# Patient Record
Sex: FEMALE | Race: OTHER | HISPANIC OR LATINO | URBAN - METROPOLITAN AREA
[De-identification: names, ages, dates, MRNs, and addresses within clinical notes are randomized per-mention and may not be internally consistent; named-entity substitution may affect disease eponyms.]

---

## 2020-01-01 ENCOUNTER — INPATIENT (INPATIENT)
Facility: HOSPITAL | Age: 45
LOS: 23 days | DRG: 207 | End: 2020-05-01
Attending: INTERNAL MEDICINE | Admitting: STUDENT IN AN ORGANIZED HEALTH CARE EDUCATION/TRAINING PROGRAM
Payer: COMMERCIAL

## 2020-01-01 VITALS
TEMPERATURE: 102 F | OXYGEN SATURATION: 84 % | SYSTOLIC BLOOD PRESSURE: 137 MMHG | HEART RATE: 121 BPM | WEIGHT: 187.39 LBS | RESPIRATION RATE: 24 BRPM | HEIGHT: 61.42 IN | DIASTOLIC BLOOD PRESSURE: 83 MMHG

## 2020-01-01 VITALS — HEART RATE: 116 BPM | OXYGEN SATURATION: 80 %

## 2020-01-01 DIAGNOSIS — R09.02 HYPOXEMIA: ICD-10-CM

## 2020-01-01 DIAGNOSIS — J18.9 PNEUMONIA, UNSPECIFIED ORGANISM: ICD-10-CM

## 2020-01-01 DIAGNOSIS — F32.9 MAJOR DEPRESSIVE DISORDER, SINGLE EPISODE, UNSPECIFIED: ICD-10-CM

## 2020-01-01 DIAGNOSIS — J96.01 ACUTE RESPIRATORY FAILURE WITH HYPOXIA: ICD-10-CM

## 2020-01-01 DIAGNOSIS — E03.9 HYPOTHYROIDISM, UNSPECIFIED: ICD-10-CM

## 2020-01-01 DIAGNOSIS — Z29.9 ENCOUNTER FOR PROPHYLACTIC MEASURES, UNSPECIFIED: ICD-10-CM

## 2020-01-01 LAB
-  AMIKACIN: SIGNIFICANT CHANGE UP
-  AMIKACIN: SIGNIFICANT CHANGE UP
-  AMPICILLIN/SULBACTAM: SIGNIFICANT CHANGE UP
-  AMPICILLIN/SULBACTAM: SIGNIFICANT CHANGE UP
-  AMPICILLIN: SIGNIFICANT CHANGE UP
-  AMPICILLIN: SIGNIFICANT CHANGE UP
-  AZTREONAM: SIGNIFICANT CHANGE UP
-  AZTREONAM: SIGNIFICANT CHANGE UP
-  CEFAZOLIN: SIGNIFICANT CHANGE UP
-  CEFAZOLIN: SIGNIFICANT CHANGE UP
-  CEFEPIME: SIGNIFICANT CHANGE UP
-  CEFEPIME: SIGNIFICANT CHANGE UP
-  CEFOXITIN: SIGNIFICANT CHANGE UP
-  CEFOXITIN: SIGNIFICANT CHANGE UP
-  CEFTRIAXONE: SIGNIFICANT CHANGE UP
-  CEFTRIAXONE: SIGNIFICANT CHANGE UP
-  CIPROFLOXACIN: SIGNIFICANT CHANGE UP
-  CIPROFLOXACIN: SIGNIFICANT CHANGE UP
-  ERTAPENEM: SIGNIFICANT CHANGE UP
-  ERTAPENEM: SIGNIFICANT CHANGE UP
-  GENTAMICIN: SIGNIFICANT CHANGE UP
-  GENTAMICIN: SIGNIFICANT CHANGE UP
-  IMIPENEM: SIGNIFICANT CHANGE UP
-  IMIPENEM: SIGNIFICANT CHANGE UP
-  LEVOFLOXACIN: SIGNIFICANT CHANGE UP
-  LEVOFLOXACIN: SIGNIFICANT CHANGE UP
-  MEROPENEM: SIGNIFICANT CHANGE UP
-  MEROPENEM: SIGNIFICANT CHANGE UP
-  PIPERACILLIN/TAZOBACTAM: SIGNIFICANT CHANGE UP
-  PIPERACILLIN/TAZOBACTAM: SIGNIFICANT CHANGE UP
-  TOBRAMYCIN: SIGNIFICANT CHANGE UP
-  TOBRAMYCIN: SIGNIFICANT CHANGE UP
-  TRIMETHOPRIM/SULFAMETHOXAZOLE: SIGNIFICANT CHANGE UP
-  TRIMETHOPRIM/SULFAMETHOXAZOLE: SIGNIFICANT CHANGE UP
ABO RH CONFIRMATION: SIGNIFICANT CHANGE UP
ALBUMIN SERPL ELPH-MCNC: 2.9 G/DL — LOW (ref 3.3–5.2)
ALBUMIN SERPL ELPH-MCNC: 3 G/DL — LOW (ref 3.3–5.2)
ALBUMIN SERPL ELPH-MCNC: 3.1 G/DL — LOW (ref 3.3–5.2)
ALBUMIN SERPL ELPH-MCNC: 3.2 G/DL — LOW (ref 3.3–5.2)
ALBUMIN SERPL ELPH-MCNC: 3.2 G/DL — LOW (ref 3.3–5.2)
ALBUMIN SERPL ELPH-MCNC: 3.5 G/DL — SIGNIFICANT CHANGE UP (ref 3.3–5.2)
ALBUMIN SERPL ELPH-MCNC: 3.5 G/DL — SIGNIFICANT CHANGE UP (ref 3.3–5.2)
ALBUMIN SERPL ELPH-MCNC: 3.6 G/DL — SIGNIFICANT CHANGE UP (ref 3.3–5.2)
ALBUMIN SERPL ELPH-MCNC: 4.1 G/DL — SIGNIFICANT CHANGE UP (ref 3.3–5.2)
ALP SERPL-CCNC: 105 U/L — SIGNIFICANT CHANGE UP (ref 40–120)
ALP SERPL-CCNC: 105 U/L — SIGNIFICANT CHANGE UP (ref 40–120)
ALP SERPL-CCNC: 112 U/L — SIGNIFICANT CHANGE UP (ref 40–120)
ALP SERPL-CCNC: 115 U/L — SIGNIFICANT CHANGE UP (ref 40–120)
ALP SERPL-CCNC: 120 U/L — SIGNIFICANT CHANGE UP (ref 40–120)
ALP SERPL-CCNC: 130 U/L — HIGH (ref 40–120)
ALP SERPL-CCNC: 140 U/L — HIGH (ref 40–120)
ALP SERPL-CCNC: 146 U/L — HIGH (ref 40–120)
ALP SERPL-CCNC: 146 U/L — HIGH (ref 40–120)
ALP SERPL-CCNC: 155 U/L — HIGH (ref 40–120)
ALP SERPL-CCNC: 182 U/L — HIGH (ref 40–120)
ALP SERPL-CCNC: 64 U/L — SIGNIFICANT CHANGE UP (ref 40–120)
ALP SERPL-CCNC: 94 U/L — SIGNIFICANT CHANGE UP (ref 40–120)
ALP SERPL-CCNC: 94 U/L — SIGNIFICANT CHANGE UP (ref 40–120)
ALP SERPL-CCNC: 98 U/L — SIGNIFICANT CHANGE UP (ref 40–120)
ALP SERPL-CCNC: 99 U/L — SIGNIFICANT CHANGE UP (ref 40–120)
ALP SERPL-CCNC: 99 U/L — SIGNIFICANT CHANGE UP (ref 40–120)
ALT FLD-CCNC: 119 U/L — HIGH
ALT FLD-CCNC: 164 U/L — HIGH
ALT FLD-CCNC: 169 U/L — HIGH
ALT FLD-CCNC: 174 U/L — HIGH
ALT FLD-CCNC: 199 U/L — HIGH
ALT FLD-CCNC: 226 U/L — HIGH
ALT FLD-CCNC: 31 U/L — SIGNIFICANT CHANGE UP
ALT FLD-CCNC: 336 U/L — HIGH
ALT FLD-CCNC: 34 U/L — HIGH
ALT FLD-CCNC: 37 U/L — HIGH
ALT FLD-CCNC: 39 U/L — HIGH
ALT FLD-CCNC: 54 U/L — HIGH
ALT FLD-CCNC: 62 U/L — HIGH
ALT FLD-CCNC: 63 U/L — HIGH
ALT FLD-CCNC: 73 U/L — HIGH
ALT FLD-CCNC: 77 U/L — HIGH
ALT FLD-CCNC: 85 U/L — HIGH
AMYLASE P1 CFR SERPL: 58 U/L — SIGNIFICANT CHANGE UP (ref 36–128)
ANION GAP SERPL CALC-SCNC: 10 MMOL/L — SIGNIFICANT CHANGE UP (ref 5–17)
ANION GAP SERPL CALC-SCNC: 10 MMOL/L — SIGNIFICANT CHANGE UP (ref 5–17)
ANION GAP SERPL CALC-SCNC: 11 MMOL/L — SIGNIFICANT CHANGE UP (ref 5–17)
ANION GAP SERPL CALC-SCNC: 12 MMOL/L — SIGNIFICANT CHANGE UP (ref 5–17)
ANION GAP SERPL CALC-SCNC: 13 MMOL/L — SIGNIFICANT CHANGE UP (ref 5–17)
ANION GAP SERPL CALC-SCNC: 14 MMOL/L — SIGNIFICANT CHANGE UP (ref 5–17)
ANION GAP SERPL CALC-SCNC: 15 MMOL/L — SIGNIFICANT CHANGE UP (ref 5–17)
ANION GAP SERPL CALC-SCNC: 15 MMOL/L — SIGNIFICANT CHANGE UP (ref 5–17)
ANION GAP SERPL CALC-SCNC: 16 MMOL/L — SIGNIFICANT CHANGE UP (ref 5–17)
ANION GAP SERPL CALC-SCNC: 16 MMOL/L — SIGNIFICANT CHANGE UP (ref 5–17)
ANION GAP SERPL CALC-SCNC: 17 MMOL/L — SIGNIFICANT CHANGE UP (ref 5–17)
ANION GAP SERPL CALC-SCNC: 7 MMOL/L — SIGNIFICANT CHANGE UP (ref 5–17)
ANION GAP SERPL CALC-SCNC: 7 MMOL/L — SIGNIFICANT CHANGE UP (ref 5–17)
ANION GAP SERPL CALC-SCNC: 9 MMOL/L — SIGNIFICANT CHANGE UP (ref 5–17)
ANISOCYTOSIS BLD QL: SLIGHT — SIGNIFICANT CHANGE UP
ANISOCYTOSIS BLD QL: SLIGHT — SIGNIFICANT CHANGE UP
APPEARANCE UR: CLEAR — SIGNIFICANT CHANGE UP
APTT BLD: 116.4 SEC — HIGH (ref 27.5–36.3)
APTT BLD: 137.3 SEC — CRITICAL HIGH (ref 27.5–36.3)
APTT BLD: 42.9 SEC — HIGH (ref 27.5–36.3)
APTT BLD: 62.4 SEC — HIGH (ref 27.5–36.3)
APTT BLD: 64.2 SEC — HIGH (ref 27.5–36.3)
APTT BLD: 74.8 SEC — HIGH (ref 27.5–36.3)
APTT BLD: 78.7 SEC — HIGH (ref 27.5–36.3)
APTT BLD: 88.9 SEC — HIGH (ref 27.5–36.3)
AST SERPL-CCNC: 118 U/L — HIGH
AST SERPL-CCNC: 158 U/L — HIGH
AST SERPL-CCNC: 216 U/L — HIGH
AST SERPL-CCNC: 32 U/L — HIGH
AST SERPL-CCNC: 34 U/L — HIGH
AST SERPL-CCNC: 34 U/L — HIGH
AST SERPL-CCNC: 36 U/L — HIGH
AST SERPL-CCNC: 42 U/L — HIGH
AST SERPL-CCNC: 44 U/L — HIGH
AST SERPL-CCNC: 53 U/L — HIGH
AST SERPL-CCNC: 61 U/L — HIGH
AST SERPL-CCNC: 66 U/L — HIGH
AST SERPL-CCNC: 70 U/L — HIGH
AST SERPL-CCNC: 71 U/L — HIGH
AST SERPL-CCNC: 83 U/L — HIGH
AST SERPL-CCNC: 89 U/L — HIGH
AST SERPL-CCNC: 96 U/L — HIGH
BACTERIA # UR AUTO: ABNORMAL
BASE EXCESS BLDA CALC-SCNC: 10.4 MMOL/L — HIGH (ref -2–2)
BASE EXCESS BLDA CALC-SCNC: 11.3 MMOL/L — HIGH (ref -2–2)
BASE EXCESS BLDA CALC-SCNC: 11.7 MMOL/L — HIGH (ref -2–2)
BASE EXCESS BLDA CALC-SCNC: 14 MMOL/L — HIGH (ref -2–2)
BASE EXCESS BLDA CALC-SCNC: 2 MMOL/L — SIGNIFICANT CHANGE UP (ref -3–3)
BASE EXCESS BLDA CALC-SCNC: 4.4 MMOL/L — HIGH (ref -2–2)
BASE EXCESS BLDA CALC-SCNC: 6.6 MMOL/L — HIGH (ref -2–2)
BASE EXCESS BLDA CALC-SCNC: 7.5 MMOL/L — HIGH (ref -2–2)
BASE EXCESS BLDA CALC-SCNC: 9.1 MMOL/L — HIGH (ref -2–2)
BASO STIPL BLD QL SMEAR: PRESENT — SIGNIFICANT CHANGE UP
BASOPHILS # BLD AUTO: 0 K/UL — SIGNIFICANT CHANGE UP (ref 0–0.2)
BASOPHILS # BLD AUTO: 0 K/UL — SIGNIFICANT CHANGE UP (ref 0–0.2)
BASOPHILS # BLD AUTO: 0.01 K/UL — SIGNIFICANT CHANGE UP (ref 0–0.2)
BASOPHILS # BLD AUTO: 0.02 K/UL — SIGNIFICANT CHANGE UP (ref 0–0.2)
BASOPHILS # BLD AUTO: 0.05 K/UL — SIGNIFICANT CHANGE UP (ref 0–0.2)
BASOPHILS # BLD AUTO: 0.05 K/UL — SIGNIFICANT CHANGE UP (ref 0–0.2)
BASOPHILS # BLD AUTO: 0.06 K/UL — SIGNIFICANT CHANGE UP (ref 0–0.2)
BASOPHILS # BLD AUTO: 0.07 K/UL — SIGNIFICANT CHANGE UP (ref 0–0.2)
BASOPHILS # BLD AUTO: 0.08 K/UL — SIGNIFICANT CHANGE UP (ref 0–0.2)
BASOPHILS # BLD AUTO: 0.09 K/UL — SIGNIFICANT CHANGE UP (ref 0–0.2)
BASOPHILS # BLD AUTO: 0.09 K/UL — SIGNIFICANT CHANGE UP (ref 0–0.2)
BASOPHILS # BLD AUTO: 0.13 K/UL — SIGNIFICANT CHANGE UP (ref 0–0.2)
BASOPHILS # BLD AUTO: 0.14 K/UL — SIGNIFICANT CHANGE UP (ref 0–0.2)
BASOPHILS NFR BLD AUTO: 0 % — SIGNIFICANT CHANGE UP (ref 0–2)
BASOPHILS NFR BLD AUTO: 0 % — SIGNIFICANT CHANGE UP (ref 0–2)
BASOPHILS NFR BLD AUTO: 0.2 % — SIGNIFICANT CHANGE UP (ref 0–2)
BASOPHILS NFR BLD AUTO: 0.3 % — SIGNIFICANT CHANGE UP (ref 0–2)
BASOPHILS NFR BLD AUTO: 0.4 % — SIGNIFICANT CHANGE UP (ref 0–2)
BASOPHILS NFR BLD AUTO: 0.6 % — SIGNIFICANT CHANGE UP (ref 0–2)
BASOPHILS NFR BLD AUTO: 0.6 % — SIGNIFICANT CHANGE UP (ref 0–2)
BASOPHILS NFR BLD AUTO: 0.7 % — SIGNIFICANT CHANGE UP (ref 0–2)
BILIRUB SERPL-MCNC: 0.3 MG/DL — LOW (ref 0.4–2)
BILIRUB SERPL-MCNC: 0.4 MG/DL — SIGNIFICANT CHANGE UP (ref 0.4–2)
BILIRUB SERPL-MCNC: 0.5 MG/DL — SIGNIFICANT CHANGE UP (ref 0.4–2)
BILIRUB SERPL-MCNC: 0.6 MG/DL — SIGNIFICANT CHANGE UP (ref 0.4–2)
BILIRUB SERPL-MCNC: 0.7 MG/DL — SIGNIFICANT CHANGE UP (ref 0.4–2)
BILIRUB SERPL-MCNC: 1 MG/DL — SIGNIFICANT CHANGE UP (ref 0.4–2)
BILIRUB SERPL-MCNC: 1 MG/DL — SIGNIFICANT CHANGE UP (ref 0.4–2)
BILIRUB SERPL-MCNC: 1.9 MG/DL — SIGNIFICANT CHANGE UP (ref 0.4–2)
BILIRUB SERPL-MCNC: 2.8 MG/DL — HIGH (ref 0.4–2)
BILIRUB UR-MCNC: ABNORMAL
BLD GP AB SCN SERPL QL: SIGNIFICANT CHANGE UP
BLOOD GAS COMMENTS ARTERIAL: SIGNIFICANT CHANGE UP
BUN SERPL-MCNC: 13 MG/DL — SIGNIFICANT CHANGE UP (ref 8–20)
BUN SERPL-MCNC: 15 MG/DL — SIGNIFICANT CHANGE UP (ref 8–20)
BUN SERPL-MCNC: 19 MG/DL — SIGNIFICANT CHANGE UP (ref 8–20)
BUN SERPL-MCNC: 20 MG/DL — SIGNIFICANT CHANGE UP (ref 8–20)
BUN SERPL-MCNC: 22 MG/DL — HIGH (ref 8–20)
BUN SERPL-MCNC: 22 MG/DL — HIGH (ref 8–20)
BUN SERPL-MCNC: 23 MG/DL — HIGH (ref 8–20)
BUN SERPL-MCNC: 24 MG/DL — HIGH (ref 8–20)
BUN SERPL-MCNC: 24 MG/DL — HIGH (ref 8–20)
BUN SERPL-MCNC: 25 MG/DL — HIGH (ref 8–20)
BUN SERPL-MCNC: 26 MG/DL — HIGH (ref 8–20)
BUN SERPL-MCNC: 27 MG/DL — HIGH (ref 8–20)
BUN SERPL-MCNC: 27 MG/DL — HIGH (ref 8–20)
BUN SERPL-MCNC: 28 MG/DL — HIGH (ref 8–20)
BUN SERPL-MCNC: 6 MG/DL — LOW (ref 8–20)
BUN SERPL-MCNC: 8 MG/DL — SIGNIFICANT CHANGE UP (ref 8–20)
CALCIUM SERPL-MCNC: 8 MG/DL — LOW (ref 8.6–10.2)
CALCIUM SERPL-MCNC: 8 MG/DL — LOW (ref 8.6–10.2)
CALCIUM SERPL-MCNC: 8.1 MG/DL — LOW (ref 8.6–10.2)
CALCIUM SERPL-MCNC: 8.2 MG/DL — LOW (ref 8.6–10.2)
CALCIUM SERPL-MCNC: 8.3 MG/DL — LOW (ref 8.6–10.2)
CALCIUM SERPL-MCNC: 8.4 MG/DL — LOW (ref 8.6–10.2)
CALCIUM SERPL-MCNC: 8.7 MG/DL — SIGNIFICANT CHANGE UP (ref 8.6–10.2)
CALCIUM SERPL-MCNC: 8.8 MG/DL — SIGNIFICANT CHANGE UP (ref 8.6–10.2)
CALCIUM SERPL-MCNC: 9 MG/DL — SIGNIFICANT CHANGE UP (ref 8.6–10.2)
CALCIUM SERPL-MCNC: 9.1 MG/DL — SIGNIFICANT CHANGE UP (ref 8.6–10.2)
CHLORIDE SERPL-SCNC: 100 MMOL/L — SIGNIFICANT CHANGE UP (ref 98–107)
CHLORIDE SERPL-SCNC: 101 MMOL/L — SIGNIFICANT CHANGE UP (ref 98–107)
CHLORIDE SERPL-SCNC: 101 MMOL/L — SIGNIFICANT CHANGE UP (ref 98–107)
CHLORIDE SERPL-SCNC: 103 MMOL/L — SIGNIFICANT CHANGE UP (ref 98–107)
CHLORIDE SERPL-SCNC: 94 MMOL/L — LOW (ref 98–107)
CHLORIDE SERPL-SCNC: 94 MMOL/L — LOW (ref 98–107)
CHLORIDE SERPL-SCNC: 95 MMOL/L — LOW (ref 98–107)
CHLORIDE SERPL-SCNC: 95 MMOL/L — LOW (ref 98–107)
CHLORIDE SERPL-SCNC: 97 MMOL/L — LOW (ref 98–107)
CHLORIDE SERPL-SCNC: 98 MMOL/L — SIGNIFICANT CHANGE UP (ref 98–107)
CHLORIDE SERPL-SCNC: 98 MMOL/L — SIGNIFICANT CHANGE UP (ref 98–107)
CHLORIDE SERPL-SCNC: 99 MMOL/L — SIGNIFICANT CHANGE UP (ref 98–107)
CO2 SERPL-SCNC: 25 MMOL/L — SIGNIFICANT CHANGE UP (ref 22–29)
CO2 SERPL-SCNC: 27 MMOL/L — SIGNIFICANT CHANGE UP (ref 22–29)
CO2 SERPL-SCNC: 27 MMOL/L — SIGNIFICANT CHANGE UP (ref 22–29)
CO2 SERPL-SCNC: 28 MMOL/L — SIGNIFICANT CHANGE UP (ref 22–29)
CO2 SERPL-SCNC: 28 MMOL/L — SIGNIFICANT CHANGE UP (ref 22–29)
CO2 SERPL-SCNC: 29 MMOL/L — SIGNIFICANT CHANGE UP (ref 22–29)
CO2 SERPL-SCNC: 30 MMOL/L — HIGH (ref 22–29)
CO2 SERPL-SCNC: 31 MMOL/L — HIGH (ref 22–29)
CO2 SERPL-SCNC: 32 MMOL/L — HIGH (ref 22–29)
CO2 SERPL-SCNC: 33 MMOL/L — HIGH (ref 22–29)
CO2 SERPL-SCNC: 34 MMOL/L — HIGH (ref 22–29)
CO2 SERPL-SCNC: 34 MMOL/L — HIGH (ref 22–29)
CO2 SERPL-SCNC: 35 MMOL/L — HIGH (ref 22–29)
CO2 SERPL-SCNC: 36 MMOL/L — HIGH (ref 22–29)
CO2 SERPL-SCNC: 37 MMOL/L — HIGH (ref 22–29)
CO2 SERPL-SCNC: 37 MMOL/L — HIGH (ref 22–29)
CO2 SERPL-SCNC: 40 MMOL/L — HIGH (ref 22–29)
COLOR SPEC: YELLOW — SIGNIFICANT CHANGE UP
CREAT SERPL-MCNC: 0.43 MG/DL — LOW (ref 0.5–1.3)
CREAT SERPL-MCNC: 0.46 MG/DL — LOW (ref 0.5–1.3)
CREAT SERPL-MCNC: 0.47 MG/DL — LOW (ref 0.5–1.3)
CREAT SERPL-MCNC: 0.47 MG/DL — LOW (ref 0.5–1.3)
CREAT SERPL-MCNC: 0.5 MG/DL — SIGNIFICANT CHANGE UP (ref 0.5–1.3)
CREAT SERPL-MCNC: 0.5 MG/DL — SIGNIFICANT CHANGE UP (ref 0.5–1.3)
CREAT SERPL-MCNC: 0.52 MG/DL — SIGNIFICANT CHANGE UP (ref 0.5–1.3)
CREAT SERPL-MCNC: 0.53 MG/DL — SIGNIFICANT CHANGE UP (ref 0.5–1.3)
CREAT SERPL-MCNC: 0.54 MG/DL — SIGNIFICANT CHANGE UP (ref 0.5–1.3)
CREAT SERPL-MCNC: 0.54 MG/DL — SIGNIFICANT CHANGE UP (ref 0.5–1.3)
CREAT SERPL-MCNC: 0.61 MG/DL — SIGNIFICANT CHANGE UP (ref 0.5–1.3)
CREAT SERPL-MCNC: 0.65 MG/DL — SIGNIFICANT CHANGE UP (ref 0.5–1.3)
CREAT SERPL-MCNC: 0.66 MG/DL — SIGNIFICANT CHANGE UP (ref 0.5–1.3)
CREAT SERPL-MCNC: 0.74 MG/DL — SIGNIFICANT CHANGE UP (ref 0.5–1.3)
CREAT SERPL-MCNC: 0.74 MG/DL — SIGNIFICANT CHANGE UP (ref 0.5–1.3)
CREAT SERPL-MCNC: 0.78 MG/DL — SIGNIFICANT CHANGE UP (ref 0.5–1.3)
CREAT SERPL-MCNC: 0.8 MG/DL — SIGNIFICANT CHANGE UP (ref 0.5–1.3)
CREAT SERPL-MCNC: 0.81 MG/DL — SIGNIFICANT CHANGE UP (ref 0.5–1.3)
CREAT SERPL-MCNC: 0.85 MG/DL — SIGNIFICANT CHANGE UP (ref 0.5–1.3)
CREAT SERPL-MCNC: 0.85 MG/DL — SIGNIFICANT CHANGE UP (ref 0.5–1.3)
CREAT SERPL-MCNC: 0.88 MG/DL — SIGNIFICANT CHANGE UP (ref 0.5–1.3)
CRP SERPL-MCNC: 1.11 MG/DL — HIGH (ref 0–0.4)
CRP SERPL-MCNC: 1.71 MG/DL — HIGH (ref 0–0.4)
CRP SERPL-MCNC: 11.97 MG/DL — HIGH (ref 0–0.4)
CRP SERPL-MCNC: 2.75 MG/DL — HIGH (ref 0–0.4)
CRP SERPL-MCNC: 3.19 MG/DL — HIGH (ref 0–0.4)
CRP SERPL-MCNC: 4.89 MG/DL — HIGH (ref 0–0.4)
CRP SERPL-MCNC: 7.46 MG/DL — HIGH (ref 0–0.4)
CRP SERPL-MCNC: 8.19 MG/DL — HIGH (ref 0–0.4)
CULTURE RESULTS: NO GROWTH — SIGNIFICANT CHANGE UP
CULTURE RESULTS: SIGNIFICANT CHANGE UP
D DIMER BLD IA.RAPID-MCNC: 1006 NG/ML DDU — HIGH
D DIMER BLD IA.RAPID-MCNC: 1069 NG/ML DDU — HIGH
D DIMER BLD IA.RAPID-MCNC: 2022 NG/ML DDU — HIGH
D DIMER BLD IA.RAPID-MCNC: 378 NG/ML DDU — HIGH
D DIMER BLD IA.RAPID-MCNC: 3880 NG/ML DDU — HIGH
D DIMER BLD IA.RAPID-MCNC: 3934 NG/ML DDU — HIGH
D DIMER BLD IA.RAPID-MCNC: 915 NG/ML DDU — HIGH
DIFF PNL FLD: ABNORMAL
E COLI DNA BLD POS QL NAA+NON-PROBE: SIGNIFICANT CHANGE UP
EOSINOPHIL # BLD AUTO: 0 K/UL — SIGNIFICANT CHANGE UP (ref 0–0.5)
EOSINOPHIL # BLD AUTO: 0.03 K/UL — SIGNIFICANT CHANGE UP (ref 0–0.5)
EOSINOPHIL # BLD AUTO: 0.03 K/UL — SIGNIFICANT CHANGE UP (ref 0–0.5)
EOSINOPHIL # BLD AUTO: 0.12 K/UL — SIGNIFICANT CHANGE UP (ref 0–0.5)
EOSINOPHIL # BLD AUTO: 0.13 K/UL — SIGNIFICANT CHANGE UP (ref 0–0.5)
EOSINOPHIL # BLD AUTO: 0.14 K/UL — SIGNIFICANT CHANGE UP (ref 0–0.5)
EOSINOPHIL # BLD AUTO: 0.22 K/UL — SIGNIFICANT CHANGE UP (ref 0–0.5)
EOSINOPHIL # BLD AUTO: 0.25 K/UL — SIGNIFICANT CHANGE UP (ref 0–0.5)
EOSINOPHIL # BLD AUTO: 0.43 K/UL — SIGNIFICANT CHANGE UP (ref 0–0.5)
EOSINOPHIL # BLD AUTO: 0.48 K/UL — SIGNIFICANT CHANGE UP (ref 0–0.5)
EOSINOPHIL # BLD AUTO: 1.01 K/UL — HIGH (ref 0–0.5)
EOSINOPHIL NFR BLD AUTO: 0 % — SIGNIFICANT CHANGE UP (ref 0–6)
EOSINOPHIL NFR BLD AUTO: 0.1 % — SIGNIFICANT CHANGE UP (ref 0–6)
EOSINOPHIL NFR BLD AUTO: 0.2 % — SIGNIFICANT CHANGE UP (ref 0–6)
EOSINOPHIL NFR BLD AUTO: 0.4 % — SIGNIFICANT CHANGE UP (ref 0–6)
EOSINOPHIL NFR BLD AUTO: 0.6 % — SIGNIFICANT CHANGE UP (ref 0–6)
EOSINOPHIL NFR BLD AUTO: 0.7 % — SIGNIFICANT CHANGE UP (ref 0–6)
EOSINOPHIL NFR BLD AUTO: 1.1 % — SIGNIFICANT CHANGE UP (ref 0–6)
EOSINOPHIL NFR BLD AUTO: 1.3 % — SIGNIFICANT CHANGE UP (ref 0–6)
EOSINOPHIL NFR BLD AUTO: 3 % — SIGNIFICANT CHANGE UP (ref 0–6)
EOSINOPHIL NFR BLD AUTO: 3.6 % — SIGNIFICANT CHANGE UP (ref 0–6)
EOSINOPHIL NFR BLD AUTO: 6.5 % — HIGH (ref 0–6)
EPI CELLS # UR: SIGNIFICANT CHANGE UP
FERRITIN SERPL-MCNC: 283 NG/ML — HIGH (ref 15–150)
FERRITIN SERPL-MCNC: 309 NG/ML — HIGH (ref 15–150)
FERRITIN SERPL-MCNC: 310 NG/ML — HIGH (ref 15–150)
FERRITIN SERPL-MCNC: 318 NG/ML — HIGH (ref 15–150)
FERRITIN SERPL-MCNC: 324 NG/ML — HIGH (ref 15–150)
FERRITIN SERPL-MCNC: 340 NG/ML — HIGH (ref 15–150)
FERRITIN SERPL-MCNC: 400 NG/ML — HIGH (ref 15–150)
GAS PNL BLDA: SIGNIFICANT CHANGE UP
GIANT PLATELETS BLD QL SMEAR: PRESENT — SIGNIFICANT CHANGE UP
GIANT PLATELETS BLD QL SMEAR: PRESENT — SIGNIFICANT CHANGE UP
GLUCOSE BLDC GLUCOMTR-MCNC: 106 MG/DL — HIGH (ref 70–99)
GLUCOSE BLDC GLUCOMTR-MCNC: 109 MG/DL — HIGH (ref 70–99)
GLUCOSE BLDC GLUCOMTR-MCNC: 109 MG/DL — HIGH (ref 70–99)
GLUCOSE BLDC GLUCOMTR-MCNC: 111 MG/DL — HIGH (ref 70–99)
GLUCOSE BLDC GLUCOMTR-MCNC: 112 MG/DL — HIGH (ref 70–99)
GLUCOSE BLDC GLUCOMTR-MCNC: 112 MG/DL — HIGH (ref 70–99)
GLUCOSE BLDC GLUCOMTR-MCNC: 113 MG/DL — HIGH (ref 70–99)
GLUCOSE BLDC GLUCOMTR-MCNC: 113 MG/DL — HIGH (ref 70–99)
GLUCOSE BLDC GLUCOMTR-MCNC: 114 MG/DL — HIGH (ref 70–99)
GLUCOSE BLDC GLUCOMTR-MCNC: 114 MG/DL — HIGH (ref 70–99)
GLUCOSE BLDC GLUCOMTR-MCNC: 116 MG/DL — HIGH (ref 70–99)
GLUCOSE BLDC GLUCOMTR-MCNC: 117 MG/DL — HIGH (ref 70–99)
GLUCOSE BLDC GLUCOMTR-MCNC: 118 MG/DL — HIGH (ref 70–99)
GLUCOSE BLDC GLUCOMTR-MCNC: 119 MG/DL — HIGH (ref 70–99)
GLUCOSE BLDC GLUCOMTR-MCNC: 119 MG/DL — HIGH (ref 70–99)
GLUCOSE BLDC GLUCOMTR-MCNC: 120 MG/DL — HIGH (ref 70–99)
GLUCOSE BLDC GLUCOMTR-MCNC: 120 MG/DL — HIGH (ref 70–99)
GLUCOSE BLDC GLUCOMTR-MCNC: 121 MG/DL — HIGH (ref 70–99)
GLUCOSE BLDC GLUCOMTR-MCNC: 122 MG/DL — HIGH (ref 70–99)
GLUCOSE BLDC GLUCOMTR-MCNC: 123 MG/DL — HIGH (ref 70–99)
GLUCOSE BLDC GLUCOMTR-MCNC: 123 MG/DL — HIGH (ref 70–99)
GLUCOSE BLDC GLUCOMTR-MCNC: 124 MG/DL — HIGH (ref 70–99)
GLUCOSE BLDC GLUCOMTR-MCNC: 125 MG/DL — HIGH (ref 70–99)
GLUCOSE BLDC GLUCOMTR-MCNC: 125 MG/DL — HIGH (ref 70–99)
GLUCOSE BLDC GLUCOMTR-MCNC: 128 MG/DL — HIGH (ref 70–99)
GLUCOSE BLDC GLUCOMTR-MCNC: 129 MG/DL — HIGH (ref 70–99)
GLUCOSE BLDC GLUCOMTR-MCNC: 129 MG/DL — HIGH (ref 70–99)
GLUCOSE BLDC GLUCOMTR-MCNC: 130 MG/DL — HIGH (ref 70–99)
GLUCOSE BLDC GLUCOMTR-MCNC: 130 MG/DL — HIGH (ref 70–99)
GLUCOSE BLDC GLUCOMTR-MCNC: 133 MG/DL — HIGH (ref 70–99)
GLUCOSE BLDC GLUCOMTR-MCNC: 135 MG/DL — HIGH (ref 70–99)
GLUCOSE BLDC GLUCOMTR-MCNC: 135 MG/DL — HIGH (ref 70–99)
GLUCOSE BLDC GLUCOMTR-MCNC: 136 MG/DL — HIGH (ref 70–99)
GLUCOSE BLDC GLUCOMTR-MCNC: 138 MG/DL — HIGH (ref 70–99)
GLUCOSE BLDC GLUCOMTR-MCNC: 139 MG/DL — HIGH (ref 70–99)
GLUCOSE BLDC GLUCOMTR-MCNC: 141 MG/DL — HIGH (ref 70–99)
GLUCOSE BLDC GLUCOMTR-MCNC: 145 MG/DL — HIGH (ref 70–99)
GLUCOSE BLDC GLUCOMTR-MCNC: 145 MG/DL — HIGH (ref 70–99)
GLUCOSE BLDC GLUCOMTR-MCNC: 148 MG/DL — HIGH (ref 70–99)
GLUCOSE BLDC GLUCOMTR-MCNC: 155 MG/DL — HIGH (ref 70–99)
GLUCOSE BLDC GLUCOMTR-MCNC: 156 MG/DL — HIGH (ref 70–99)
GLUCOSE BLDC GLUCOMTR-MCNC: 156 MG/DL — HIGH (ref 70–99)
GLUCOSE BLDC GLUCOMTR-MCNC: 160 MG/DL — HIGH (ref 70–99)
GLUCOSE BLDC GLUCOMTR-MCNC: 172 MG/DL — HIGH (ref 70–99)
GLUCOSE BLDC GLUCOMTR-MCNC: 193 MG/DL — HIGH (ref 70–99)
GLUCOSE BLDC GLUCOMTR-MCNC: 87 MG/DL — SIGNIFICANT CHANGE UP (ref 70–99)
GLUCOSE BLDC GLUCOMTR-MCNC: 88 MG/DL — SIGNIFICANT CHANGE UP (ref 70–99)
GLUCOSE BLDC GLUCOMTR-MCNC: 90 MG/DL — SIGNIFICANT CHANGE UP (ref 70–99)
GLUCOSE BLDC GLUCOMTR-MCNC: 95 MG/DL — SIGNIFICANT CHANGE UP (ref 70–99)
GLUCOSE BLDC GLUCOMTR-MCNC: 98 MG/DL — SIGNIFICANT CHANGE UP (ref 70–99)
GLUCOSE SERPL-MCNC: 101 MG/DL — HIGH (ref 70–99)
GLUCOSE SERPL-MCNC: 106 MG/DL — HIGH (ref 70–99)
GLUCOSE SERPL-MCNC: 108 MG/DL — HIGH (ref 70–99)
GLUCOSE SERPL-MCNC: 112 MG/DL — HIGH (ref 70–99)
GLUCOSE SERPL-MCNC: 117 MG/DL — HIGH (ref 70–99)
GLUCOSE SERPL-MCNC: 119 MG/DL — HIGH (ref 70–99)
GLUCOSE SERPL-MCNC: 119 MG/DL — HIGH (ref 70–99)
GLUCOSE SERPL-MCNC: 125 MG/DL — HIGH (ref 70–99)
GLUCOSE SERPL-MCNC: 127 MG/DL — HIGH (ref 70–99)
GLUCOSE SERPL-MCNC: 131 MG/DL — HIGH (ref 70–99)
GLUCOSE SERPL-MCNC: 138 MG/DL — HIGH (ref 70–99)
GLUCOSE SERPL-MCNC: 141 MG/DL — HIGH (ref 70–99)
GLUCOSE SERPL-MCNC: 154 MG/DL — HIGH (ref 70–99)
GLUCOSE SERPL-MCNC: 80 MG/DL — SIGNIFICANT CHANGE UP (ref 70–99)
GLUCOSE SERPL-MCNC: 81 MG/DL — SIGNIFICANT CHANGE UP (ref 70–99)
GLUCOSE SERPL-MCNC: 84 MG/DL — SIGNIFICANT CHANGE UP (ref 70–99)
GLUCOSE SERPL-MCNC: 95 MG/DL — SIGNIFICANT CHANGE UP (ref 70–99)
GLUCOSE SERPL-MCNC: 97 MG/DL — SIGNIFICANT CHANGE UP (ref 70–99)
GLUCOSE SERPL-MCNC: 98 MG/DL — SIGNIFICANT CHANGE UP (ref 70–99)
GLUCOSE UR QL: NEGATIVE — SIGNIFICANT CHANGE UP
GRAM STN FLD: SIGNIFICANT CHANGE UP
GRAN CASTS # UR COMP ASSIST: ABNORMAL /LPF
HCG SERPL-ACNC: <4 MIU/ML — SIGNIFICANT CHANGE UP
HCO3 BLDA-SCNC: 26 MMOL/L — SIGNIFICANT CHANGE UP (ref 20–26)
HCO3 BLDA-SCNC: 28 MMOL/L — HIGH (ref 20–26)
HCO3 BLDA-SCNC: 28 MMOL/L — SIGNIFICANT CHANGE UP (ref 21–29)
HCO3 BLDA-SCNC: 30 MMOL/L — HIGH (ref 20–26)
HCO3 BLDA-SCNC: 31 MMOL/L — HIGH (ref 21–29)
HCO3 BLDA-SCNC: 33 MMOL/L — HIGH (ref 20–26)
HCO3 BLDA-SCNC: 33 MMOL/L — HIGH (ref 20–26)
HCO3 BLDA-SCNC: 34 MMOL/L — HIGH (ref 21–29)
HCO3 BLDA-SCNC: 35 MMOL/L — HIGH (ref 20–26)
HCO3 BLDA-SCNC: 36 MMOL/L — HIGH (ref 21–29)
HCO3 BLDA-SCNC: 37 MMOL/L — HIGH (ref 21–29)
HCO3 BLDA-SCNC: 38 MMOL/L — HIGH (ref 20–26)
HCT VFR BLD CALC: 26.2 % — LOW (ref 34.5–45)
HCT VFR BLD CALC: 27.8 % — LOW (ref 34.5–45)
HCT VFR BLD CALC: 27.9 % — LOW (ref 34.5–45)
HCT VFR BLD CALC: 28.6 % — LOW (ref 34.5–45)
HCT VFR BLD CALC: 28.7 % — LOW (ref 34.5–45)
HCT VFR BLD CALC: 28.8 % — LOW (ref 34.5–45)
HCT VFR BLD CALC: 29.5 % — LOW (ref 34.5–45)
HCT VFR BLD CALC: 30.4 % — LOW (ref 34.5–45)
HCT VFR BLD CALC: 30.7 % — LOW (ref 34.5–45)
HCT VFR BLD CALC: 31.4 % — LOW (ref 34.5–45)
HCT VFR BLD CALC: 36.7 % — SIGNIFICANT CHANGE UP (ref 34.5–45)
HCT VFR BLD CALC: 36.8 % — SIGNIFICANT CHANGE UP (ref 34.5–45)
HCT VFR BLD CALC: 37.5 % — SIGNIFICANT CHANGE UP (ref 34.5–45)
HCT VFR BLD CALC: 38.5 % — SIGNIFICANT CHANGE UP (ref 34.5–45)
HCT VFR BLD CALC: 39.7 % — SIGNIFICANT CHANGE UP (ref 34.5–45)
HCT VFR BLD CALC: 40.1 % — SIGNIFICANT CHANGE UP (ref 34.5–45)
HCT VFR BLD CALC: 40.3 % — SIGNIFICANT CHANGE UP (ref 34.5–45)
HCT VFR BLD CALC: 41.1 % — SIGNIFICANT CHANGE UP (ref 34.5–45)
HCT VFR BLD CALC: 42.1 % — SIGNIFICANT CHANGE UP (ref 34.5–45)
HCT VFR BLD CALC: 43.9 % — SIGNIFICANT CHANGE UP (ref 34.5–45)
HCT VFR BLD CALC: 44.9 % — SIGNIFICANT CHANGE UP (ref 34.5–45)
HEPARIN-PF4 AB RESULT: 1.1 U/ML — HIGH (ref 0–0.9)
HGB BLD-MCNC: 11.1 G/DL — LOW (ref 11.5–15.5)
HGB BLD-MCNC: 11.9 G/DL — SIGNIFICANT CHANGE UP (ref 11.5–15.5)
HGB BLD-MCNC: 12 G/DL — SIGNIFICANT CHANGE UP (ref 11.5–15.5)
HGB BLD-MCNC: 12.3 G/DL — SIGNIFICANT CHANGE UP (ref 11.5–15.5)
HGB BLD-MCNC: 12.5 G/DL — SIGNIFICANT CHANGE UP (ref 11.5–15.5)
HGB BLD-MCNC: 13.1 G/DL — SIGNIFICANT CHANGE UP (ref 11.5–15.5)
HGB BLD-MCNC: 13.2 G/DL — SIGNIFICANT CHANGE UP (ref 11.5–15.5)
HGB BLD-MCNC: 13.3 G/DL — SIGNIFICANT CHANGE UP (ref 11.5–15.5)
HGB BLD-MCNC: 13.6 G/DL — SIGNIFICANT CHANGE UP (ref 11.5–15.5)
HGB BLD-MCNC: 13.9 G/DL — SIGNIFICANT CHANGE UP (ref 11.5–15.5)
HGB BLD-MCNC: 14.1 G/DL — SIGNIFICANT CHANGE UP (ref 11.5–15.5)
HGB BLD-MCNC: 7.6 G/DL — LOW (ref 11.5–15.5)
HGB BLD-MCNC: 7.9 G/DL — LOW (ref 11.5–15.5)
HGB BLD-MCNC: 8.2 G/DL — LOW (ref 11.5–15.5)
HGB BLD-MCNC: 8.4 G/DL — LOW (ref 11.5–15.5)
HGB BLD-MCNC: 8.6 G/DL — LOW (ref 11.5–15.5)
HGB BLD-MCNC: 8.8 G/DL — LOW (ref 11.5–15.5)
HGB BLD-MCNC: 8.9 G/DL — LOW (ref 11.5–15.5)
HGB BLD-MCNC: 9.2 G/DL — LOW (ref 11.5–15.5)
HGB BLD-MCNC: 9.3 G/DL — LOW (ref 11.5–15.5)
HGB BLD-MCNC: 9.7 G/DL — LOW (ref 11.5–15.5)
HOROWITZ INDEX BLDA+IHG-RTO: 100 — SIGNIFICANT CHANGE UP
HOROWITZ INDEX BLDA+IHG-RTO: SIGNIFICANT CHANGE UP
HYPOCHROMIA BLD QL: SLIGHT — SIGNIFICANT CHANGE UP
HYPOCHROMIA BLD QL: SLIGHT — SIGNIFICANT CHANGE UP
IMM GRANULOCYTES NFR BLD AUTO: 0.7 % — SIGNIFICANT CHANGE UP (ref 0–1.5)
IMM GRANULOCYTES NFR BLD AUTO: 0.9 % — SIGNIFICANT CHANGE UP (ref 0–1.5)
IMM GRANULOCYTES NFR BLD AUTO: 2.3 % — HIGH (ref 0–1.5)
IMM GRANULOCYTES NFR BLD AUTO: 2.4 % — HIGH (ref 0–1.5)
IMM GRANULOCYTES NFR BLD AUTO: 3.1 % — HIGH (ref 0–1.5)
IMM GRANULOCYTES NFR BLD AUTO: 3.3 % — HIGH (ref 0–1.5)
IMM GRANULOCYTES NFR BLD AUTO: 3.5 % — HIGH (ref 0–1.5)
IMM GRANULOCYTES NFR BLD AUTO: 4.5 % — HIGH (ref 0–1.5)
IMM GRANULOCYTES NFR BLD AUTO: 5.1 % — HIGH (ref 0–1.5)
IMM GRANULOCYTES NFR BLD AUTO: 5.9 % — HIGH (ref 0–1.5)
IMM GRANULOCYTES NFR BLD AUTO: 6 % — HIGH (ref 0–1.5)
IMM GRANULOCYTES NFR BLD AUTO: 7.3 % — HIGH (ref 0–1.5)
IMM GRANULOCYTES NFR BLD AUTO: 8.7 % — HIGH (ref 0–1.5)
INR BLD: 1.33 RATIO — HIGH (ref 0.88–1.16)
KETONES UR-MCNC: ABNORMAL
LDH SERPL L TO P-CCNC: 532 U/L — HIGH (ref 98–192)
LDH SERPL L TO P-CCNC: 606 U/L — HIGH (ref 98–192)
LDH SERPL L TO P-CCNC: 625 U/L — HIGH (ref 98–192)
LEUKOCYTE ESTERASE UR-ACNC: ABNORMAL
LYMPHOCYTES # BLD AUTO: 0.38 K/UL — LOW (ref 1–3.3)
LYMPHOCYTES # BLD AUTO: 0.45 K/UL — LOW (ref 1–3.3)
LYMPHOCYTES # BLD AUTO: 0.57 K/UL — LOW (ref 1–3.3)
LYMPHOCYTES # BLD AUTO: 0.71 K/UL — LOW (ref 1–3.3)
LYMPHOCYTES # BLD AUTO: 0.78 K/UL — LOW (ref 1–3.3)
LYMPHOCYTES # BLD AUTO: 0.96 K/UL — LOW (ref 1–3.3)
LYMPHOCYTES # BLD AUTO: 1.04 K/UL — SIGNIFICANT CHANGE UP (ref 1–3.3)
LYMPHOCYTES # BLD AUTO: 1.06 K/UL — SIGNIFICANT CHANGE UP (ref 1–3.3)
LYMPHOCYTES # BLD AUTO: 1.12 K/UL — SIGNIFICANT CHANGE UP (ref 1–3.3)
LYMPHOCYTES # BLD AUTO: 1.4 K/UL — SIGNIFICANT CHANGE UP (ref 1–3.3)
LYMPHOCYTES # BLD AUTO: 1.43 K/UL — SIGNIFICANT CHANGE UP (ref 1–3.3)
LYMPHOCYTES # BLD AUTO: 1.61 K/UL — SIGNIFICANT CHANGE UP (ref 1–3.3)
LYMPHOCYTES # BLD AUTO: 1.78 K/UL — SIGNIFICANT CHANGE UP (ref 1–3.3)
LYMPHOCYTES # BLD AUTO: 1.89 K/UL — SIGNIFICANT CHANGE UP (ref 1–3.3)
LYMPHOCYTES # BLD AUTO: 12.4 % — LOW (ref 13–44)
LYMPHOCYTES # BLD AUTO: 2.23 K/UL — SIGNIFICANT CHANGE UP (ref 1–3.3)
LYMPHOCYTES # BLD AUTO: 2.6 % — LOW (ref 13–44)
LYMPHOCYTES # BLD AUTO: 2.6 % — LOW (ref 13–44)
LYMPHOCYTES # BLD AUTO: 2.9 % — LOW (ref 13–44)
LYMPHOCYTES # BLD AUTO: 4.1 % — LOW (ref 13–44)
LYMPHOCYTES # BLD AUTO: 5.2 % — LOW (ref 13–44)
LYMPHOCYTES # BLD AUTO: 6 % — LOW (ref 13–44)
LYMPHOCYTES # BLD AUTO: 6.5 % — LOW (ref 13–44)
LYMPHOCYTES # BLD AUTO: 6.6 % — LOW (ref 13–44)
LYMPHOCYTES # BLD AUTO: 7.1 % — LOW (ref 13–44)
LYMPHOCYTES # BLD AUTO: 8.1 % — LOW (ref 13–44)
LYMPHOCYTES # BLD AUTO: 9.1 % — LOW (ref 13–44)
LYMPHOCYTES # BLD AUTO: 9.2 % — LOW (ref 13–44)
LYMPHOCYTES # BLD AUTO: 9.8 % — LOW (ref 13–44)
LYMPHOCYTES # BLD AUTO: 9.9 % — LOW (ref 13–44)
MACROCYTES BLD QL: SLIGHT — SIGNIFICANT CHANGE UP
MACROCYTES BLD QL: SLIGHT — SIGNIFICANT CHANGE UP
MAGNESIUM SERPL-MCNC: 2.1 MG/DL — SIGNIFICANT CHANGE UP (ref 1.6–2.6)
MAGNESIUM SERPL-MCNC: 2.1 MG/DL — SIGNIFICANT CHANGE UP (ref 1.6–2.6)
MAGNESIUM SERPL-MCNC: 2.2 MG/DL — SIGNIFICANT CHANGE UP (ref 1.6–2.6)
MAGNESIUM SERPL-MCNC: 2.2 MG/DL — SIGNIFICANT CHANGE UP (ref 1.6–2.6)
MAGNESIUM SERPL-MCNC: 2.3 MG/DL — SIGNIFICANT CHANGE UP (ref 1.6–2.6)
MAGNESIUM SERPL-MCNC: 2.4 MG/DL — SIGNIFICANT CHANGE UP (ref 1.6–2.6)
MAGNESIUM SERPL-MCNC: 2.4 MG/DL — SIGNIFICANT CHANGE UP (ref 1.8–2.6)
MAGNESIUM SERPL-MCNC: 2.4 MG/DL — SIGNIFICANT CHANGE UP (ref 1.8–2.6)
MAGNESIUM SERPL-MCNC: 2.6 MG/DL — SIGNIFICANT CHANGE UP (ref 1.8–2.6)
MAGNESIUM SERPL-MCNC: 2.8 MG/DL — HIGH (ref 1.8–2.6)
MANUAL SMEAR VERIFICATION: SIGNIFICANT CHANGE UP
MANUAL SMEAR VERIFICATION: SIGNIFICANT CHANGE UP
MCHC RBC-ENTMCNC: 27.9 PG — SIGNIFICANT CHANGE UP (ref 27–34)
MCHC RBC-ENTMCNC: 28.2 PG — SIGNIFICANT CHANGE UP (ref 27–34)
MCHC RBC-ENTMCNC: 28.3 PG — SIGNIFICANT CHANGE UP (ref 27–34)
MCHC RBC-ENTMCNC: 28.3 PG — SIGNIFICANT CHANGE UP (ref 27–34)
MCHC RBC-ENTMCNC: 28.4 GM/DL — LOW (ref 32–36)
MCHC RBC-ENTMCNC: 28.4 PG — SIGNIFICANT CHANGE UP (ref 27–34)
MCHC RBC-ENTMCNC: 28.4 PG — SIGNIFICANT CHANGE UP (ref 27–34)
MCHC RBC-ENTMCNC: 28.5 GM/DL — LOW (ref 32–36)
MCHC RBC-ENTMCNC: 28.5 PG — SIGNIFICANT CHANGE UP (ref 27–34)
MCHC RBC-ENTMCNC: 28.7 PG — SIGNIFICANT CHANGE UP (ref 27–34)
MCHC RBC-ENTMCNC: 28.7 PG — SIGNIFICANT CHANGE UP (ref 27–34)
MCHC RBC-ENTMCNC: 28.8 PG — SIGNIFICANT CHANGE UP (ref 27–34)
MCHC RBC-ENTMCNC: 28.8 PG — SIGNIFICANT CHANGE UP (ref 27–34)
MCHC RBC-ENTMCNC: 29 GM/DL — LOW (ref 32–36)
MCHC RBC-ENTMCNC: 29 PG — SIGNIFICANT CHANGE UP (ref 27–34)
MCHC RBC-ENTMCNC: 29.2 PG — SIGNIFICANT CHANGE UP (ref 27–34)
MCHC RBC-ENTMCNC: 29.4 GM/DL — LOW (ref 32–36)
MCHC RBC-ENTMCNC: 30 GM/DL — LOW (ref 32–36)
MCHC RBC-ENTMCNC: 30.1 PG — SIGNIFICANT CHANGE UP (ref 27–34)
MCHC RBC-ENTMCNC: 30.2 GM/DL — LOW (ref 32–36)
MCHC RBC-ENTMCNC: 30.3 GM/DL — LOW (ref 32–36)
MCHC RBC-ENTMCNC: 30.3 GM/DL — LOW (ref 32–36)
MCHC RBC-ENTMCNC: 30.8 GM/DL — LOW (ref 32–36)
MCHC RBC-ENTMCNC: 30.9 GM/DL — LOW (ref 32–36)
MCHC RBC-ENTMCNC: 30.9 GM/DL — LOW (ref 32–36)
MCHC RBC-ENTMCNC: 31 GM/DL — LOW (ref 32–36)
MCHC RBC-ENTMCNC: 31.4 GM/DL — LOW (ref 32–36)
MCHC RBC-ENTMCNC: 31.5 GM/DL — LOW (ref 32–36)
MCHC RBC-ENTMCNC: 31.9 GM/DL — LOW (ref 32–36)
MCHC RBC-ENTMCNC: 32 GM/DL — SIGNIFICANT CHANGE UP (ref 32–36)
MCHC RBC-ENTMCNC: 32.1 GM/DL — SIGNIFICANT CHANGE UP (ref 32–36)
MCHC RBC-ENTMCNC: 32.3 GM/DL — SIGNIFICANT CHANGE UP (ref 32–36)
MCHC RBC-ENTMCNC: 32.7 GM/DL — SIGNIFICANT CHANGE UP (ref 32–36)
MCHC RBC-ENTMCNC: 33 GM/DL — SIGNIFICANT CHANGE UP (ref 32–36)
MCHC RBC-ENTMCNC: 33.1 GM/DL — SIGNIFICANT CHANGE UP (ref 32–36)
MCV RBC AUTO: 100 FL — SIGNIFICANT CHANGE UP (ref 80–100)
MCV RBC AUTO: 102.2 FL — HIGH (ref 80–100)
MCV RBC AUTO: 102.4 FL — HIGH (ref 80–100)
MCV RBC AUTO: 85.5 FL — SIGNIFICANT CHANGE UP (ref 80–100)
MCV RBC AUTO: 85.6 FL — SIGNIFICANT CHANGE UP (ref 80–100)
MCV RBC AUTO: 85.8 FL — SIGNIFICANT CHANGE UP (ref 80–100)
MCV RBC AUTO: 87.6 FL — SIGNIFICANT CHANGE UP (ref 80–100)
MCV RBC AUTO: 88.2 FL — SIGNIFICANT CHANGE UP (ref 80–100)
MCV RBC AUTO: 88.3 FL — SIGNIFICANT CHANGE UP (ref 80–100)
MCV RBC AUTO: 89.4 FL — SIGNIFICANT CHANGE UP (ref 80–100)
MCV RBC AUTO: 89.6 FL — SIGNIFICANT CHANGE UP (ref 80–100)
MCV RBC AUTO: 91.5 FL — SIGNIFICANT CHANGE UP (ref 80–100)
MCV RBC AUTO: 91.7 FL — SIGNIFICANT CHANGE UP (ref 80–100)
MCV RBC AUTO: 91.8 FL — SIGNIFICANT CHANGE UP (ref 80–100)
MCV RBC AUTO: 94.1 FL — SIGNIFICANT CHANGE UP (ref 80–100)
MCV RBC AUTO: 94.6 FL — SIGNIFICANT CHANGE UP (ref 80–100)
MCV RBC AUTO: 95 FL — SIGNIFICANT CHANGE UP (ref 80–100)
MCV RBC AUTO: 95 FL — SIGNIFICANT CHANGE UP (ref 80–100)
MCV RBC AUTO: 96.6 FL — SIGNIFICANT CHANGE UP (ref 80–100)
MCV RBC AUTO: 97.9 FL — SIGNIFICANT CHANGE UP (ref 80–100)
MCV RBC AUTO: 99.3 FL — SIGNIFICANT CHANGE UP (ref 80–100)
METAMYELOCYTES # FLD: 1.7 % — HIGH (ref 0–0)
METHOD TYPE: SIGNIFICANT CHANGE UP
MICROCYTES BLD QL: SLIGHT — SIGNIFICANT CHANGE UP
MONOCYTES # BLD AUTO: 0.14 K/UL — SIGNIFICANT CHANGE UP (ref 0–0.9)
MONOCYTES # BLD AUTO: 0.28 K/UL — SIGNIFICANT CHANGE UP (ref 0–0.9)
MONOCYTES # BLD AUTO: 0.29 K/UL — SIGNIFICANT CHANGE UP (ref 0–0.9)
MONOCYTES # BLD AUTO: 0.46 K/UL — SIGNIFICANT CHANGE UP (ref 0–0.9)
MONOCYTES # BLD AUTO: 0.51 K/UL — SIGNIFICANT CHANGE UP (ref 0–0.9)
MONOCYTES # BLD AUTO: 0.71 K/UL — SIGNIFICANT CHANGE UP (ref 0–0.9)
MONOCYTES # BLD AUTO: 0.74 K/UL — SIGNIFICANT CHANGE UP (ref 0–0.9)
MONOCYTES # BLD AUTO: 0.77 K/UL — SIGNIFICANT CHANGE UP (ref 0–0.9)
MONOCYTES # BLD AUTO: 0.95 K/UL — HIGH (ref 0–0.9)
MONOCYTES # BLD AUTO: 0.98 K/UL — HIGH (ref 0–0.9)
MONOCYTES # BLD AUTO: 1.01 K/UL — HIGH (ref 0–0.9)
MONOCYTES # BLD AUTO: 1.04 K/UL — HIGH (ref 0–0.9)
MONOCYTES # BLD AUTO: 1.05 K/UL — HIGH (ref 0–0.9)
MONOCYTES # BLD AUTO: 1.07 K/UL — HIGH (ref 0–0.9)
MONOCYTES # BLD AUTO: 1.4 K/UL — HIGH (ref 0–0.9)
MONOCYTES NFR BLD AUTO: 1.5 % — LOW (ref 2–14)
MONOCYTES NFR BLD AUTO: 1.7 % — LOW (ref 2–14)
MONOCYTES NFR BLD AUTO: 2.3 % — SIGNIFICANT CHANGE UP (ref 2–14)
MONOCYTES NFR BLD AUTO: 3.5 % — SIGNIFICANT CHANGE UP (ref 2–14)
MONOCYTES NFR BLD AUTO: 3.6 % — SIGNIFICANT CHANGE UP (ref 2–14)
MONOCYTES NFR BLD AUTO: 3.8 % — SIGNIFICANT CHANGE UP (ref 2–14)
MONOCYTES NFR BLD AUTO: 3.9 % — SIGNIFICANT CHANGE UP (ref 2–14)
MONOCYTES NFR BLD AUTO: 4.1 % — SIGNIFICANT CHANGE UP (ref 2–14)
MONOCYTES NFR BLD AUTO: 4.3 % — SIGNIFICANT CHANGE UP (ref 2–14)
MONOCYTES NFR BLD AUTO: 4.6 % — SIGNIFICANT CHANGE UP (ref 2–14)
MONOCYTES NFR BLD AUTO: 4.9 % — SIGNIFICANT CHANGE UP (ref 2–14)
MONOCYTES NFR BLD AUTO: 5 % — SIGNIFICANT CHANGE UP (ref 2–14)
MONOCYTES NFR BLD AUTO: 6.5 % — SIGNIFICANT CHANGE UP (ref 2–14)
MONOCYTES NFR BLD AUTO: 6.9 % — SIGNIFICANT CHANGE UP (ref 2–14)
MONOCYTES NFR BLD AUTO: 7.1 % — SIGNIFICANT CHANGE UP (ref 2–14)
MYELOCYTES NFR BLD: 3.5 % — HIGH (ref 0–0)
NEUTROPHILS # BLD AUTO: 11.22 K/UL — HIGH (ref 1.8–7.4)
NEUTROPHILS # BLD AUTO: 13.29 K/UL — HIGH (ref 1.8–7.4)
NEUTROPHILS # BLD AUTO: 13.53 K/UL — HIGH (ref 1.8–7.4)
NEUTROPHILS # BLD AUTO: 14.79 K/UL — HIGH (ref 1.8–7.4)
NEUTROPHILS # BLD AUTO: 15.58 K/UL — HIGH (ref 1.8–7.4)
NEUTROPHILS # BLD AUTO: 15.77 K/UL — HIGH (ref 1.8–7.4)
NEUTROPHILS # BLD AUTO: 15.85 K/UL — HIGH (ref 1.8–7.4)
NEUTROPHILS # BLD AUTO: 17.12 K/UL — HIGH (ref 1.8–7.4)
NEUTROPHILS # BLD AUTO: 17.25 K/UL — HIGH (ref 1.8–7.4)
NEUTROPHILS # BLD AUTO: 20.63 K/UL — HIGH (ref 1.8–7.4)
NEUTROPHILS # BLD AUTO: 24.08 K/UL — HIGH (ref 1.8–7.4)
NEUTROPHILS # BLD AUTO: 24.19 K/UL — HIGH (ref 1.8–7.4)
NEUTROPHILS # BLD AUTO: 4.67 K/UL — SIGNIFICANT CHANGE UP (ref 1.8–7.4)
NEUTROPHILS # BLD AUTO: 8.68 K/UL — HIGH (ref 1.8–7.4)
NEUTROPHILS # BLD AUTO: 9.28 K/UL — HIGH (ref 1.8–7.4)
NEUTROPHILS NFR BLD AUTO: 56.5 % — SIGNIFICANT CHANGE UP (ref 43–77)
NEUTROPHILS NFR BLD AUTO: 72.3 % — SIGNIFICANT CHANGE UP (ref 43–77)
NEUTROPHILS NFR BLD AUTO: 73.9 % — SIGNIFICANT CHANGE UP (ref 43–77)
NEUTROPHILS NFR BLD AUTO: 75.9 % — SIGNIFICANT CHANGE UP (ref 43–77)
NEUTROPHILS NFR BLD AUTO: 77 % — SIGNIFICANT CHANGE UP (ref 43–77)
NEUTROPHILS NFR BLD AUTO: 78.1 % — HIGH (ref 43–77)
NEUTROPHILS NFR BLD AUTO: 79.9 % — HIGH (ref 43–77)
NEUTROPHILS NFR BLD AUTO: 80.6 % — HIGH (ref 43–77)
NEUTROPHILS NFR BLD AUTO: 81.8 % — HIGH (ref 43–77)
NEUTROPHILS NFR BLD AUTO: 82.3 % — HIGH (ref 43–77)
NEUTROPHILS NFR BLD AUTO: 83 % — HIGH (ref 43–77)
NEUTROPHILS NFR BLD AUTO: 86.3 % — HIGH (ref 43–77)
NEUTROPHILS NFR BLD AUTO: 89.2 % — HIGH (ref 43–77)
NEUTROPHILS NFR BLD AUTO: 90.6 % — HIGH (ref 43–77)
NEUTROPHILS NFR BLD AUTO: 91.4 % — HIGH (ref 43–77)
NEUTS BAND # BLD: 18.3 % — HIGH (ref 0–8)
NEUTS BAND # BLD: 34.8 % — HIGH (ref 0–8)
NITRITE UR-MCNC: POSITIVE
NRBC # BLD: 1 /100 — HIGH (ref 0–0)
ORGANISM # SPEC MICROSCOPIC CNT: SIGNIFICANT CHANGE UP
OVALOCYTES BLD QL SMEAR: SLIGHT — SIGNIFICANT CHANGE UP
PCO2 BLDA: 42 MMHG — SIGNIFICANT CHANGE UP (ref 35–45)
PCO2 BLDA: 51 MMHG — HIGH (ref 35–45)
PCO2 BLDA: 54 MMHG — HIGH (ref 35–45)
PCO2 BLDA: 58 MMHG — HIGH (ref 35–45)
PCO2 BLDA: 63 MMHG — HIGH (ref 35–45)
PCO2 BLDA: 65 MMHG — HIGH (ref 35–45)
PCO2 BLDA: 68 MMHG — HIGH (ref 35–45)
PCO2 BLDA: 73 MMHG — CRITICAL HIGH (ref 35–45)
PCO2 BLDA: 76 MMHG — CRITICAL HIGH (ref 35–45)
PCO2 BLDA: 86 MMHG — CRITICAL HIGH (ref 35–45)
PF4 HEPARIN CMPLX AB SER-ACNC: POSITIVE — SIGNIFICANT CHANGE UP
PH BLDA: 7.29 — LOW (ref 7.35–7.45)
PH BLDA: 7.3 — LOW (ref 7.35–7.45)
PH BLDA: 7.3 — LOW (ref 7.35–7.45)
PH BLDA: 7.31 — LOW (ref 7.35–7.45)
PH BLDA: 7.32 — LOW (ref 7.35–7.45)
PH BLDA: 7.33 — LOW (ref 7.35–7.45)
PH BLDA: 7.34 — LOW (ref 7.35–7.45)
PH BLDA: 7.37 — SIGNIFICANT CHANGE UP (ref 7.35–7.45)
PH BLDA: 7.38 — SIGNIFICANT CHANGE UP (ref 7.35–7.45)
PH BLDA: 7.43 — SIGNIFICANT CHANGE UP (ref 7.35–7.45)
PH BLDA: 7.44 — SIGNIFICANT CHANGE UP (ref 7.35–7.45)
PH BLDA: 7.47 — HIGH (ref 7.35–7.45)
PH UR: 5 — SIGNIFICANT CHANGE UP (ref 5–8)
PHOSPHATE SERPL-MCNC: 1.1 MG/DL — LOW (ref 2.4–4.7)
PHOSPHATE SERPL-MCNC: 1.4 MG/DL — LOW (ref 2.4–4.7)
PHOSPHATE SERPL-MCNC: 2 MG/DL — LOW (ref 2.4–4.7)
PHOSPHATE SERPL-MCNC: 2.2 MG/DL — LOW (ref 2.4–4.7)
PHOSPHATE SERPL-MCNC: 2.3 MG/DL — LOW (ref 2.4–4.7)
PHOSPHATE SERPL-MCNC: 2.6 MG/DL — SIGNIFICANT CHANGE UP (ref 2.4–4.7)
PHOSPHATE SERPL-MCNC: 2.6 MG/DL — SIGNIFICANT CHANGE UP (ref 2.4–4.7)
PHOSPHATE SERPL-MCNC: 2.7 MG/DL — SIGNIFICANT CHANGE UP (ref 2.4–4.7)
PHOSPHATE SERPL-MCNC: 3 MG/DL — SIGNIFICANT CHANGE UP (ref 2.4–4.7)
PHOSPHATE SERPL-MCNC: 3.4 MG/DL — SIGNIFICANT CHANGE UP (ref 2.4–4.7)
PHOSPHATE SERPL-MCNC: 3.5 MG/DL — SIGNIFICANT CHANGE UP (ref 2.4–4.7)
PHOSPHATE SERPL-MCNC: 3.6 MG/DL — SIGNIFICANT CHANGE UP (ref 2.4–4.7)
PHOSPHATE SERPL-MCNC: 3.7 MG/DL — SIGNIFICANT CHANGE UP (ref 2.4–4.7)
PHOSPHATE SERPL-MCNC: 3.9 MG/DL — SIGNIFICANT CHANGE UP (ref 2.4–4.7)
PHOSPHATE SERPL-MCNC: 4.5 MG/DL — SIGNIFICANT CHANGE UP (ref 2.4–4.7)
PHOSPHATE SERPL-MCNC: 5.1 MG/DL — HIGH (ref 2.4–4.7)
PLAT MORPH BLD: NORMAL — SIGNIFICANT CHANGE UP
PLAT MORPH BLD: NORMAL — SIGNIFICANT CHANGE UP
PLATELET # BLD AUTO: 153 K/UL — SIGNIFICANT CHANGE UP (ref 150–400)
PLATELET # BLD AUTO: 170 K/UL — SIGNIFICANT CHANGE UP (ref 150–400)
PLATELET # BLD AUTO: 176 K/UL — SIGNIFICANT CHANGE UP (ref 150–400)
PLATELET # BLD AUTO: 178 K/UL — SIGNIFICANT CHANGE UP (ref 150–400)
PLATELET # BLD AUTO: 187 K/UL — SIGNIFICANT CHANGE UP (ref 150–400)
PLATELET # BLD AUTO: 215 K/UL — SIGNIFICANT CHANGE UP (ref 150–400)
PLATELET # BLD AUTO: 233 K/UL — SIGNIFICANT CHANGE UP (ref 150–400)
PLATELET # BLD AUTO: 239 K/UL — SIGNIFICANT CHANGE UP (ref 150–400)
PLATELET # BLD AUTO: 252 K/UL — SIGNIFICANT CHANGE UP (ref 150–400)
PLATELET # BLD AUTO: 261 K/UL — SIGNIFICANT CHANGE UP (ref 150–400)
PLATELET # BLD AUTO: 262 K/UL — SIGNIFICANT CHANGE UP (ref 150–400)
PLATELET # BLD AUTO: 268 K/UL — SIGNIFICANT CHANGE UP (ref 150–400)
PLATELET # BLD AUTO: 273 K/UL — SIGNIFICANT CHANGE UP (ref 150–400)
PLATELET # BLD AUTO: 276 K/UL — SIGNIFICANT CHANGE UP (ref 150–400)
PLATELET # BLD AUTO: 278 K/UL — SIGNIFICANT CHANGE UP (ref 150–400)
PLATELET # BLD AUTO: 280 K/UL — SIGNIFICANT CHANGE UP (ref 150–400)
PLATELET # BLD AUTO: 290 K/UL — SIGNIFICANT CHANGE UP (ref 150–400)
PLATELET # BLD AUTO: 345 K/UL — SIGNIFICANT CHANGE UP (ref 150–400)
PLATELET # BLD AUTO: 381 K/UL — SIGNIFICANT CHANGE UP (ref 150–400)
PLATELET # BLD AUTO: 421 K/UL — HIGH (ref 150–400)
PLATELET # BLD AUTO: 613 K/UL — HIGH (ref 150–400)
PLATELET COUNT - ESTIMATE: NORMAL — SIGNIFICANT CHANGE UP
PO2 BLDA: 54 MMHG — LOW (ref 83–108)
PO2 BLDA: 60 MMHG — LOW (ref 83–108)
PO2 BLDA: 66 MMHG — LOW (ref 83–108)
PO2 BLDA: 72 MMHG — LOW (ref 83–108)
PO2 BLDA: 75 MMHG — LOW (ref 83–108)
PO2 BLDA: 82 MMHG — LOW (ref 83–108)
PO2 BLDA: 85 MMHG — SIGNIFICANT CHANGE UP (ref 83–108)
PO2 BLDA: 85 MMHG — SIGNIFICANT CHANGE UP (ref 83–108)
PO2 BLDA: 94 MMHG — SIGNIFICANT CHANGE UP (ref 83–108)
POIKILOCYTOSIS BLD QL AUTO: SLIGHT — SIGNIFICANT CHANGE UP
POLYCHROMASIA BLD QL SMEAR: SIGNIFICANT CHANGE UP
POLYCHROMASIA BLD QL SMEAR: SLIGHT — SIGNIFICANT CHANGE UP
POTASSIUM SERPL-MCNC: 3.8 MMOL/L — SIGNIFICANT CHANGE UP (ref 3.5–5.3)
POTASSIUM SERPL-MCNC: 3.9 MMOL/L — SIGNIFICANT CHANGE UP (ref 3.5–5.3)
POTASSIUM SERPL-MCNC: 4 MMOL/L — SIGNIFICANT CHANGE UP (ref 3.5–5.3)
POTASSIUM SERPL-MCNC: 4 MMOL/L — SIGNIFICANT CHANGE UP (ref 3.5–5.3)
POTASSIUM SERPL-MCNC: 4.2 MMOL/L — SIGNIFICANT CHANGE UP (ref 3.5–5.3)
POTASSIUM SERPL-MCNC: 4.3 MMOL/L — SIGNIFICANT CHANGE UP (ref 3.5–5.3)
POTASSIUM SERPL-MCNC: 4.3 MMOL/L — SIGNIFICANT CHANGE UP (ref 3.5–5.3)
POTASSIUM SERPL-MCNC: 4.4 MMOL/L — SIGNIFICANT CHANGE UP (ref 3.5–5.3)
POTASSIUM SERPL-MCNC: 4.4 MMOL/L — SIGNIFICANT CHANGE UP (ref 3.5–5.3)
POTASSIUM SERPL-MCNC: 4.5 MMOL/L — SIGNIFICANT CHANGE UP (ref 3.5–5.3)
POTASSIUM SERPL-MCNC: 4.6 MMOL/L — SIGNIFICANT CHANGE UP (ref 3.5–5.3)
POTASSIUM SERPL-MCNC: 4.6 MMOL/L — SIGNIFICANT CHANGE UP (ref 3.5–5.3)
POTASSIUM SERPL-MCNC: 4.7 MMOL/L — SIGNIFICANT CHANGE UP (ref 3.5–5.3)
POTASSIUM SERPL-MCNC: 4.7 MMOL/L — SIGNIFICANT CHANGE UP (ref 3.5–5.3)
POTASSIUM SERPL-MCNC: 4.9 MMOL/L — SIGNIFICANT CHANGE UP (ref 3.5–5.3)
POTASSIUM SERPL-MCNC: 5 MMOL/L — SIGNIFICANT CHANGE UP (ref 3.5–5.3)
POTASSIUM SERPL-MCNC: 5.2 MMOL/L — SIGNIFICANT CHANGE UP (ref 3.5–5.3)
POTASSIUM SERPL-SCNC: 3.8 MMOL/L — SIGNIFICANT CHANGE UP (ref 3.5–5.3)
POTASSIUM SERPL-SCNC: 3.9 MMOL/L — SIGNIFICANT CHANGE UP (ref 3.5–5.3)
POTASSIUM SERPL-SCNC: 4 MMOL/L — SIGNIFICANT CHANGE UP (ref 3.5–5.3)
POTASSIUM SERPL-SCNC: 4 MMOL/L — SIGNIFICANT CHANGE UP (ref 3.5–5.3)
POTASSIUM SERPL-SCNC: 4.2 MMOL/L — SIGNIFICANT CHANGE UP (ref 3.5–5.3)
POTASSIUM SERPL-SCNC: 4.3 MMOL/L — SIGNIFICANT CHANGE UP (ref 3.5–5.3)
POTASSIUM SERPL-SCNC: 4.3 MMOL/L — SIGNIFICANT CHANGE UP (ref 3.5–5.3)
POTASSIUM SERPL-SCNC: 4.4 MMOL/L — SIGNIFICANT CHANGE UP (ref 3.5–5.3)
POTASSIUM SERPL-SCNC: 4.4 MMOL/L — SIGNIFICANT CHANGE UP (ref 3.5–5.3)
POTASSIUM SERPL-SCNC: 4.5 MMOL/L — SIGNIFICANT CHANGE UP (ref 3.5–5.3)
POTASSIUM SERPL-SCNC: 4.6 MMOL/L — SIGNIFICANT CHANGE UP (ref 3.5–5.3)
POTASSIUM SERPL-SCNC: 4.6 MMOL/L — SIGNIFICANT CHANGE UP (ref 3.5–5.3)
POTASSIUM SERPL-SCNC: 4.7 MMOL/L — SIGNIFICANT CHANGE UP (ref 3.5–5.3)
POTASSIUM SERPL-SCNC: 4.7 MMOL/L — SIGNIFICANT CHANGE UP (ref 3.5–5.3)
POTASSIUM SERPL-SCNC: 4.9 MMOL/L — SIGNIFICANT CHANGE UP (ref 3.5–5.3)
POTASSIUM SERPL-SCNC: 5 MMOL/L — SIGNIFICANT CHANGE UP (ref 3.5–5.3)
POTASSIUM SERPL-SCNC: 5.2 MMOL/L — SIGNIFICANT CHANGE UP (ref 3.5–5.3)
PROCALCITONIN SERPL-MCNC: 0.08 NG/ML — SIGNIFICANT CHANGE UP (ref 0.02–0.1)
PROCALCITONIN SERPL-MCNC: 0.1 NG/ML — SIGNIFICANT CHANGE UP (ref 0.02–0.1)
PROCALCITONIN SERPL-MCNC: 0.11 NG/ML — HIGH (ref 0.02–0.1)
PROCALCITONIN SERPL-MCNC: 0.23 NG/ML — HIGH (ref 0.02–0.1)
PROCALCITONIN SERPL-MCNC: 0.23 NG/ML — HIGH (ref 0.02–0.1)
PROCALCITONIN SERPL-MCNC: 0.26 NG/ML — HIGH (ref 0.02–0.1)
PROCALCITONIN SERPL-MCNC: 0.27 NG/ML — HIGH (ref 0.02–0.1)
PROT SERPL-MCNC: 5.4 G/DL — LOW (ref 6.6–8.7)
PROT SERPL-MCNC: 5.7 G/DL — LOW (ref 6.6–8.7)
PROT SERPL-MCNC: 5.8 G/DL — LOW (ref 6.6–8.7)
PROT SERPL-MCNC: 5.9 G/DL — LOW (ref 6.6–8.7)
PROT SERPL-MCNC: 6 G/DL — LOW (ref 6.6–8.7)
PROT SERPL-MCNC: 6 G/DL — LOW (ref 6.6–8.7)
PROT SERPL-MCNC: 6.1 G/DL — LOW (ref 6.6–8.7)
PROT SERPL-MCNC: 6.1 G/DL — LOW (ref 6.6–8.7)
PROT SERPL-MCNC: 6.2 G/DL — LOW (ref 6.6–8.7)
PROT SERPL-MCNC: 6.3 G/DL — LOW (ref 6.6–8.7)
PROT SERPL-MCNC: 6.5 G/DL — LOW (ref 6.6–8.7)
PROT SERPL-MCNC: 6.6 G/DL — SIGNIFICANT CHANGE UP (ref 6.6–8.7)
PROT SERPL-MCNC: 6.6 G/DL — SIGNIFICANT CHANGE UP (ref 6.6–8.7)
PROT SERPL-MCNC: 7 G/DL — SIGNIFICANT CHANGE UP (ref 6.6–8.7)
PROT SERPL-MCNC: 8.1 G/DL — SIGNIFICANT CHANGE UP (ref 6.6–8.7)
PROT UR-MCNC: 30 MG/DL
PROTHROM AB SERPL-ACNC: 15.2 SEC — HIGH (ref 10–12.9)
RBC # BLD: 2.62 M/UL — LOW (ref 3.8–5.2)
RBC # BLD: 2.72 M/UL — LOW (ref 3.8–5.2)
RBC # BLD: 2.81 M/UL — LOW (ref 3.8–5.2)
RBC # BLD: 2.88 M/UL — LOW (ref 3.8–5.2)
RBC # BLD: 2.92 M/UL — LOW (ref 3.8–5.2)
RBC # BLD: 2.97 M/UL — LOW (ref 3.8–5.2)
RBC # BLD: 3.14 M/UL — LOW (ref 3.8–5.2)
RBC # BLD: 3.2 M/UL — LOW (ref 3.8–5.2)
RBC # BLD: 3.23 M/UL — LOW (ref 3.8–5.2)
RBC # BLD: 3.32 M/UL — LOW (ref 3.8–5.2)
RBC # BLD: 3.9 M/UL — SIGNIFICANT CHANGE UP (ref 3.8–5.2)
RBC # BLD: 4.2 M/UL — SIGNIFICANT CHANGE UP (ref 3.8–5.2)
RBC # BLD: 4.25 M/UL — SIGNIFICANT CHANGE UP (ref 3.8–5.2)
RBC # BLD: 4.36 M/UL — SIGNIFICANT CHANGE UP (ref 3.8–5.2)
RBC # BLD: 4.43 M/UL — SIGNIFICANT CHANGE UP (ref 3.8–5.2)
RBC # BLD: 4.6 M/UL — SIGNIFICANT CHANGE UP (ref 3.8–5.2)
RBC # BLD: 4.69 M/UL — SIGNIFICANT CHANGE UP (ref 3.8–5.2)
RBC # BLD: 4.71 M/UL — SIGNIFICANT CHANGE UP (ref 3.8–5.2)
RBC # BLD: 4.79 M/UL — SIGNIFICANT CHANGE UP (ref 3.8–5.2)
RBC # BLD: 4.89 M/UL — SIGNIFICANT CHANGE UP (ref 3.8–5.2)
RBC # BLD: 4.91 M/UL — SIGNIFICANT CHANGE UP (ref 3.8–5.2)
RBC # FLD: 13.1 % — SIGNIFICANT CHANGE UP (ref 10.3–14.5)
RBC # FLD: 13.2 % — SIGNIFICANT CHANGE UP (ref 10.3–14.5)
RBC # FLD: 13.2 % — SIGNIFICANT CHANGE UP (ref 10.3–14.5)
RBC # FLD: 13.5 % — SIGNIFICANT CHANGE UP (ref 10.3–14.5)
RBC # FLD: 13.6 % — SIGNIFICANT CHANGE UP (ref 10.3–14.5)
RBC # FLD: 13.9 % — SIGNIFICANT CHANGE UP (ref 10.3–14.5)
RBC # FLD: 14 % — SIGNIFICANT CHANGE UP (ref 10.3–14.5)
RBC # FLD: 14.3 % — SIGNIFICANT CHANGE UP (ref 10.3–14.5)
RBC # FLD: 14.5 % — SIGNIFICANT CHANGE UP (ref 10.3–14.5)
RBC # FLD: 14.6 % — HIGH (ref 10.3–14.5)
RBC # FLD: 14.7 % — HIGH (ref 10.3–14.5)
RBC # FLD: 15.3 % — HIGH (ref 10.3–14.5)
RBC # FLD: 15.7 % — HIGH (ref 10.3–14.5)
RBC # FLD: 16.3 % — HIGH (ref 10.3–14.5)
RBC # FLD: 16.5 % — HIGH (ref 10.3–14.5)
RBC # FLD: 17.4 % — HIGH (ref 10.3–14.5)
RBC # FLD: 17.6 % — HIGH (ref 10.3–14.5)
RBC # FLD: 17.9 % — HIGH (ref 10.3–14.5)
RBC # FLD: 18 % — HIGH (ref 10.3–14.5)
RBC BLD AUTO: ABNORMAL
RBC BLD AUTO: ABNORMAL
RBC CASTS # UR COMP ASSIST: ABNORMAL /HPF (ref 0–4)
SAO2 % BLDA: 89 % — LOW (ref 95–99)
SAO2 % BLDA: 91 % — LOW (ref 95–99)
SAO2 % BLDA: 92 % — SIGNIFICANT CHANGE UP (ref 92–96)
SAO2 % BLDA: 93 % — LOW (ref 95–99)
SAO2 % BLDA: 95 % — SIGNIFICANT CHANGE UP (ref 92–96)
SAO2 % BLDA: 95 % — SIGNIFICANT CHANGE UP (ref 95–99)
SAO2 % BLDA: 95 % — SIGNIFICANT CHANGE UP (ref 95–99)
SAO2 % BLDA: 96 % — SIGNIFICANT CHANGE UP (ref 92–96)
SAO2 % BLDA: 96 % — SIGNIFICANT CHANGE UP (ref 92–96)
SAO2 % BLDA: 97 % — SIGNIFICANT CHANGE UP (ref 95–99)
SAO2 % BLDA: 98 % — HIGH (ref 92–96)
SAO2 % BLDA: 98 % — SIGNIFICANT CHANGE UP (ref 95–99)
SARS-COV-2 RNA SPEC QL NAA+PROBE: (no result)
SCHISTOCYTES BLD QL AUTO: SLIGHT — SIGNIFICANT CHANGE UP
SODIUM SERPL-SCNC: 136 MMOL/L — SIGNIFICANT CHANGE UP (ref 135–145)
SODIUM SERPL-SCNC: 137 MMOL/L — SIGNIFICANT CHANGE UP (ref 135–145)
SODIUM SERPL-SCNC: 139 MMOL/L — SIGNIFICANT CHANGE UP (ref 135–145)
SODIUM SERPL-SCNC: 140 MMOL/L — SIGNIFICANT CHANGE UP (ref 135–145)
SODIUM SERPL-SCNC: 141 MMOL/L — SIGNIFICANT CHANGE UP (ref 135–145)
SODIUM SERPL-SCNC: 142 MMOL/L — SIGNIFICANT CHANGE UP (ref 135–145)
SODIUM SERPL-SCNC: 143 MMOL/L — SIGNIFICANT CHANGE UP (ref 135–145)
SODIUM SERPL-SCNC: 145 MMOL/L — SIGNIFICANT CHANGE UP (ref 135–145)
SODIUM SERPL-SCNC: 146 MMOL/L — HIGH (ref 135–145)
SODIUM SERPL-SCNC: 150 MMOL/L — HIGH (ref 135–145)
SP GR SPEC: 1.02 — SIGNIFICANT CHANGE UP (ref 1.01–1.02)
SPECIMEN SOURCE: SIGNIFICANT CHANGE UP
SRA INTERP SER-IMP: SIGNIFICANT CHANGE UP
STOMATOCYTES BLD QL SMEAR: SIGNIFICANT CHANGE UP
STOMATOCYTES BLD QL SMEAR: SLIGHT — SIGNIFICANT CHANGE UP
T3 SERPL-MCNC: <40 NG/DL — LOW (ref 80–200)
T4 AB SER-ACNC: 3.8 UG/DL — LOW (ref 4.5–12)
T4 AB SER-ACNC: 4.2 UG/DL — LOW (ref 4.5–12)
TARGETS BLD QL SMEAR: SLIGHT — SIGNIFICANT CHANGE UP
TRIGL SERPL-MCNC: 430 MG/DL — HIGH (ref 10–200)
TRIGL SERPL-MCNC: 446 MG/DL — HIGH (ref 10–200)
TSH SERPL-MCNC: 11.07 UIU/ML — HIGH (ref 0.27–4.2)
TSH SERPL-MCNC: 15.84 UIU/ML — HIGH (ref 0.27–4.2)
TSH SERPL-MCNC: 33.58 UIU/ML — HIGH (ref 0.27–4.2)
TSH SERPL-MCNC: 6.72 UIU/ML — HIGH (ref 0.27–4.2)
UROBILINOGEN FLD QL: 8
VARIANT LYMPHS # BLD: 0.9 % — SIGNIFICANT CHANGE UP (ref 0–6)
WBC # BLD: 11.88 K/UL — HIGH (ref 3.8–10.5)
WBC # BLD: 13.64 K/UL — HIGH (ref 3.8–10.5)
WBC # BLD: 14.67 K/UL — HIGH (ref 3.8–10.5)
WBC # BLD: 14.96 K/UL — HIGH (ref 3.8–10.5)
WBC # BLD: 15.52 K/UL — HIGH (ref 3.8–10.5)
WBC # BLD: 16.13 K/UL — HIGH (ref 3.8–10.5)
WBC # BLD: 17.27 K/UL — HIGH (ref 3.8–10.5)
WBC # BLD: 19.19 K/UL — HIGH (ref 3.8–10.5)
WBC # BLD: 19.52 K/UL — HIGH (ref 3.8–10.5)
WBC # BLD: 19.66 K/UL — HIGH (ref 3.8–10.5)
WBC # BLD: 19.96 K/UL — HIGH (ref 3.8–10.5)
WBC # BLD: 20.75 K/UL — HIGH (ref 3.8–10.5)
WBC # BLD: 22.58 K/UL — HIGH (ref 3.8–10.5)
WBC # BLD: 24.84 K/UL — HIGH (ref 3.8–10.5)
WBC # BLD: 26 K/UL — HIGH (ref 3.8–10.5)
WBC # BLD: 26.71 K/UL — HIGH (ref 3.8–10.5)
WBC # BLD: 26.72 K/UL — HIGH (ref 3.8–10.5)
WBC # BLD: 27.05 K/UL — HIGH (ref 3.8–10.5)
WBC # BLD: 29.02 K/UL — HIGH (ref 3.8–10.5)
WBC # BLD: 5.71 K/UL — SIGNIFICANT CHANGE UP (ref 3.8–10.5)
WBC # BLD: 9.5 K/UL — SIGNIFICANT CHANGE UP (ref 3.8–10.5)
WBC # FLD AUTO: 11.88 K/UL — HIGH (ref 3.8–10.5)
WBC # FLD AUTO: 13.64 K/UL — HIGH (ref 3.8–10.5)
WBC # FLD AUTO: 14.67 K/UL — HIGH (ref 3.8–10.5)
WBC # FLD AUTO: 14.96 K/UL — HIGH (ref 3.8–10.5)
WBC # FLD AUTO: 15.52 K/UL — HIGH (ref 3.8–10.5)
WBC # FLD AUTO: 16.13 K/UL — HIGH (ref 3.8–10.5)
WBC # FLD AUTO: 17.27 K/UL — HIGH (ref 3.8–10.5)
WBC # FLD AUTO: 19.19 K/UL — HIGH (ref 3.8–10.5)
WBC # FLD AUTO: 19.52 K/UL — HIGH (ref 3.8–10.5)
WBC # FLD AUTO: 19.66 K/UL — HIGH (ref 3.8–10.5)
WBC # FLD AUTO: 19.96 K/UL — HIGH (ref 3.8–10.5)
WBC # FLD AUTO: 20.75 K/UL — HIGH (ref 3.8–10.5)
WBC # FLD AUTO: 22.58 K/UL — HIGH (ref 3.8–10.5)
WBC # FLD AUTO: 24.84 K/UL — HIGH (ref 3.8–10.5)
WBC # FLD AUTO: 26 K/UL — HIGH (ref 3.8–10.5)
WBC # FLD AUTO: 26.71 K/UL — HIGH (ref 3.8–10.5)
WBC # FLD AUTO: 26.72 K/UL — HIGH (ref 3.8–10.5)
WBC # FLD AUTO: 27.05 K/UL — HIGH (ref 3.8–10.5)
WBC # FLD AUTO: 29.02 K/UL — HIGH (ref 3.8–10.5)
WBC # FLD AUTO: 5.71 K/UL — SIGNIFICANT CHANGE UP (ref 3.8–10.5)
WBC # FLD AUTO: 9.5 K/UL — SIGNIFICANT CHANGE UP (ref 3.8–10.5)
WBC UR QL: SIGNIFICANT CHANGE UP

## 2020-01-01 PROCEDURE — 80053 COMPREHEN METABOLIC PANEL: CPT

## 2020-01-01 PROCEDURE — 71045 X-RAY EXAM CHEST 1 VIEW: CPT | Mod: 26

## 2020-01-01 PROCEDURE — 99233 SBSQ HOSP IP/OBS HIGH 50: CPT

## 2020-01-01 PROCEDURE — 86850 RBC ANTIBODY SCREEN: CPT

## 2020-01-01 PROCEDURE — 99223 1ST HOSP IP/OBS HIGH 75: CPT

## 2020-01-01 PROCEDURE — 84702 CHORIONIC GONADOTROPIN TEST: CPT

## 2020-01-01 PROCEDURE — 99291 CRITICAL CARE FIRST HOUR: CPT

## 2020-01-01 PROCEDURE — 83615 LACTATE (LD) (LDH) ENZYME: CPT

## 2020-01-01 PROCEDURE — 94760 N-INVAS EAR/PLS OXIMETRY 1: CPT

## 2020-01-01 PROCEDURE — 85014 HEMATOCRIT: CPT

## 2020-01-01 PROCEDURE — 93010 ELECTROCARDIOGRAM REPORT: CPT

## 2020-01-01 PROCEDURE — 36600 WITHDRAWAL OF ARTERIAL BLOOD: CPT

## 2020-01-01 PROCEDURE — 93970 EXTREMITY STUDY: CPT | Mod: 26

## 2020-01-01 PROCEDURE — 84478 ASSAY OF TRIGLYCERIDES: CPT

## 2020-01-01 PROCEDURE — 99232 SBSQ HOSP IP/OBS MODERATE 35: CPT

## 2020-01-01 PROCEDURE — 94002 VENT MGMT INPAT INIT DAY: CPT

## 2020-01-01 PROCEDURE — 83605 ASSAY OF LACTIC ACID: CPT

## 2020-01-01 PROCEDURE — 86901 BLOOD TYPING SEROLOGIC RH(D): CPT

## 2020-01-01 PROCEDURE — 71275 CT ANGIOGRAPHY CHEST: CPT | Mod: 26

## 2020-01-01 PROCEDURE — 87070 CULTURE OTHR SPECIMN AEROBIC: CPT

## 2020-01-01 PROCEDURE — 87635 SARS-COV-2 COVID-19 AMP PRB: CPT

## 2020-01-01 PROCEDURE — 82435 ASSAY OF BLOOD CHLORIDE: CPT

## 2020-01-01 PROCEDURE — 86022 PLATELET ANTIBODIES: CPT

## 2020-01-01 PROCEDURE — 99285 EMERGENCY DEPT VISIT HI MDM: CPT

## 2020-01-01 PROCEDURE — 83735 ASSAY OF MAGNESIUM: CPT

## 2020-01-01 PROCEDURE — 84480 ASSAY TRIIODOTHYRONINE (T3): CPT

## 2020-01-01 PROCEDURE — 99233 SBSQ HOSP IP/OBS HIGH 50: CPT | Mod: GC

## 2020-01-01 PROCEDURE — 94003 VENT MGMT INPAT SUBQ DAY: CPT

## 2020-01-01 PROCEDURE — 84145 PROCALCITONIN (PCT): CPT

## 2020-01-01 PROCEDURE — 87186 SC STD MICRODIL/AGAR DIL: CPT

## 2020-01-01 PROCEDURE — 85379 FIBRIN DEGRADATION QUANT: CPT

## 2020-01-01 PROCEDURE — 85730 THROMBOPLASTIN TIME PARTIAL: CPT

## 2020-01-01 PROCEDURE — 82947 ASSAY GLUCOSE BLOOD QUANT: CPT

## 2020-01-01 PROCEDURE — 71275 CT ANGIOGRAPHY CHEST: CPT

## 2020-01-01 PROCEDURE — 99497 ADVNCD CARE PLAN 30 MIN: CPT | Mod: 25

## 2020-01-01 PROCEDURE — 84295 ASSAY OF SERUM SODIUM: CPT

## 2020-01-01 PROCEDURE — 86140 C-REACTIVE PROTEIN: CPT

## 2020-01-01 PROCEDURE — 82962 GLUCOSE BLOOD TEST: CPT

## 2020-01-01 PROCEDURE — 99233 SBSQ HOSP IP/OBS HIGH 50: CPT | Mod: 25

## 2020-01-01 PROCEDURE — 82330 ASSAY OF CALCIUM: CPT

## 2020-01-01 PROCEDURE — 76705 ECHO EXAM OF ABDOMEN: CPT | Mod: 26

## 2020-01-01 PROCEDURE — 36415 COLL VENOUS BLD VENIPUNCTURE: CPT

## 2020-01-01 PROCEDURE — 84443 ASSAY THYROID STIM HORMONE: CPT

## 2020-01-01 PROCEDURE — 93970 EXTREMITY STUDY: CPT

## 2020-01-01 PROCEDURE — 84100 ASSAY OF PHOSPHORUS: CPT

## 2020-01-01 PROCEDURE — 84132 ASSAY OF SERUM POTASSIUM: CPT

## 2020-01-01 PROCEDURE — 99254 IP/OBS CNSLTJ NEW/EST MOD 60: CPT

## 2020-01-01 PROCEDURE — 86900 BLOOD TYPING SEROLOGIC ABO: CPT

## 2020-01-01 PROCEDURE — 84436 ASSAY OF TOTAL THYROXINE: CPT

## 2020-01-01 PROCEDURE — 36430 TRANSFUSION BLD/BLD COMPNT: CPT

## 2020-01-01 PROCEDURE — T1013: CPT

## 2020-01-01 PROCEDURE — 87040 BLOOD CULTURE FOR BACTERIA: CPT

## 2020-01-01 PROCEDURE — 94640 AIRWAY INHALATION TREATMENT: CPT

## 2020-01-01 PROCEDURE — 80048 BASIC METABOLIC PNL TOTAL CA: CPT

## 2020-01-01 PROCEDURE — 71045 X-RAY EXAM CHEST 1 VIEW: CPT

## 2020-01-01 PROCEDURE — 71045 X-RAY EXAM CHEST 1 VIEW: CPT | Mod: 26,76

## 2020-01-01 PROCEDURE — 85610 PROTHROMBIN TIME: CPT

## 2020-01-01 PROCEDURE — 85027 COMPLETE CBC AUTOMATED: CPT

## 2020-01-01 PROCEDURE — 81001 URINALYSIS AUTO W/SCOPE: CPT

## 2020-01-01 PROCEDURE — 93308 TTE F-UP OR LMTD: CPT | Mod: 26

## 2020-01-01 PROCEDURE — 87150 DNA/RNA AMPLIFIED PROBE: CPT

## 2020-01-01 PROCEDURE — 93308 TTE F-UP OR LMTD: CPT

## 2020-01-01 PROCEDURE — 82150 ASSAY OF AMYLASE: CPT

## 2020-01-01 PROCEDURE — 99497 ADVNCD CARE PLAN 30 MIN: CPT

## 2020-01-01 PROCEDURE — 76705 ECHO EXAM OF ABDOMEN: CPT

## 2020-01-01 PROCEDURE — 82728 ASSAY OF FERRITIN: CPT

## 2020-01-01 PROCEDURE — 94799 UNLISTED PULMONARY SVC/PX: CPT

## 2020-01-01 PROCEDURE — 87086 URINE CULTURE/COLONY COUNT: CPT

## 2020-01-01 PROCEDURE — 93005 ELECTROCARDIOGRAM TRACING: CPT

## 2020-01-01 PROCEDURE — 82803 BLOOD GASES ANY COMBINATION: CPT

## 2020-01-01 RX ORDER — ROCURONIUM BROMIDE 10 MG/ML
50 VIAL (ML) INTRAVENOUS ONCE
Refills: 0 | Status: COMPLETED | OUTPATIENT
Start: 2020-01-01 | End: 2020-01-01

## 2020-01-01 RX ORDER — DEXTROSE 50 % IN WATER 50 %
25 SYRINGE (ML) INTRAVENOUS ONCE
Refills: 0 | Status: DISCONTINUED | OUTPATIENT
Start: 2020-01-01 | End: 2020-01-01

## 2020-01-01 RX ORDER — INSULIN LISPRO 100/ML
VIAL (ML) SUBCUTANEOUS EVERY 6 HOURS
Refills: 0 | Status: DISCONTINUED | OUTPATIENT
Start: 2020-01-01 | End: 2020-01-01

## 2020-01-01 RX ORDER — ENOXAPARIN SODIUM 100 MG/ML
80 INJECTION SUBCUTANEOUS EVERY 12 HOURS
Refills: 0 | Status: DISCONTINUED | OUTPATIENT
Start: 2020-01-01 | End: 2020-01-01

## 2020-01-01 RX ORDER — ACETAMINOPHEN 500 MG
1000 TABLET ORAL ONCE
Refills: 0 | Status: COMPLETED | OUTPATIENT
Start: 2020-01-01 | End: 2020-01-01

## 2020-01-01 RX ORDER — PIPERACILLIN AND TAZOBACTAM 4; .5 G/20ML; G/20ML
3.38 INJECTION, POWDER, LYOPHILIZED, FOR SOLUTION INTRAVENOUS ONCE
Refills: 0 | Status: COMPLETED | OUTPATIENT
Start: 2020-01-01 | End: 2020-01-01

## 2020-01-01 RX ORDER — LEVOTHYROXINE SODIUM 125 MCG
1 TABLET ORAL
Qty: 0 | Refills: 0 | DISCHARGE

## 2020-01-01 RX ORDER — PROPOFOL 10 MG/ML
20 INJECTION, EMULSION INTRAVENOUS
Qty: 1000 | Refills: 0 | Status: DISCONTINUED | OUTPATIENT
Start: 2020-01-01 | End: 2020-01-01

## 2020-01-01 RX ORDER — ACETAMINOPHEN 500 MG
650 TABLET ORAL ONCE
Refills: 0 | Status: COMPLETED | OUTPATIENT
Start: 2020-01-01 | End: 2020-01-01

## 2020-01-01 RX ORDER — ENOXAPARIN SODIUM 100 MG/ML
80 INJECTION SUBCUTANEOUS
Refills: 0 | Status: DISCONTINUED | OUTPATIENT
Start: 2020-01-01 | End: 2020-01-01

## 2020-01-01 RX ORDER — MIDAZOLAM HYDROCHLORIDE 1 MG/ML
2 INJECTION, SOLUTION INTRAMUSCULAR; INTRAVENOUS ONCE
Refills: 0 | Status: DISCONTINUED | OUTPATIENT
Start: 2020-01-01 | End: 2020-01-01

## 2020-01-01 RX ORDER — MORPHINE SULFATE 50 MG/1
2 CAPSULE, EXTENDED RELEASE ORAL ONCE
Refills: 0 | Status: DISCONTINUED | OUTPATIENT
Start: 2020-01-01 | End: 2020-01-01

## 2020-01-01 RX ORDER — CHLORHEXIDINE GLUCONATE 213 G/1000ML
15 SOLUTION TOPICAL EVERY 12 HOURS
Refills: 0 | Status: DISCONTINUED | OUTPATIENT
Start: 2020-01-01 | End: 2020-01-01

## 2020-01-01 RX ORDER — MIDODRINE HYDROCHLORIDE 2.5 MG/1
15 TABLET ORAL EVERY 8 HOURS
Refills: 0 | Status: DISCONTINUED | OUTPATIENT
Start: 2020-01-01 | End: 2020-01-01

## 2020-01-01 RX ORDER — GLUCAGON INJECTION, SOLUTION 0.5 MG/.1ML
1 INJECTION, SOLUTION SUBCUTANEOUS ONCE
Refills: 0 | Status: DISCONTINUED | OUTPATIENT
Start: 2020-01-01 | End: 2020-01-01

## 2020-01-01 RX ORDER — HEPARIN SODIUM 5000 [USP'U]/ML
3000 INJECTION INTRAVENOUS; SUBCUTANEOUS EVERY 6 HOURS
Refills: 0 | Status: DISCONTINUED | OUTPATIENT
Start: 2020-01-01 | End: 2020-01-01

## 2020-01-01 RX ORDER — FENTANYL CITRATE 50 UG/ML
100 INJECTION INTRAVENOUS ONCE
Refills: 0 | Status: DISCONTINUED | OUTPATIENT
Start: 2020-01-01 | End: 2020-01-01

## 2020-01-01 RX ORDER — FENTANYL CITRATE 50 UG/ML
200 INJECTION INTRAVENOUS ONCE
Refills: 0 | Status: DISCONTINUED | OUTPATIENT
Start: 2020-01-01 | End: 2020-01-01

## 2020-01-01 RX ORDER — CEFTRIAXONE 500 MG/1
INJECTION, POWDER, FOR SOLUTION INTRAMUSCULAR; INTRAVENOUS
Refills: 0 | Status: DISCONTINUED | OUTPATIENT
Start: 2020-01-01 | End: 2020-01-01

## 2020-01-01 RX ORDER — CHLORHEXIDINE GLUCONATE 213 G/1000ML
1 SOLUTION TOPICAL
Refills: 0 | Status: DISCONTINUED | OUTPATIENT
Start: 2020-01-01 | End: 2020-01-01

## 2020-01-01 RX ORDER — DEXTROSE 50 % IN WATER 50 %
15 SYRINGE (ML) INTRAVENOUS ONCE
Refills: 0 | Status: DISCONTINUED | OUTPATIENT
Start: 2020-01-01 | End: 2020-01-01

## 2020-01-01 RX ORDER — ROCURONIUM BROMIDE 10 MG/ML
8 VIAL (ML) INTRAVENOUS
Qty: 500 | Refills: 0 | Status: DISCONTINUED | OUTPATIENT
Start: 2020-01-01 | End: 2020-01-01

## 2020-01-01 RX ORDER — SODIUM CHLORIDE 9 MG/ML
1000 INJECTION, SOLUTION INTRAVENOUS
Refills: 0 | Status: DISCONTINUED | OUTPATIENT
Start: 2020-01-01 | End: 2020-01-01

## 2020-01-01 RX ORDER — ALBUTEROL 90 UG/1
4 AEROSOL, METERED ORAL ONCE
Refills: 0 | Status: COMPLETED | OUTPATIENT
Start: 2020-01-01 | End: 2020-01-01

## 2020-01-01 RX ORDER — ARGATROBAN 50 MG/50ML
2 INJECTION, SOLUTION INTRAVENOUS
Qty: 250 | Refills: 0 | Status: DISCONTINUED | OUTPATIENT
Start: 2020-01-01 | End: 2020-01-01

## 2020-01-01 RX ORDER — ARGATROBAN 50 MG/50ML
17 INJECTION, SOLUTION INTRAVENOUS
Qty: 250 | Refills: 0 | Status: DISCONTINUED | OUTPATIENT
Start: 2020-01-01 | End: 2020-01-01

## 2020-01-01 RX ORDER — MIDODRINE HYDROCHLORIDE 2.5 MG/1
10 TABLET ORAL EVERY 8 HOURS
Refills: 0 | Status: DISCONTINUED | OUTPATIENT
Start: 2020-01-01 | End: 2020-01-01

## 2020-01-01 RX ORDER — MIDAZOLAM HYDROCHLORIDE 1 MG/ML
0.04 INJECTION, SOLUTION INTRAMUSCULAR; INTRAVENOUS
Qty: 100 | Refills: 0 | Status: DISCONTINUED | OUTPATIENT
Start: 2020-01-01 | End: 2020-01-01

## 2020-01-01 RX ORDER — LEVOTHYROXINE SODIUM 125 MCG
100 TABLET ORAL DAILY
Refills: 0 | Status: DISCONTINUED | OUTPATIENT
Start: 2020-01-01 | End: 2020-01-01

## 2020-01-01 RX ORDER — SODIUM CHLORIDE 9 MG/ML
500 INJECTION, SOLUTION INTRAVENOUS ONCE
Refills: 0 | Status: COMPLETED | OUTPATIENT
Start: 2020-01-01 | End: 2020-01-01

## 2020-01-01 RX ORDER — PANTOPRAZOLE SODIUM 20 MG/1
40 TABLET, DELAYED RELEASE ORAL
Refills: 0 | Status: DISCONTINUED | OUTPATIENT
Start: 2020-01-01 | End: 2020-01-01

## 2020-01-01 RX ORDER — FENTANYL CITRATE 50 UG/ML
2 INJECTION INTRAVENOUS
Qty: 2500 | Refills: 0 | Status: DISCONTINUED | OUTPATIENT
Start: 2020-01-01 | End: 2020-01-01

## 2020-01-01 RX ORDER — VASOPRESSIN 20 [USP'U]/ML
0.04 INJECTION INTRAVENOUS
Qty: 50 | Refills: 0 | Status: DISCONTINUED | OUTPATIENT
Start: 2020-01-01 | End: 2020-01-01

## 2020-01-01 RX ORDER — HYDROXYCHLOROQUINE SULFATE 200 MG
200 TABLET ORAL EVERY 12 HOURS
Refills: 0 | Status: COMPLETED | OUTPATIENT
Start: 2020-01-01 | End: 2020-01-01

## 2020-01-01 RX ORDER — HYDROMORPHONE HYDROCHLORIDE 2 MG/ML
1 INJECTION INTRAMUSCULAR; INTRAVENOUS; SUBCUTANEOUS
Qty: 100 | Refills: 0 | Status: DISCONTINUED | OUTPATIENT
Start: 2020-01-01 | End: 2020-01-01

## 2020-01-01 RX ORDER — MIDAZOLAM HYDROCHLORIDE 1 MG/ML
0.02 INJECTION, SOLUTION INTRAMUSCULAR; INTRAVENOUS
Qty: 100 | Refills: 0 | Status: DISCONTINUED | OUTPATIENT
Start: 2020-01-01 | End: 2020-01-01

## 2020-01-01 RX ORDER — POTASSIUM PHOSPHATE, MONOBASIC POTASSIUM PHOSPHATE, DIBASIC 236; 224 MG/ML; MG/ML
15 INJECTION, SOLUTION INTRAVENOUS ONCE
Refills: 0 | Status: COMPLETED | OUTPATIENT
Start: 2020-01-01 | End: 2020-01-01

## 2020-01-01 RX ORDER — ACETAZOLAMIDE 250 MG/1
250 TABLET ORAL EVERY 8 HOURS
Refills: 0 | Status: COMPLETED | OUTPATIENT
Start: 2020-01-01 | End: 2020-01-01

## 2020-01-01 RX ORDER — CHOLECALCIFEROL (VITAMIN D3) 125 MCG
1000 CAPSULE ORAL DAILY
Refills: 0 | Status: DISCONTINUED | OUTPATIENT
Start: 2020-01-01 | End: 2020-01-01

## 2020-01-01 RX ORDER — DEXTROSE 50 % IN WATER 50 %
12.5 SYRINGE (ML) INTRAVENOUS ONCE
Refills: 0 | Status: DISCONTINUED | OUTPATIENT
Start: 2020-01-01 | End: 2020-01-01

## 2020-01-01 RX ORDER — ENOXAPARIN SODIUM 100 MG/ML
40 INJECTION SUBCUTANEOUS
Refills: 0 | Status: DISCONTINUED | OUTPATIENT
Start: 2020-01-01 | End: 2020-01-01

## 2020-01-01 RX ORDER — CEFTRIAXONE 500 MG/1
1000 INJECTION, POWDER, FOR SOLUTION INTRAMUSCULAR; INTRAVENOUS ONCE
Refills: 0 | Status: COMPLETED | OUTPATIENT
Start: 2020-01-01 | End: 2020-01-01

## 2020-01-01 RX ORDER — AZITHROMYCIN 500 MG/1
250 TABLET, FILM COATED ORAL EVERY 24 HOURS
Refills: 0 | Status: DISCONTINUED | OUTPATIENT
Start: 2020-01-01 | End: 2020-01-01

## 2020-01-01 RX ORDER — MEROPENEM 1 G/30ML
1000 INJECTION INTRAVENOUS EVERY 8 HOURS
Refills: 0 | Status: DISCONTINUED | OUTPATIENT
Start: 2020-01-01 | End: 2020-01-01

## 2020-01-01 RX ORDER — NOREPINEPHRINE BITARTRATE/D5W 8 MG/250ML
0.05 PLASTIC BAG, INJECTION (ML) INTRAVENOUS
Qty: 8 | Refills: 0 | Status: DISCONTINUED | OUTPATIENT
Start: 2020-01-01 | End: 2020-01-01

## 2020-01-01 RX ORDER — CEFTRIAXONE 500 MG/1
2000 INJECTION, POWDER, FOR SOLUTION INTRAMUSCULAR; INTRAVENOUS EVERY 24 HOURS
Refills: 0 | Status: DISCONTINUED | OUTPATIENT
Start: 2020-01-01 | End: 2020-01-01

## 2020-01-01 RX ORDER — VECURONIUM BROMIDE 20 MG/1
10 INJECTION, POWDER, FOR SOLUTION INTRAVENOUS ONCE
Refills: 0 | Status: COMPLETED | OUTPATIENT
Start: 2020-01-01 | End: 2020-01-01

## 2020-01-01 RX ORDER — ROCURONIUM BROMIDE 10 MG/ML
30 VIAL (ML) INTRAVENOUS ONCE
Refills: 0 | Status: COMPLETED | OUTPATIENT
Start: 2020-01-01 | End: 2020-01-01

## 2020-01-01 RX ORDER — DEXAMETHASONE 0.5 MG/5ML
10 ELIXIR ORAL DAILY
Refills: 0 | Status: COMPLETED | OUTPATIENT
Start: 2020-01-01 | End: 2020-01-01

## 2020-01-01 RX ORDER — FUROSEMIDE 40 MG
40 TABLET ORAL ONCE
Refills: 0 | Status: COMPLETED | OUTPATIENT
Start: 2020-01-01 | End: 2020-01-01

## 2020-01-01 RX ORDER — AZITHROMYCIN 500 MG/1
TABLET, FILM COATED ORAL
Refills: 0 | Status: DISCONTINUED | OUTPATIENT
Start: 2020-01-01 | End: 2020-01-01

## 2020-01-01 RX ORDER — LEVOTHYROXINE SODIUM 125 MCG
50 TABLET ORAL ONCE
Refills: 0 | Status: COMPLETED | OUTPATIENT
Start: 2020-01-01 | End: 2020-01-01

## 2020-01-01 RX ORDER — HEPARIN SODIUM 5000 [USP'U]/ML
INJECTION INTRAVENOUS; SUBCUTANEOUS
Qty: 25000 | Refills: 0 | Status: DISCONTINUED | OUTPATIENT
Start: 2020-01-01 | End: 2020-01-01

## 2020-01-01 RX ORDER — TOCILIZUMAB 20 MG/ML
400 INJECTION, SOLUTION, CONCENTRATE INTRAVENOUS ONCE
Refills: 0 | Status: COMPLETED | OUTPATIENT
Start: 2020-01-01 | End: 2020-01-01

## 2020-01-01 RX ORDER — LEVOTHYROXINE SODIUM 125 MCG
100 TABLET ORAL AT BEDTIME
Refills: 0 | Status: DISCONTINUED | OUTPATIENT
Start: 2020-01-01 | End: 2020-01-01

## 2020-01-01 RX ORDER — ASCORBIC ACID 60 MG
1000 TABLET,CHEWABLE ORAL DAILY
Refills: 0 | Status: DISCONTINUED | OUTPATIENT
Start: 2020-01-01 | End: 2020-01-01

## 2020-01-01 RX ORDER — LEVOTHYROXINE SODIUM 125 MCG
50 TABLET ORAL AT BEDTIME
Refills: 0 | Status: DISCONTINUED | OUTPATIENT
Start: 2020-01-01 | End: 2020-01-01

## 2020-01-01 RX ORDER — ACETAMINOPHEN 500 MG
650 TABLET ORAL EVERY 4 HOURS
Refills: 0 | Status: DISCONTINUED | OUTPATIENT
Start: 2020-01-01 | End: 2020-01-01

## 2020-01-01 RX ORDER — HYDROXYCHLOROQUINE SULFATE 200 MG
TABLET ORAL
Refills: 0 | Status: COMPLETED | OUTPATIENT
Start: 2020-01-01 | End: 2020-01-01

## 2020-01-01 RX ORDER — HEPARIN SODIUM 5000 [USP'U]/ML
6500 INJECTION INTRAVENOUS; SUBCUTANEOUS EVERY 6 HOURS
Refills: 0 | Status: DISCONTINUED | OUTPATIENT
Start: 2020-01-01 | End: 2020-01-01

## 2020-01-01 RX ORDER — HYDROXYCHLOROQUINE SULFATE 200 MG
400 TABLET ORAL EVERY 12 HOURS
Refills: 0 | Status: COMPLETED | OUTPATIENT
Start: 2020-01-01 | End: 2020-01-01

## 2020-01-01 RX ORDER — CHLORHEXIDINE GLUCONATE 213 G/1000ML
1 SOLUTION TOPICAL DAILY
Refills: 0 | Status: DISCONTINUED | OUTPATIENT
Start: 2020-01-01 | End: 2020-01-01

## 2020-01-01 RX ORDER — VANCOMYCIN HCL 1 G
1000 VIAL (EA) INTRAVENOUS EVERY 12 HOURS
Refills: 0 | Status: DISCONTINUED | OUTPATIENT
Start: 2020-01-01 | End: 2020-01-01

## 2020-01-01 RX ORDER — PANTOPRAZOLE SODIUM 20 MG/1
40 TABLET, DELAYED RELEASE ORAL DAILY
Refills: 0 | Status: DISCONTINUED | OUTPATIENT
Start: 2020-01-01 | End: 2020-01-01

## 2020-01-01 RX ORDER — ROCURONIUM BROMIDE 10 MG/ML
30 VIAL (ML) INTRAVENOUS ONCE
Refills: 0 | Status: DISCONTINUED | OUTPATIENT
Start: 2020-01-01 | End: 2020-01-01

## 2020-01-01 RX ORDER — FENTANYL CITRATE 50 UG/ML
0.5 INJECTION INTRAVENOUS
Qty: 2500 | Refills: 0 | Status: DISCONTINUED | OUTPATIENT
Start: 2020-01-01 | End: 2020-01-01

## 2020-01-01 RX ORDER — HYDROMORPHONE HYDROCHLORIDE 2 MG/ML
0.5 INJECTION INTRAMUSCULAR; INTRAVENOUS; SUBCUTANEOUS
Qty: 100 | Refills: 0 | Status: DISCONTINUED | OUTPATIENT
Start: 2020-01-01 | End: 2020-01-01

## 2020-01-01 RX ORDER — MORPHINE SULFATE 50 MG/1
1 CAPSULE, EXTENDED RELEASE ORAL ONCE
Refills: 0 | Status: DISCONTINUED | OUTPATIENT
Start: 2020-01-01 | End: 2020-01-01

## 2020-01-01 RX ORDER — AZITHROMYCIN 500 MG/1
500 TABLET, FILM COATED ORAL ONCE
Refills: 0 | Status: COMPLETED | OUTPATIENT
Start: 2020-01-01 | End: 2020-01-01

## 2020-01-01 RX ORDER — DEXMEDETOMIDINE HYDROCHLORIDE IN 0.9% SODIUM CHLORIDE 4 UG/ML
0.7 INJECTION INTRAVENOUS
Qty: 400 | Refills: 0 | Status: DISCONTINUED | OUTPATIENT
Start: 2020-01-01 | End: 2020-01-01

## 2020-01-01 RX ORDER — BACLOFEN 100 %
10 POWDER (GRAM) MISCELLANEOUS EVERY 8 HOURS
Refills: 0 | Status: DISCONTINUED | OUTPATIENT
Start: 2020-01-01 | End: 2020-01-01

## 2020-01-01 RX ORDER — NOREPINEPHRINE BITARTRATE/D5W 8 MG/250ML
0.05 PLASTIC BAG, INJECTION (ML) INTRAVENOUS
Qty: 32 | Refills: 0 | Status: DISCONTINUED | OUTPATIENT
Start: 2020-01-01 | End: 2020-01-01

## 2020-01-01 RX ORDER — PIPERACILLIN AND TAZOBACTAM 4; .5 G/20ML; G/20ML
3.38 INJECTION, POWDER, LYOPHILIZED, FOR SOLUTION INTRAVENOUS EVERY 8 HOURS
Refills: 0 | Status: DISCONTINUED | OUTPATIENT
Start: 2020-01-01 | End: 2020-01-01

## 2020-01-01 RX ORDER — ALBUTEROL 90 UG/1
2 AEROSOL, METERED ORAL EVERY 4 HOURS
Refills: 0 | Status: DISCONTINUED | OUTPATIENT
Start: 2020-01-01 | End: 2020-01-01

## 2020-01-01 RX ORDER — THIAMINE MONONITRATE (VIT B1) 100 MG
200 TABLET ORAL DAILY
Refills: 0 | Status: DISCONTINUED | OUTPATIENT
Start: 2020-01-01 | End: 2020-01-01

## 2020-01-01 RX ADMIN — Medication 100 MILLIGRAM(S): at 05:03

## 2020-01-01 RX ADMIN — Medication 1 DROP(S): at 05:21

## 2020-01-01 RX ADMIN — Medication 1 DROP(S): at 05:40

## 2020-01-01 RX ADMIN — Medication 100 MILLIGRAM(S): at 21:50

## 2020-01-01 RX ADMIN — Medication 1000 MILLIGRAM(S): at 09:27

## 2020-01-01 RX ADMIN — ENOXAPARIN SODIUM 40 MILLIGRAM(S): 100 INJECTION SUBCUTANEOUS at 06:01

## 2020-01-01 RX ADMIN — PANTOPRAZOLE SODIUM 40 MILLIGRAM(S): 20 TABLET, DELAYED RELEASE ORAL at 12:55

## 2020-01-01 RX ADMIN — Medication 100 MILLIGRAM(S): at 11:04

## 2020-01-01 RX ADMIN — PIPERACILLIN AND TAZOBACTAM 25 GRAM(S): 4; .5 INJECTION, POWDER, LYOPHILIZED, FOR SOLUTION INTRAVENOUS at 16:00

## 2020-01-01 RX ADMIN — MIDODRINE HYDROCHLORIDE 15 MILLIGRAM(S): 2.5 TABLET ORAL at 06:12

## 2020-01-01 RX ADMIN — ENOXAPARIN SODIUM 80 MILLIGRAM(S): 100 INJECTION SUBCUTANEOUS at 16:00

## 2020-01-01 RX ADMIN — Medication 1 DROP(S): at 16:37

## 2020-01-01 RX ADMIN — Medication 1000 UNIT(S): at 11:23

## 2020-01-01 RX ADMIN — CHLORHEXIDINE GLUCONATE 15 MILLILITER(S): 213 SOLUTION TOPICAL at 16:37

## 2020-01-01 RX ADMIN — Medication 650 MILLIGRAM(S): at 04:50

## 2020-01-01 RX ADMIN — CHLORHEXIDINE GLUCONATE 1 APPLICATION(S): 213 SOLUTION TOPICAL at 11:39

## 2020-01-01 RX ADMIN — Medication 80 MILLIGRAM(S): at 16:46

## 2020-01-01 RX ADMIN — HEPARIN SODIUM 1000 UNIT(S)/HR: 5000 INJECTION INTRAVENOUS; SUBCUTANEOUS at 19:07

## 2020-01-01 RX ADMIN — Medication 100 MICROGRAM(S): at 04:57

## 2020-01-01 RX ADMIN — ENOXAPARIN SODIUM 80 MILLIGRAM(S): 100 INJECTION SUBCUTANEOUS at 06:15

## 2020-01-01 RX ADMIN — FENTANYL CITRATE 17 MICROGRAM(S)/KG/HR: 50 INJECTION INTRAVENOUS at 23:44

## 2020-01-01 RX ADMIN — PANTOPRAZOLE SODIUM 40 MILLIGRAM(S): 20 TABLET, DELAYED RELEASE ORAL at 04:45

## 2020-01-01 RX ADMIN — MIDODRINE HYDROCHLORIDE 15 MILLIGRAM(S): 2.5 TABLET ORAL at 13:28

## 2020-01-01 RX ADMIN — PANTOPRAZOLE SODIUM 40 MILLIGRAM(S): 20 TABLET, DELAYED RELEASE ORAL at 13:01

## 2020-01-01 RX ADMIN — Medication 10 MILLIGRAM(S): at 13:20

## 2020-01-01 RX ADMIN — Medication 650 MILLIGRAM(S): at 10:31

## 2020-01-01 RX ADMIN — MIDAZOLAM HYDROCHLORIDE 1.7 MG/KG/HR: 1 INJECTION, SOLUTION INTRAMUSCULAR; INTRAVENOUS at 09:06

## 2020-01-01 RX ADMIN — Medication 100 MICROGRAM(S): at 22:43

## 2020-01-01 RX ADMIN — Medication 10 MILLIGRAM(S): at 13:22

## 2020-01-01 RX ADMIN — MEROPENEM 100 MILLIGRAM(S): 1 INJECTION INTRAVENOUS at 20:24

## 2020-01-01 RX ADMIN — MIDODRINE HYDROCHLORIDE 10 MILLIGRAM(S): 2.5 TABLET ORAL at 09:10

## 2020-01-01 RX ADMIN — ENOXAPARIN SODIUM 40 MILLIGRAM(S): 100 INJECTION SUBCUTANEOUS at 17:03

## 2020-01-01 RX ADMIN — ENOXAPARIN SODIUM 80 MILLIGRAM(S): 100 INJECTION SUBCUTANEOUS at 05:21

## 2020-01-01 RX ADMIN — MIDODRINE HYDROCHLORIDE 10 MILLIGRAM(S): 2.5 TABLET ORAL at 16:48

## 2020-01-01 RX ADMIN — Medication 100 MICROGRAM(S): at 23:14

## 2020-01-01 RX ADMIN — SODIUM CHLORIDE 60 MILLILITER(S): 9 INJECTION, SOLUTION INTRAVENOUS at 10:07

## 2020-01-01 RX ADMIN — CHLORHEXIDINE GLUCONATE 15 MILLILITER(S): 213 SOLUTION TOPICAL at 05:25

## 2020-01-01 RX ADMIN — AZITHROMYCIN 255 MILLIGRAM(S): 500 TABLET, FILM COATED ORAL at 05:39

## 2020-01-01 RX ADMIN — Medication 10 MILLIGRAM(S): at 13:02

## 2020-01-01 RX ADMIN — HEPARIN SODIUM 1200 UNIT(S)/HR: 5000 INJECTION INTRAVENOUS; SUBCUTANEOUS at 03:22

## 2020-01-01 RX ADMIN — MIDAZOLAM HYDROCHLORIDE 1.7 MG/KG/HR: 1 INJECTION, SOLUTION INTRAMUSCULAR; INTRAVENOUS at 22:08

## 2020-01-01 RX ADMIN — MIDAZOLAM HYDROCHLORIDE 1.7 MG/KG/HR: 1 INJECTION, SOLUTION INTRAMUSCULAR; INTRAVENOUS at 05:49

## 2020-01-01 RX ADMIN — PROPOFOL 10.2 MICROGRAM(S)/KG/MIN: 10 INJECTION, EMULSION INTRAVENOUS at 06:15

## 2020-01-01 RX ADMIN — CHLORHEXIDINE GLUCONATE 1 APPLICATION(S): 213 SOLUTION TOPICAL at 04:37

## 2020-01-01 RX ADMIN — Medication 100 MILLIGRAM(S): at 00:19

## 2020-01-01 RX ADMIN — CEFTRIAXONE 100 MILLIGRAM(S): 500 INJECTION, POWDER, FOR SOLUTION INTRAMUSCULAR; INTRAVENOUS at 17:58

## 2020-01-01 RX ADMIN — Medication 10 MILLIGRAM(S): at 13:36

## 2020-01-01 RX ADMIN — CHLORHEXIDINE GLUCONATE 1 APPLICATION(S): 213 SOLUTION TOPICAL at 11:25

## 2020-01-01 RX ADMIN — ENOXAPARIN SODIUM 80 MILLIGRAM(S): 100 INJECTION SUBCUTANEOUS at 17:31

## 2020-01-01 RX ADMIN — CHLORHEXIDINE GLUCONATE 15 MILLILITER(S): 213 SOLUTION TOPICAL at 17:36

## 2020-01-01 RX ADMIN — Medication 1 DROP(S): at 18:42

## 2020-01-01 RX ADMIN — Medication 2: at 01:10

## 2020-01-01 RX ADMIN — Medication 1 DROP(S): at 17:37

## 2020-01-01 RX ADMIN — MIDAZOLAM HYDROCHLORIDE 1.7 MG/KG/HR: 1 INJECTION, SOLUTION INTRAMUSCULAR; INTRAVENOUS at 18:57

## 2020-01-01 RX ADMIN — TOCILIZUMAB 120 MILLIGRAM(S): 20 INJECTION, SOLUTION, CONCENTRATE INTRAVENOUS at 16:09

## 2020-01-01 RX ADMIN — Medication 10 MILLIGRAM(S): at 11:33

## 2020-01-01 RX ADMIN — Medication 650 MILLIGRAM(S): at 21:38

## 2020-01-01 RX ADMIN — Medication 50 MILLIGRAM(S): at 10:30

## 2020-01-01 RX ADMIN — Medication 40 MILLIGRAM(S): at 22:48

## 2020-01-01 RX ADMIN — Medication 650 MILLIGRAM(S): at 20:18

## 2020-01-01 RX ADMIN — PANTOPRAZOLE SODIUM 40 MILLIGRAM(S): 20 TABLET, DELAYED RELEASE ORAL at 11:33

## 2020-01-01 RX ADMIN — Medication 50 MICROGRAM(S): at 22:30

## 2020-01-01 RX ADMIN — Medication 650 MILLIGRAM(S): at 15:26

## 2020-01-01 RX ADMIN — Medication 200 MILLIGRAM(S): at 17:31

## 2020-01-01 RX ADMIN — Medication 8.16 MICROGRAM(S)/KG/MIN: at 05:33

## 2020-01-01 RX ADMIN — CHLORHEXIDINE GLUCONATE 15 MILLILITER(S): 213 SOLUTION TOPICAL at 16:00

## 2020-01-01 RX ADMIN — MIDODRINE HYDROCHLORIDE 15 MILLIGRAM(S): 2.5 TABLET ORAL at 13:01

## 2020-01-01 RX ADMIN — MIDODRINE HYDROCHLORIDE 15 MILLIGRAM(S): 2.5 TABLET ORAL at 21:58

## 2020-01-01 RX ADMIN — CHLORHEXIDINE GLUCONATE 15 MILLILITER(S): 213 SOLUTION TOPICAL at 17:20

## 2020-01-01 RX ADMIN — CHLORHEXIDINE GLUCONATE 15 MILLILITER(S): 213 SOLUTION TOPICAL at 15:52

## 2020-01-01 RX ADMIN — Medication 650 MILLIGRAM(S): at 01:08

## 2020-01-01 RX ADMIN — VECURONIUM BROMIDE 10 MILLIGRAM(S): 20 INJECTION, POWDER, FOR SOLUTION INTRAVENOUS at 18:15

## 2020-01-01 RX ADMIN — Medication 80 MILLIGRAM(S): at 21:53

## 2020-01-01 RX ADMIN — MIDODRINE HYDROCHLORIDE 15 MILLIGRAM(S): 2.5 TABLET ORAL at 02:44

## 2020-01-01 RX ADMIN — ARGATROBAN 10.2 MICROGRAM(S)/KG/MIN: 50 INJECTION, SOLUTION INTRAVENOUS at 05:48

## 2020-01-01 RX ADMIN — CHLORHEXIDINE GLUCONATE 1 APPLICATION(S): 213 SOLUTION TOPICAL at 13:22

## 2020-01-01 RX ADMIN — MORPHINE SULFATE 2 MILLIGRAM(S): 50 CAPSULE, EXTENDED RELEASE ORAL at 06:16

## 2020-01-01 RX ADMIN — Medication 650 MILLIGRAM(S): at 05:49

## 2020-01-01 RX ADMIN — ENOXAPARIN SODIUM 40 MILLIGRAM(S): 100 INJECTION SUBCUTANEOUS at 16:46

## 2020-01-01 RX ADMIN — CHLORHEXIDINE GLUCONATE 15 MILLILITER(S): 213 SOLUTION TOPICAL at 18:42

## 2020-01-01 RX ADMIN — Medication 100 MICROGRAM(S): at 21:57

## 2020-01-01 RX ADMIN — Medication 1 DROP(S): at 06:12

## 2020-01-01 RX ADMIN — MEROPENEM 100 MILLIGRAM(S): 1 INJECTION INTRAVENOUS at 13:20

## 2020-01-01 RX ADMIN — Medication 50 MICROGRAM(S): at 22:48

## 2020-01-01 RX ADMIN — PANTOPRAZOLE SODIUM 40 MILLIGRAM(S): 20 TABLET, DELAYED RELEASE ORAL at 10:17

## 2020-01-01 RX ADMIN — ACETAZOLAMIDE 105 MILLIGRAM(S): 250 TABLET ORAL at 16:27

## 2020-01-01 RX ADMIN — FENTANYL CITRATE 200 MICROGRAM(S): 50 INJECTION INTRAVENOUS at 13:04

## 2020-01-01 RX ADMIN — Medication 100 MILLIGRAM(S): at 06:01

## 2020-01-01 RX ADMIN — ENOXAPARIN SODIUM 80 MILLIGRAM(S): 100 INJECTION SUBCUTANEOUS at 06:12

## 2020-01-01 RX ADMIN — CHLORHEXIDINE GLUCONATE 1 APPLICATION(S): 213 SOLUTION TOPICAL at 10:16

## 2020-01-01 RX ADMIN — Medication 2: at 17:37

## 2020-01-01 RX ADMIN — MIDAZOLAM HYDROCHLORIDE 1.7 MG/KG/HR: 1 INJECTION, SOLUTION INTRAMUSCULAR; INTRAVENOUS at 06:05

## 2020-01-01 RX ADMIN — CHLORHEXIDINE GLUCONATE 1 APPLICATION(S): 213 SOLUTION TOPICAL at 13:21

## 2020-01-01 RX ADMIN — CHLORHEXIDINE GLUCONATE 1 APPLICATION(S): 213 SOLUTION TOPICAL at 11:35

## 2020-01-01 RX ADMIN — Medication 100 MILLIGRAM(S): at 04:49

## 2020-01-01 RX ADMIN — Medication 1000 MILLIGRAM(S): at 11:04

## 2020-01-01 RX ADMIN — CHLORHEXIDINE GLUCONATE 15 MILLILITER(S): 213 SOLUTION TOPICAL at 16:39

## 2020-01-01 RX ADMIN — Medication 1 DROP(S): at 15:52

## 2020-01-01 RX ADMIN — Medication 200 MILLIGRAM(S): at 11:04

## 2020-01-01 RX ADMIN — Medication 200 MILLIGRAM(S): at 06:01

## 2020-01-01 RX ADMIN — CHLORHEXIDINE GLUCONATE 15 MILLILITER(S): 213 SOLUTION TOPICAL at 06:50

## 2020-01-01 RX ADMIN — ENOXAPARIN SODIUM 40 MILLIGRAM(S): 100 INJECTION SUBCUTANEOUS at 06:18

## 2020-01-01 RX ADMIN — PANTOPRAZOLE SODIUM 40 MILLIGRAM(S): 20 TABLET, DELAYED RELEASE ORAL at 12:25

## 2020-01-01 RX ADMIN — Medication 10 MILLIGRAM(S): at 12:15

## 2020-01-01 RX ADMIN — HEPARIN SODIUM 1000 UNIT(S)/HR: 5000 INJECTION INTRAVENOUS; SUBCUTANEOUS at 11:55

## 2020-01-01 RX ADMIN — Medication 650 MILLIGRAM(S): at 06:12

## 2020-01-01 RX ADMIN — Medication 100 MICROGRAM(S): at 21:01

## 2020-01-01 RX ADMIN — PANTOPRAZOLE SODIUM 40 MILLIGRAM(S): 20 TABLET, DELAYED RELEASE ORAL at 05:04

## 2020-01-01 RX ADMIN — MEROPENEM 100 MILLIGRAM(S): 1 INJECTION INTRAVENOUS at 05:26

## 2020-01-01 RX ADMIN — FENTANYL CITRATE 17 MICROGRAM(S)/KG/HR: 50 INJECTION INTRAVENOUS at 14:20

## 2020-01-01 RX ADMIN — PROPOFOL 10.2 MICROGRAM(S)/KG/MIN: 10 INJECTION, EMULSION INTRAVENOUS at 14:20

## 2020-01-01 RX ADMIN — MIDODRINE HYDROCHLORIDE 15 MILLIGRAM(S): 2.5 TABLET ORAL at 11:33

## 2020-01-01 RX ADMIN — Medication 200 MILLIGRAM(S): at 11:23

## 2020-01-01 RX ADMIN — Medication 650 MILLIGRAM(S): at 18:38

## 2020-01-01 RX ADMIN — Medication 10 MILLIGRAM(S): at 09:10

## 2020-01-01 RX ADMIN — Medication 1 DROP(S): at 17:36

## 2020-01-01 RX ADMIN — Medication 50 MILLIGRAM(S): at 21:10

## 2020-01-01 RX ADMIN — PANTOPRAZOLE SODIUM 40 MILLIGRAM(S): 20 TABLET, DELAYED RELEASE ORAL at 10:15

## 2020-01-01 RX ADMIN — ENOXAPARIN SODIUM 40 MILLIGRAM(S): 100 INJECTION SUBCUTANEOUS at 04:56

## 2020-01-01 RX ADMIN — Medication 3.98 MICROGRAM(S)/KG/MIN: at 16:35

## 2020-01-01 RX ADMIN — CHLORHEXIDINE GLUCONATE 15 MILLILITER(S): 213 SOLUTION TOPICAL at 05:08

## 2020-01-01 RX ADMIN — MIDODRINE HYDROCHLORIDE 10 MILLIGRAM(S): 2.5 TABLET ORAL at 16:00

## 2020-01-01 RX ADMIN — PROPOFOL 10.2 MICROGRAM(S)/KG/MIN: 10 INJECTION, EMULSION INTRAVENOUS at 04:35

## 2020-01-01 RX ADMIN — PANTOPRAZOLE SODIUM 40 MILLIGRAM(S): 20 TABLET, DELAYED RELEASE ORAL at 12:21

## 2020-01-01 RX ADMIN — MEROPENEM 100 MILLIGRAM(S): 1 INJECTION INTRAVENOUS at 04:42

## 2020-01-01 RX ADMIN — MIDODRINE HYDROCHLORIDE 15 MILLIGRAM(S): 2.5 TABLET ORAL at 05:26

## 2020-01-01 RX ADMIN — ENOXAPARIN SODIUM 40 MILLIGRAM(S): 100 INJECTION SUBCUTANEOUS at 17:31

## 2020-01-01 RX ADMIN — MIDAZOLAM HYDROCHLORIDE 3 MG/KG/HR: 1 INJECTION, SOLUTION INTRAMUSCULAR; INTRAVENOUS at 14:20

## 2020-01-01 RX ADMIN — ENOXAPARIN SODIUM 80 MILLIGRAM(S): 100 INJECTION SUBCUTANEOUS at 05:04

## 2020-01-01 RX ADMIN — Medication 10 MILLIGRAM(S): at 11:03

## 2020-01-01 RX ADMIN — Medication 10 MILLIGRAM(S): at 12:56

## 2020-01-01 RX ADMIN — PANTOPRAZOLE SODIUM 40 MILLIGRAM(S): 20 TABLET, DELAYED RELEASE ORAL at 13:21

## 2020-01-01 RX ADMIN — Medication 50 MICROGRAM(S): at 21:23

## 2020-01-01 RX ADMIN — CHLORHEXIDINE GLUCONATE 15 MILLILITER(S): 213 SOLUTION TOPICAL at 05:40

## 2020-01-01 RX ADMIN — Medication 1 DROP(S): at 05:10

## 2020-01-01 RX ADMIN — CHLORHEXIDINE GLUCONATE 15 MILLILITER(S): 213 SOLUTION TOPICAL at 18:04

## 2020-01-01 RX ADMIN — Medication 80 MILLIGRAM(S): at 13:00

## 2020-01-01 RX ADMIN — AZITHROMYCIN 250 MILLIGRAM(S): 500 TABLET, FILM COATED ORAL at 04:56

## 2020-01-01 RX ADMIN — Medication 1 DROP(S): at 05:56

## 2020-01-01 RX ADMIN — ENOXAPARIN SODIUM 80 MILLIGRAM(S): 100 INJECTION SUBCUTANEOUS at 17:19

## 2020-01-01 RX ADMIN — Medication 200 MILLIGRAM(S): at 17:30

## 2020-01-01 RX ADMIN — Medication 85 MILLIMOLE(S): at 09:40

## 2020-01-01 RX ADMIN — Medication 10 MILLIGRAM(S): at 17:20

## 2020-01-01 RX ADMIN — Medication 8.16 MICROGRAM(S)/KG/MIN: at 19:12

## 2020-01-01 RX ADMIN — MIDODRINE HYDROCHLORIDE 15 MILLIGRAM(S): 2.5 TABLET ORAL at 13:03

## 2020-01-01 RX ADMIN — CHLORHEXIDINE GLUCONATE 15 MILLILITER(S): 213 SOLUTION TOPICAL at 05:30

## 2020-01-01 RX ADMIN — PIPERACILLIN AND TAZOBACTAM 25 GRAM(S): 4; .5 INJECTION, POWDER, LYOPHILIZED, FOR SOLUTION INTRAVENOUS at 13:14

## 2020-01-01 RX ADMIN — CHLORHEXIDINE GLUCONATE 15 MILLILITER(S): 213 SOLUTION TOPICAL at 16:28

## 2020-01-01 RX ADMIN — PIPERACILLIN AND TAZOBACTAM 200 GRAM(S): 4; .5 INJECTION, POWDER, LYOPHILIZED, FOR SOLUTION INTRAVENOUS at 12:20

## 2020-01-01 RX ADMIN — CHLORHEXIDINE GLUCONATE 15 MILLILITER(S): 213 SOLUTION TOPICAL at 16:14

## 2020-01-01 RX ADMIN — Medication 650 MILLIGRAM(S): at 11:58

## 2020-01-01 RX ADMIN — MIDAZOLAM HYDROCHLORIDE 3 MG/KG/HR: 1 INJECTION, SOLUTION INTRAMUSCULAR; INTRAVENOUS at 08:14

## 2020-01-01 RX ADMIN — PANTOPRAZOLE SODIUM 40 MILLIGRAM(S): 20 TABLET, DELAYED RELEASE ORAL at 12:15

## 2020-01-01 RX ADMIN — Medication 200 MILLIGRAM(S): at 17:03

## 2020-01-01 RX ADMIN — MIDODRINE HYDROCHLORIDE 15 MILLIGRAM(S): 2.5 TABLET ORAL at 22:09

## 2020-01-01 RX ADMIN — Medication 1 DROP(S): at 16:54

## 2020-01-01 RX ADMIN — CHLORHEXIDINE GLUCONATE 15 MILLILITER(S): 213 SOLUTION TOPICAL at 04:35

## 2020-01-01 RX ADMIN — MIDODRINE HYDROCHLORIDE 15 MILLIGRAM(S): 2.5 TABLET ORAL at 21:29

## 2020-01-01 RX ADMIN — Medication 400 MILLIGRAM(S): at 03:45

## 2020-01-01 RX ADMIN — CHLORHEXIDINE GLUCONATE 15 MILLILITER(S): 213 SOLUTION TOPICAL at 17:31

## 2020-01-01 RX ADMIN — Medication 7.97 MICROGRAM(S)/KG/MIN: at 18:05

## 2020-01-01 RX ADMIN — Medication 10 MILLIGRAM(S): at 12:20

## 2020-01-01 RX ADMIN — PANTOPRAZOLE SODIUM 40 MILLIGRAM(S): 20 TABLET, DELAYED RELEASE ORAL at 12:07

## 2020-01-01 RX ADMIN — Medication 1 DROP(S): at 06:50

## 2020-01-01 RX ADMIN — Medication 10 MILLIGRAM(S): at 12:39

## 2020-01-01 RX ADMIN — MIDAZOLAM HYDROCHLORIDE 3 MG/KG/HR: 1 INJECTION, SOLUTION INTRAMUSCULAR; INTRAVENOUS at 04:46

## 2020-01-01 RX ADMIN — ARGATROBAN 10.2 MICROGRAM(S)/KG/MIN: 50 INJECTION, SOLUTION INTRAVENOUS at 06:05

## 2020-01-01 RX ADMIN — Medication 50 MICROGRAM(S): at 21:51

## 2020-01-01 RX ADMIN — Medication 650 MILLIGRAM(S): at 22:56

## 2020-01-01 RX ADMIN — Medication 650 MILLIGRAM(S): at 16:54

## 2020-01-01 RX ADMIN — PIPERACILLIN AND TAZOBACTAM 25 GRAM(S): 4; .5 INJECTION, POWDER, LYOPHILIZED, FOR SOLUTION INTRAVENOUS at 21:23

## 2020-01-01 RX ADMIN — MIDAZOLAM HYDROCHLORIDE 3 MG/KG/HR: 1 INJECTION, SOLUTION INTRAMUSCULAR; INTRAVENOUS at 16:20

## 2020-01-01 RX ADMIN — Medication 10 MILLIGRAM(S): at 12:08

## 2020-01-01 RX ADMIN — CHLORHEXIDINE GLUCONATE 15 MILLILITER(S): 213 SOLUTION TOPICAL at 05:56

## 2020-01-01 RX ADMIN — Medication 1 DROP(S): at 05:04

## 2020-01-01 RX ADMIN — ENOXAPARIN SODIUM 40 MILLIGRAM(S): 100 INJECTION SUBCUTANEOUS at 04:45

## 2020-01-01 RX ADMIN — Medication 10 MILLIGRAM(S): at 05:09

## 2020-01-01 RX ADMIN — CHLORHEXIDINE GLUCONATE 1 APPLICATION(S): 213 SOLUTION TOPICAL at 11:47

## 2020-01-01 RX ADMIN — MIDODRINE HYDROCHLORIDE 15 MILLIGRAM(S): 2.5 TABLET ORAL at 05:41

## 2020-01-01 RX ADMIN — Medication 1000 MILLIGRAM(S): at 11:10

## 2020-01-01 RX ADMIN — Medication 100 MICROGRAM(S): at 06:01

## 2020-01-01 RX ADMIN — Medication 80 MILLIGRAM(S): at 04:22

## 2020-01-01 RX ADMIN — PANTOPRAZOLE SODIUM 40 MILLIGRAM(S): 20 TABLET, DELAYED RELEASE ORAL at 04:22

## 2020-01-01 RX ADMIN — Medication 1 DROP(S): at 00:12

## 2020-01-01 RX ADMIN — Medication 1 DROP(S): at 04:27

## 2020-01-01 RX ADMIN — Medication 100 MICROGRAM(S): at 04:45

## 2020-01-01 RX ADMIN — ENOXAPARIN SODIUM 80 MILLIGRAM(S): 100 INJECTION SUBCUTANEOUS at 17:15

## 2020-01-01 RX ADMIN — MIDAZOLAM HYDROCHLORIDE 2 MILLIGRAM(S): 1 INJECTION, SOLUTION INTRAMUSCULAR; INTRAVENOUS at 09:44

## 2020-01-01 RX ADMIN — Medication 1 DROP(S): at 05:55

## 2020-01-01 RX ADMIN — CHLORHEXIDINE GLUCONATE 15 MILLILITER(S): 213 SOLUTION TOPICAL at 16:54

## 2020-01-01 RX ADMIN — Medication 650 MILLIGRAM(S): at 13:32

## 2020-01-01 RX ADMIN — ENOXAPARIN SODIUM 80 MILLIGRAM(S): 100 INJECTION SUBCUTANEOUS at 17:37

## 2020-01-01 RX ADMIN — DEXMEDETOMIDINE HYDROCHLORIDE IN 0.9% SODIUM CHLORIDE 14.9 MICROGRAM(S)/KG/HR: 4 INJECTION INTRAVENOUS at 10:31

## 2020-01-01 RX ADMIN — HEPARIN SODIUM 0 UNIT(S)/HR: 5000 INJECTION INTRAVENOUS; SUBCUTANEOUS at 02:40

## 2020-01-01 RX ADMIN — ENOXAPARIN SODIUM 80 MILLIGRAM(S): 100 INJECTION SUBCUTANEOUS at 04:42

## 2020-01-01 RX ADMIN — Medication 200 MILLIGRAM(S): at 04:45

## 2020-01-01 RX ADMIN — CHLORHEXIDINE GLUCONATE 1 APPLICATION(S): 213 SOLUTION TOPICAL at 12:47

## 2020-01-01 RX ADMIN — Medication 250 MILLIGRAM(S): at 05:26

## 2020-01-01 RX ADMIN — HYDROMORPHONE HYDROCHLORIDE 1 MG/HR: 2 INJECTION INTRAMUSCULAR; INTRAVENOUS; SUBCUTANEOUS at 09:59

## 2020-01-01 RX ADMIN — Medication 100 MICROGRAM(S): at 04:22

## 2020-01-01 RX ADMIN — ENOXAPARIN SODIUM 80 MILLIGRAM(S): 100 INJECTION SUBCUTANEOUS at 18:42

## 2020-01-01 RX ADMIN — PANTOPRAZOLE SODIUM 40 MILLIGRAM(S): 20 TABLET, DELAYED RELEASE ORAL at 13:32

## 2020-01-01 RX ADMIN — Medication 1 DROP(S): at 17:28

## 2020-01-01 RX ADMIN — ENOXAPARIN SODIUM 40 MILLIGRAM(S): 100 INJECTION SUBCUTANEOUS at 16:50

## 2020-01-01 RX ADMIN — Medication 2: at 23:26

## 2020-01-01 RX ADMIN — Medication 100 MILLIGRAM(S): at 04:44

## 2020-01-01 RX ADMIN — CHLORHEXIDINE GLUCONATE 15 MILLILITER(S): 213 SOLUTION TOPICAL at 17:37

## 2020-01-01 RX ADMIN — HYDROMORPHONE HYDROCHLORIDE 0.5 MG/HR: 2 INJECTION INTRAMUSCULAR; INTRAVENOUS; SUBCUTANEOUS at 07:00

## 2020-01-01 RX ADMIN — CHLORHEXIDINE GLUCONATE 15 MILLILITER(S): 213 SOLUTION TOPICAL at 06:12

## 2020-01-01 RX ADMIN — MIDODRINE HYDROCHLORIDE 15 MILLIGRAM(S): 2.5 TABLET ORAL at 05:56

## 2020-01-01 RX ADMIN — ENOXAPARIN SODIUM 80 MILLIGRAM(S): 100 INJECTION SUBCUTANEOUS at 05:56

## 2020-01-01 RX ADMIN — Medication 8.16 MICROGRAM(S)/KG/MIN: at 21:02

## 2020-01-01 RX ADMIN — Medication 1 DROP(S): at 05:25

## 2020-01-01 RX ADMIN — POTASSIUM PHOSPHATE, MONOBASIC POTASSIUM PHOSPHATE, DIBASIC 62.5 MILLIMOLE(S): 236; 224 INJECTION, SOLUTION INTRAVENOUS at 10:07

## 2020-01-01 RX ADMIN — MIDODRINE HYDROCHLORIDE 15 MILLIGRAM(S): 2.5 TABLET ORAL at 13:36

## 2020-01-01 RX ADMIN — Medication 1 DROP(S): at 04:49

## 2020-01-01 RX ADMIN — ENOXAPARIN SODIUM 40 MILLIGRAM(S): 100 INJECTION SUBCUTANEOUS at 18:43

## 2020-01-01 RX ADMIN — Medication 650 MILLIGRAM(S): at 00:21

## 2020-01-01 RX ADMIN — CHLORHEXIDINE GLUCONATE 15 MILLILITER(S): 213 SOLUTION TOPICAL at 05:57

## 2020-01-01 RX ADMIN — PANTOPRAZOLE SODIUM 40 MILLIGRAM(S): 20 TABLET, DELAYED RELEASE ORAL at 11:48

## 2020-01-01 RX ADMIN — MIDODRINE HYDROCHLORIDE 15 MILLIGRAM(S): 2.5 TABLET ORAL at 12:24

## 2020-01-01 RX ADMIN — Medication 50 MICROGRAM(S): at 23:25

## 2020-01-01 RX ADMIN — Medication 80 MILLIGRAM(S): at 20:17

## 2020-01-01 RX ADMIN — Medication 1000 UNIT(S): at 17:31

## 2020-01-01 RX ADMIN — Medication 200 MILLIGRAM(S): at 04:21

## 2020-01-01 RX ADMIN — Medication 650 MILLIGRAM(S): at 06:08

## 2020-01-01 RX ADMIN — Medication 50 MICROGRAM(S): at 21:46

## 2020-01-01 RX ADMIN — ENOXAPARIN SODIUM 80 MILLIGRAM(S): 100 INJECTION SUBCUTANEOUS at 05:26

## 2020-01-01 RX ADMIN — Medication 250 MILLIGRAM(S): at 15:47

## 2020-01-01 RX ADMIN — MIDAZOLAM HYDROCHLORIDE 1.7 MG/KG/HR: 1 INJECTION, SOLUTION INTRAMUSCULAR; INTRAVENOUS at 06:43

## 2020-01-01 RX ADMIN — MIDODRINE HYDROCHLORIDE 10 MILLIGRAM(S): 2.5 TABLET ORAL at 01:38

## 2020-01-01 RX ADMIN — ENOXAPARIN SODIUM 80 MILLIGRAM(S): 100 INJECTION SUBCUTANEOUS at 16:14

## 2020-01-01 RX ADMIN — MIDODRINE HYDROCHLORIDE 15 MILLIGRAM(S): 2.5 TABLET ORAL at 06:23

## 2020-01-01 RX ADMIN — Medication 1000 MILLIGRAM(S): at 17:31

## 2020-01-01 RX ADMIN — Medication 100 MILLIGRAM(S): at 04:21

## 2020-01-01 RX ADMIN — CHLORHEXIDINE GLUCONATE 1 APPLICATION(S): 213 SOLUTION TOPICAL at 11:23

## 2020-01-01 RX ADMIN — Medication 1 DROP(S): at 18:04

## 2020-01-01 RX ADMIN — ENOXAPARIN SODIUM 80 MILLIGRAM(S): 100 INJECTION SUBCUTANEOUS at 16:08

## 2020-01-01 RX ADMIN — MORPHINE SULFATE 1 MILLIGRAM(S): 50 CAPSULE, EXTENDED RELEASE ORAL at 03:31

## 2020-01-01 RX ADMIN — ENOXAPARIN SODIUM 80 MILLIGRAM(S): 100 INJECTION SUBCUTANEOUS at 16:28

## 2020-01-01 RX ADMIN — CHLORHEXIDINE GLUCONATE 1 APPLICATION(S): 213 SOLUTION TOPICAL at 11:07

## 2020-01-01 RX ADMIN — Medication 40 MILLIGRAM(S): at 06:56

## 2020-01-01 RX ADMIN — Medication 100 MICROGRAM(S): at 05:03

## 2020-01-01 RX ADMIN — Medication 30 MILLIGRAM(S): at 17:10

## 2020-01-01 RX ADMIN — Medication 100 MILLIGRAM(S): at 20:04

## 2020-01-01 RX ADMIN — CHLORHEXIDINE GLUCONATE 15 MILLILITER(S): 213 SOLUTION TOPICAL at 16:06

## 2020-01-01 RX ADMIN — Medication 50 MILLIGRAM(S): at 18:40

## 2020-01-01 RX ADMIN — FENTANYL CITRATE 100 MICROGRAM(S): 50 INJECTION INTRAVENOUS at 08:57

## 2020-01-01 RX ADMIN — Medication 10 MILLIGRAM(S): at 11:07

## 2020-01-01 RX ADMIN — Medication 40 MILLIGRAM(S): at 05:54

## 2020-01-01 RX ADMIN — ENOXAPARIN SODIUM 80 MILLIGRAM(S): 100 INJECTION SUBCUTANEOUS at 16:48

## 2020-01-01 RX ADMIN — ENOXAPARIN SODIUM 40 MILLIGRAM(S): 100 INJECTION SUBCUTANEOUS at 05:03

## 2020-01-01 RX ADMIN — ARGATROBAN 10.2 MICROGRAM(S)/KG/MIN: 50 INJECTION, SOLUTION INTRAVENOUS at 06:26

## 2020-01-01 RX ADMIN — Medication 400 MILLIGRAM(S): at 18:31

## 2020-01-01 RX ADMIN — MIDODRINE HYDROCHLORIDE 15 MILLIGRAM(S): 2.5 TABLET ORAL at 21:37

## 2020-01-01 RX ADMIN — MIDODRINE HYDROCHLORIDE 15 MILLIGRAM(S): 2.5 TABLET ORAL at 13:22

## 2020-01-01 RX ADMIN — ENOXAPARIN SODIUM 40 MILLIGRAM(S): 100 INJECTION SUBCUTANEOUS at 04:22

## 2020-01-01 RX ADMIN — Medication 650 MILLIGRAM(S): at 09:42

## 2020-01-01 RX ADMIN — CHLORHEXIDINE GLUCONATE 1 APPLICATION(S): 213 SOLUTION TOPICAL at 10:17

## 2020-01-01 RX ADMIN — Medication 10 MILLIGRAM(S): at 15:30

## 2020-01-01 RX ADMIN — Medication 0: at 13:21

## 2020-01-01 RX ADMIN — CHLORHEXIDINE GLUCONATE 15 MILLILITER(S): 213 SOLUTION TOPICAL at 05:55

## 2020-01-01 RX ADMIN — Medication 10 MILLIGRAM(S): at 09:27

## 2020-01-01 RX ADMIN — AZITHROMYCIN 250 MILLIGRAM(S): 500 TABLET, FILM COATED ORAL at 02:04

## 2020-01-01 RX ADMIN — Medication 100 MILLIGRAM(S): at 17:31

## 2020-01-01 RX ADMIN — Medication 80 MILLIGRAM(S): at 16:50

## 2020-01-01 RX ADMIN — Medication 50 MICROGRAM(S): at 21:28

## 2020-01-01 RX ADMIN — ARGATROBAN 10.2 MICROGRAM(S)/KG/MIN: 50 INJECTION, SOLUTION INTRAVENOUS at 09:55

## 2020-01-01 RX ADMIN — Medication 100 MICROGRAM(S): at 05:39

## 2020-01-01 RX ADMIN — Medication 62.5 MILLIMOLE(S): at 13:03

## 2020-01-01 RX ADMIN — CHLORHEXIDINE GLUCONATE 15 MILLILITER(S): 213 SOLUTION TOPICAL at 05:10

## 2020-01-01 RX ADMIN — MIDODRINE HYDROCHLORIDE 15 MILLIGRAM(S): 2.5 TABLET ORAL at 05:10

## 2020-01-01 RX ADMIN — Medication 200 MILLIGRAM(S): at 16:09

## 2020-01-01 RX ADMIN — Medication 200 MILLIGRAM(S): at 14:31

## 2020-01-01 RX ADMIN — Medication 100 MICROGRAM(S): at 22:08

## 2020-01-01 RX ADMIN — Medication 1000 UNIT(S): at 11:10

## 2020-01-01 RX ADMIN — SODIUM CHLORIDE 100 MILLILITER(S): 9 INJECTION, SOLUTION INTRAVENOUS at 04:12

## 2020-01-01 RX ADMIN — MIDODRINE HYDROCHLORIDE 15 MILLIGRAM(S): 2.5 TABLET ORAL at 04:42

## 2020-01-01 RX ADMIN — Medication 10 MILLIGRAM(S): at 11:57

## 2020-01-01 RX ADMIN — Medication 80 MILLIGRAM(S): at 11:04

## 2020-01-01 RX ADMIN — Medication 650 MILLIGRAM(S): at 20:32

## 2020-01-01 RX ADMIN — MIDAZOLAM HYDROCHLORIDE 1.7 MG/KG/HR: 1 INJECTION, SOLUTION INTRAMUSCULAR; INTRAVENOUS at 17:51

## 2020-01-01 RX ADMIN — Medication 100 MILLIGRAM(S): at 11:23

## 2020-01-01 RX ADMIN — HEPARIN SODIUM 1500 UNIT(S)/HR: 5000 INJECTION INTRAVENOUS; SUBCUTANEOUS at 18:24

## 2020-01-01 RX ADMIN — Medication 1 TABLET(S): at 16:50

## 2020-01-01 RX ADMIN — Medication 2: at 00:17

## 2020-01-01 RX ADMIN — CHLORHEXIDINE GLUCONATE 1 APPLICATION(S): 213 SOLUTION TOPICAL at 11:41

## 2020-01-01 RX ADMIN — Medication 50 MICROGRAM(S): at 21:32

## 2020-01-01 RX ADMIN — MIDAZOLAM HYDROCHLORIDE 1.7 MG/KG/HR: 1 INJECTION, SOLUTION INTRAMUSCULAR; INTRAVENOUS at 17:21

## 2020-01-01 RX ADMIN — MIDAZOLAM HYDROCHLORIDE 1.7 MG/KG/HR: 1 INJECTION, SOLUTION INTRAMUSCULAR; INTRAVENOUS at 20:22

## 2020-01-01 RX ADMIN — Medication 8.16 MICROGRAM(S)/KG/MIN: at 04:15

## 2020-01-01 RX ADMIN — MIDODRINE HYDROCHLORIDE 15 MILLIGRAM(S): 2.5 TABLET ORAL at 12:55

## 2020-01-01 RX ADMIN — Medication 8.16 MICROGRAM(S)/KG/MIN: at 05:00

## 2020-01-01 RX ADMIN — CHLORHEXIDINE GLUCONATE 15 MILLILITER(S): 213 SOLUTION TOPICAL at 06:22

## 2020-01-01 RX ADMIN — Medication 100 MILLIGRAM(S): at 15:30

## 2020-01-01 RX ADMIN — Medication 10 MILLIGRAM(S): at 14:31

## 2020-01-01 RX ADMIN — ENOXAPARIN SODIUM 40 MILLIGRAM(S): 100 INJECTION SUBCUTANEOUS at 17:39

## 2020-01-01 RX ADMIN — MIDAZOLAM HYDROCHLORIDE 1.7 MG/KG/HR: 1 INJECTION, SOLUTION INTRAMUSCULAR; INTRAVENOUS at 00:02

## 2020-01-01 RX ADMIN — MIDODRINE HYDROCHLORIDE 10 MILLIGRAM(S): 2.5 TABLET ORAL at 09:03

## 2020-01-01 RX ADMIN — Medication 650 MILLIGRAM(S): at 15:52

## 2020-01-01 RX ADMIN — Medication 100 MILLIGRAM(S): at 22:00

## 2020-01-01 RX ADMIN — Medication 100 MILLIGRAM(S): at 17:00

## 2020-01-01 RX ADMIN — Medication 200 MILLIGRAM(S): at 17:39

## 2020-01-01 RX ADMIN — Medication 10 MILLIGRAM(S): at 11:23

## 2020-01-01 RX ADMIN — MIDAZOLAM HYDROCHLORIDE 1.7 MG/KG/HR: 1 INJECTION, SOLUTION INTRAMUSCULAR; INTRAVENOUS at 06:23

## 2020-01-01 RX ADMIN — Medication 200 MILLIGRAM(S): at 09:27

## 2020-01-01 RX ADMIN — HYDROMORPHONE HYDROCHLORIDE 0.5 MG/HR: 2 INJECTION INTRAMUSCULAR; INTRAVENOUS; SUBCUTANEOUS at 07:01

## 2020-01-01 RX ADMIN — PIPERACILLIN AND TAZOBACTAM 25 GRAM(S): 4; .5 INJECTION, POWDER, LYOPHILIZED, FOR SOLUTION INTRAVENOUS at 06:51

## 2020-01-01 RX ADMIN — Medication 10 MILLIGRAM(S): at 12:25

## 2020-01-01 RX ADMIN — PANTOPRAZOLE SODIUM 40 MILLIGRAM(S): 20 TABLET, DELAYED RELEASE ORAL at 11:07

## 2020-01-01 RX ADMIN — CHLORHEXIDINE GLUCONATE 15 MILLILITER(S): 213 SOLUTION TOPICAL at 17:19

## 2020-01-01 RX ADMIN — Medication 650 MILLIGRAM(S): at 05:42

## 2020-01-01 RX ADMIN — Medication 80 MILLIGRAM(S): at 05:04

## 2020-01-01 RX ADMIN — Medication 10 MILLIGRAM(S): at 17:31

## 2020-01-01 RX ADMIN — MIDODRINE HYDROCHLORIDE 15 MILLIGRAM(S): 2.5 TABLET ORAL at 05:30

## 2020-01-01 RX ADMIN — MIDAZOLAM HYDROCHLORIDE 1.7 MG/KG/HR: 1 INJECTION, SOLUTION INTRAMUSCULAR; INTRAVENOUS at 15:16

## 2020-01-01 RX ADMIN — MIDAZOLAM HYDROCHLORIDE 1.7 MG/KG/HR: 1 INJECTION, SOLUTION INTRAMUSCULAR; INTRAVENOUS at 04:27

## 2020-01-01 RX ADMIN — MEROPENEM 100 MILLIGRAM(S): 1 INJECTION INTRAVENOUS at 21:58

## 2020-01-01 RX ADMIN — HYDROMORPHONE HYDROCHLORIDE 1 MG/HR: 2 INJECTION INTRAMUSCULAR; INTRAVENOUS; SUBCUTANEOUS at 15:15

## 2020-01-01 RX ADMIN — CHLORHEXIDINE GLUCONATE 15 MILLILITER(S): 213 SOLUTION TOPICAL at 17:29

## 2020-01-01 RX ADMIN — CHLORHEXIDINE GLUCONATE 15 MILLILITER(S): 213 SOLUTION TOPICAL at 05:04

## 2020-01-01 RX ADMIN — Medication 50 MICROGRAM(S): at 21:50

## 2020-01-01 RX ADMIN — Medication 2: at 12:25

## 2020-01-01 RX ADMIN — MIDODRINE HYDROCHLORIDE 15 MILLIGRAM(S): 2.5 TABLET ORAL at 06:07

## 2020-01-01 RX ADMIN — ENOXAPARIN SODIUM 80 MILLIGRAM(S): 100 INJECTION SUBCUTANEOUS at 06:56

## 2020-01-01 RX ADMIN — Medication 1 DROP(S): at 16:27

## 2020-01-01 RX ADMIN — MIDODRINE HYDROCHLORIDE 15 MILLIGRAM(S): 2.5 TABLET ORAL at 23:25

## 2020-01-01 RX ADMIN — Medication 50 MICROGRAM(S): at 23:52

## 2020-01-01 RX ADMIN — PANTOPRAZOLE SODIUM 40 MILLIGRAM(S): 20 TABLET, DELAYED RELEASE ORAL at 13:27

## 2020-01-01 RX ADMIN — Medication 10 MILLIGRAM(S): at 10:15

## 2020-01-01 RX ADMIN — Medication 650 MILLIGRAM(S): at 02:09

## 2020-01-01 RX ADMIN — CHLORHEXIDINE GLUCONATE 1 APPLICATION(S): 213 SOLUTION TOPICAL at 12:22

## 2020-01-01 RX ADMIN — Medication 1000 UNIT(S): at 11:04

## 2020-01-01 RX ADMIN — MIDODRINE HYDROCHLORIDE 15 MILLIGRAM(S): 2.5 TABLET ORAL at 21:46

## 2020-01-01 RX ADMIN — Medication 650 MILLIGRAM(S): at 20:57

## 2020-01-01 RX ADMIN — Medication 62.5 MILLIMOLE(S): at 10:37

## 2020-01-01 RX ADMIN — Medication 1 DROP(S): at 06:22

## 2020-01-01 RX ADMIN — Medication 200 MILLIGRAM(S): at 04:57

## 2020-01-01 RX ADMIN — Medication 10 MILLIGRAM(S): at 04:36

## 2020-01-01 RX ADMIN — CHLORHEXIDINE GLUCONATE 1 APPLICATION(S): 213 SOLUTION TOPICAL at 12:00

## 2020-01-01 RX ADMIN — Medication 400 MILLIGRAM(S): at 09:20

## 2020-01-01 RX ADMIN — Medication 650 MILLIGRAM(S): at 13:58

## 2020-01-01 RX ADMIN — CHLORHEXIDINE GLUCONATE 1 APPLICATION(S): 213 SOLUTION TOPICAL at 12:18

## 2020-01-01 RX ADMIN — Medication 1000 MILLIGRAM(S): at 14:31

## 2020-01-01 RX ADMIN — Medication 8.16 MICROGRAM(S)/KG/MIN: at 15:15

## 2020-01-01 RX ADMIN — MIDODRINE HYDROCHLORIDE 15 MILLIGRAM(S): 2.5 TABLET ORAL at 21:01

## 2020-01-01 RX ADMIN — PANTOPRAZOLE SODIUM 40 MILLIGRAM(S): 20 TABLET, DELAYED RELEASE ORAL at 13:36

## 2020-01-01 RX ADMIN — Medication 50 MICROGRAM(S): at 23:35

## 2020-01-01 RX ADMIN — Medication 0: at 17:19

## 2020-01-01 RX ADMIN — ENOXAPARIN SODIUM 80 MILLIGRAM(S): 100 INJECTION SUBCUTANEOUS at 17:36

## 2020-01-01 RX ADMIN — HYDROMORPHONE HYDROCHLORIDE 1 MG/HR: 2 INJECTION INTRAMUSCULAR; INTRAVENOUS; SUBCUTANEOUS at 06:06

## 2020-01-01 RX ADMIN — ENOXAPARIN SODIUM 80 MILLIGRAM(S): 100 INJECTION SUBCUTANEOUS at 04:36

## 2020-01-01 RX ADMIN — ENOXAPARIN SODIUM 80 MILLIGRAM(S): 100 INJECTION SUBCUTANEOUS at 05:10

## 2020-01-01 RX ADMIN — CHLORHEXIDINE GLUCONATE 15 MILLILITER(S): 213 SOLUTION TOPICAL at 06:15

## 2020-01-01 RX ADMIN — Medication 1000 MILLIGRAM(S): at 11:23

## 2020-01-01 RX ADMIN — Medication 100 MILLIGRAM(S): at 21:20

## 2020-01-01 RX ADMIN — Medication 1 DROP(S): at 16:39

## 2020-01-01 RX ADMIN — CHLORHEXIDINE GLUCONATE 15 MILLILITER(S): 213 SOLUTION TOPICAL at 04:27

## 2020-01-01 RX ADMIN — SODIUM CHLORIDE 500 MILLILITER(S): 9 INJECTION, SOLUTION INTRAVENOUS at 13:32

## 2020-01-01 RX ADMIN — Medication 650 MILLIGRAM(S): at 00:29

## 2020-01-01 RX ADMIN — Medication 80 MILLIGRAM(S): at 04:57

## 2020-01-01 RX ADMIN — MIDAZOLAM HYDROCHLORIDE 1.7 MG/KG/HR: 1 INJECTION, SOLUTION INTRAMUSCULAR; INTRAVENOUS at 06:09

## 2020-01-01 RX ADMIN — MIDODRINE HYDROCHLORIDE 15 MILLIGRAM(S): 2.5 TABLET ORAL at 05:22

## 2020-01-01 RX ADMIN — Medication 200 MILLIGRAM(S): at 11:10

## 2020-01-01 RX ADMIN — MIDAZOLAM HYDROCHLORIDE 3 MG/KG/HR: 1 INJECTION, SOLUTION INTRAMUSCULAR; INTRAVENOUS at 01:42

## 2020-01-01 RX ADMIN — PANTOPRAZOLE SODIUM 40 MILLIGRAM(S): 20 TABLET, DELAYED RELEASE ORAL at 11:57

## 2020-01-01 RX ADMIN — Medication 1 DROP(S): at 17:31

## 2020-01-01 RX ADMIN — CHLORHEXIDINE GLUCONATE 1 APPLICATION(S): 213 SOLUTION TOPICAL at 12:15

## 2020-01-01 RX ADMIN — MIDODRINE HYDROCHLORIDE 15 MILLIGRAM(S): 2.5 TABLET ORAL at 20:25

## 2020-01-01 RX ADMIN — Medication 80 MILLIGRAM(S): at 21:20

## 2020-01-01 RX ADMIN — MEROPENEM 100 MILLIGRAM(S): 1 INJECTION INTRAVENOUS at 13:35

## 2020-01-01 RX ADMIN — ALBUTEROL 4 PUFF(S): 90 AEROSOL, METERED ORAL at 05:55

## 2020-01-01 RX ADMIN — Medication 8.16 MICROGRAM(S)/KG/MIN: at 15:39

## 2020-01-01 RX ADMIN — CEFTRIAXONE 100 MILLIGRAM(S): 500 INJECTION, POWDER, FOR SOLUTION INTRAMUSCULAR; INTRAVENOUS at 03:45

## 2020-01-01 RX ADMIN — Medication 1000 UNIT(S): at 09:27

## 2020-01-01 RX ADMIN — Medication 2: at 10:22

## 2020-01-01 RX ADMIN — PANTOPRAZOLE SODIUM 40 MILLIGRAM(S): 20 TABLET, DELAYED RELEASE ORAL at 09:10

## 2020-01-01 RX ADMIN — MIDAZOLAM HYDROCHLORIDE 1.7 MG/KG/HR: 1 INJECTION, SOLUTION INTRAMUSCULAR; INTRAVENOUS at 02:30

## 2020-01-01 RX ADMIN — HYDROMORPHONE HYDROCHLORIDE 1 MG/HR: 2 INJECTION INTRAMUSCULAR; INTRAVENOUS; SUBCUTANEOUS at 23:25

## 2020-01-01 RX ADMIN — MIDODRINE HYDROCHLORIDE 15 MILLIGRAM(S): 2.5 TABLET ORAL at 21:23

## 2020-01-01 RX ADMIN — Medication 10 MILLIGRAM(S): at 10:17

## 2020-01-01 RX ADMIN — ENOXAPARIN SODIUM 80 MILLIGRAM(S): 100 INJECTION SUBCUTANEOUS at 05:08

## 2020-01-01 RX ADMIN — CHLORHEXIDINE GLUCONATE 15 MILLILITER(S): 213 SOLUTION TOPICAL at 04:49

## 2020-01-01 RX ADMIN — CHLORHEXIDINE GLUCONATE 15 MILLILITER(S): 213 SOLUTION TOPICAL at 05:21

## 2020-01-01 RX ADMIN — MEROPENEM 100 MILLIGRAM(S): 1 INJECTION INTRAVENOUS at 15:47

## 2020-01-01 RX ADMIN — Medication 80 MILLIGRAM(S): at 04:45

## 2020-01-01 RX ADMIN — PROPOFOL 10.2 MICROGRAM(S)/KG/MIN: 10 INJECTION, EMULSION INTRAVENOUS at 01:16

## 2020-01-01 RX ADMIN — CHLORHEXIDINE GLUCONATE 15 MILLILITER(S): 213 SOLUTION TOPICAL at 16:08

## 2020-01-01 RX ADMIN — Medication 100 MILLIGRAM(S): at 04:56

## 2020-01-01 RX ADMIN — Medication 1 DROP(S): at 05:30

## 2020-01-01 RX ADMIN — MIDAZOLAM HYDROCHLORIDE 1.7 MG/KG/HR: 1 INJECTION, SOLUTION INTRAMUSCULAR; INTRAVENOUS at 11:24

## 2020-01-01 RX ADMIN — Medication 1 DROP(S): at 17:19

## 2020-01-01 RX ADMIN — CHLORHEXIDINE GLUCONATE 1 APPLICATION(S): 213 SOLUTION TOPICAL at 07:47

## 2020-01-01 RX ADMIN — Medication 10 MILLIGRAM(S): at 05:57

## 2020-01-01 RX ADMIN — Medication 8.16 MICROGRAM(S)/KG/MIN: at 19:51

## 2020-01-01 RX ADMIN — Medication 10 MILLIGRAM(S): at 14:44

## 2020-01-01 RX ADMIN — Medication 50 MICROGRAM(S): at 22:43

## 2020-04-07 NOTE — ED PROVIDER NOTE - OBJECTIVE STATEMENT
44yoF with h/o hypothyroidism, pw fatigue, nasal congestion, sore throat since Friday, cough /SOB since Sat. Unaware that she had fever; no chest pain, vomiting, or diarrhea. Denies any smoking/ sick contacts.  Lives w/ three friends.

## 2020-04-07 NOTE — ED PROVIDER NOTE - CLINICAL SUMMARY MEDICAL DECISION MAKING FREE TEXT BOX
Pt with presumed COVID; in no resp distress. Saturating 91% on 4L NC; will uptitrate NC / NRB as needed;  will order labs/ CXR/ adm

## 2020-04-07 NOTE — ED PROVIDER NOTE - PROGRESS NOTE DETAILS
Saturation 91% on 4L NC Labs and CXR findings as noted.  Case d/w Hospitalist and will admit to pulse ox bed

## 2020-04-07 NOTE — ED ADULT NURSE NOTE - OBJECTIVE STATEMENT
pt with sob fever not feeling well for a couple days  pt arrived from her country [facility] 3 mths ago.    spoke to pt via [place] inter Evette.  pt denies any friend/family sick with covid.... denies being swabbed.

## 2020-04-08 NOTE — ED ADULT NURSE REASSESSMENT NOTE - NS ED NURSE REASSESS COMMENT FT1
Attempted to call report to 2Gu. Informed that RN cannot take report at this time. Was asked to call back in approx 10 min.

## 2020-04-08 NOTE — H&P ADULT - ASSESSMENT
A 45 y/o Female with PMHx significant for Hypothyroidism, Depression, presents today c/o SOB, nasal congestion, cough with clear sputum production, muscle aches and chills. Admitted for Acute Respiratory Failure with Hypoxia likely secondary to viral PNA, COVID pending   At the ED noted to desat to low 84' on RA, improved to 95% on 5-6L NC, Temp: 102.2; CXR:  Extensive bilateral airspace opacities may represent multifocal pneumonia    > Admit to medicine service  > Bed: Monitor and    > Diet: Regular   > Nursing: vitals per routine  > Oxygen: O2 via NC; keep O2 saturation > 92%    Acute respiratory failure with hypoxia likely to viral pneumonia, pending CoVID-19 result    Admit to medical floor with    CoVID-19 pending   Inflammatory makers elevated (CRP, LDH, Ferritin)   Plaquenil started as per protocol; Please consult ID in AM to continue the med.   EKG, Qtc: 454  Oxygen therapy   Isolation protocol   Tylenol PRN   Prone positioning every hour for 15minutes or more as tolerated   F/u am labs  CXR:  Extensive bilateral airspace opacities may represent multifocal pneumonia  Given concerning for super imposed bacterial PNA, will give Rocephin x1, to be reassess in by primary team    Transaminitis   AST elevated, normal ALT   Likely due to viral illness    Trend LFT     Hypothyroidism   C/w Synthroid     Depression  C/w Paroxetine 12.5mg, home regimen     Supportive   DVT prophylaxis: Lovenox     Code status: Full code

## 2020-04-08 NOTE — CHART NOTE - NSCHARTNOTEFT_GEN_A_CORE
Patient seen and examined by me as daytime provider taking over care from Nocturnist. Agree with assessment + plan with necessary addendums/elaborations below:    clinically stable. acute resp failure w/ hypoxia sec to suspected COVID19 pna, stable - supplemental o2, titrate to RA as tolerated, not o2 dependent @ baseline. supportive care for symptoms. hfa only for sob. vitamin supplementation. prone positioning at least 2hrs per session x2 sessions daily, longer if tolerated. trend inflammatory markers. empirically on hydroxychloroquine - will cont if covid pcr positive AND o2 sat remaining < 94% as QTC < 500 - serial monitoring QTC while on it. ID cs as needed - not currently indicated. low threshold for MICU cs if acute and/or severe resp decline noted or if pt remaining hypoxic on non rebreather to assess for intubation. blood cx x1 pending - in collection.     dvt ppx. lovenox    dispo. pending improvement/resolution of resp failure

## 2020-04-08 NOTE — H&P ADULT - NSHPPHYSICALEXAM_GEN_ALL_CORE
Vital Signs Last 24 Hrs  T(C): 37 (08 Apr 2020 02:44), Max: 39 (07 Apr 2020 20:49)  T(F): 98.6 (08 Apr 2020 02:44), Max: 102.2 (07 Apr 2020 20:49)  HR: 103 (08 Apr 2020 02:44) (103 - 121)  BP: 107/70 (08 Apr 2020 02:44) (107/70 - 137/83)  RR: 21 (08 Apr 2020 02:44) (21 - 24)  SpO2: 92% (08 Apr 2020 02:44) (84% - 95%)    General: Well nourished/Well developed, NAD  Head:  Normocephalic, atraumatic  ENT:  Airway patent, Nasal mucosa clear. Mouth with normal mucosa. Throat has no vesicles, no oropharyngeal exudates and uvula is midline. (ED provider exam)  Neck:  Supple, no sinuses or palpable masses  Lymphatic:  No palpable cervical, supraclavicular, axillary or inguinal adenopathy  Respiratory: Bibasilar crackles   CV: RRR, S1S2, no murmur  GI: Soft, NT, ND no palpable mass  Extremities: No edema, + peripheral pulses, FROM all 4 extremity  Neurology: A&Ox3, no focalization signs

## 2020-04-08 NOTE — H&P ADULT - NSHPLABSRESULTS_GEN_ALL_CORE
13.3   5.71  )-----------( 290      ( 07 Apr 2020 20:52 )             40.3       04-07    136  |  95<L>  |  6.0<L>  ----------------------------<  112<H>  3.8   |  25.0  |  0.80    Ca    8.7      07 Apr 2020 20:52    TPro  8.1  /  Alb  4.1  /  TBili  0.3<L>  /  DBili  x   /  AST  71<H>  /  ALT  31  /  AlkPhos  64  04-07      < from: Xray Chest 1 View AP/PA. (04.07.20 @ 21:05) >       EXAM:  XR CHEST AP OR PA 1V                          PROCEDURE DATE:  04/07/2020      INTERPRETATION:  CLINICAL INDICATION: Shortness of breath cough and fever  Portable frontal view of the chest is submitted.  COMPARISON: None  FINDINGS: Cardiac silhouette is prominent which may be in part due to the portable technique. Extensive bilateral airspace opacities. Costophrenic angles are sharp. The osseous structures are grossly unremarkable.  IMPRESSION:   Extensive bilateral airspace opacities may represent multifocal pneumonia in the appropriate clinical setting.

## 2020-04-08 NOTE — ED ADULT NURSE REASSESSMENT NOTE - NS ED NURSE REASSESS COMMENT FT1
Attempted to call report again to floor, but noticed that at this time, bed board states bed unassigned. Will continue to check back.

## 2020-04-08 NOTE — H&P ADULT - HISTORY OF PRESENT ILLNESS
A 45 y/o Female with PMHx significant for Hypothyroidism, Depression, presents today c/o SOB, nasal congestion, cough with clear sputum production, muscle aches and chills. Pt reports her symptoms started 3 days ago, she tried cough medicine but symptoms failed to improved. Also endorses fatigue, SOB and chest tightness that worsened today.   Patient denies any sick contact or recent travel history.   At the ED noted to desat to low 84' on RA, improved to 95% on 5-6L NC, Temp: 102.2; CXR:  Extensive bilateral airspace opacities may represent multifocal pneumonia

## 2020-04-09 NOTE — PROGRESS NOTE ADULT - SUBJECTIVE AND OBJECTIVE BOX
Patient is a 44y old  Female who presents with a chief complaint of SOB (08 Apr 2020 02:47)      INTERVAL HPI/OVERNIGHT EVENTS: seen and examined. Was laying in prone position secondary to respiratory distress. No new complains. On NR.     MEDICATIONS  (STANDING):  ascorbic acid 1000 milliGRAM(s) Oral daily  azithromycin  IVPB      azithromycin  IVPB 250 milliGRAM(s) IV Intermittent every 24 hours  benzonatate 100 milliGRAM(s) Oral three times a day  cholecalciferol 1000 Unit(s) Oral daily  enoxaparin Injectable 40 milliGRAM(s) SubCutaneous two times a day  hydroxychloroquine   Oral   hydroxychloroquine 200 milliGRAM(s) Oral every 12 hours  levothyroxine 100 MICROGram(s) Oral daily  methylPREDNISolone sodium succinate Injectable 80 milliGRAM(s) IV Push every 8 hours  PARoxetine 10 milliGRAM(s) Oral daily  thiamine 200 milliGRAM(s) Oral daily    MEDICATIONS  (PRN):  acetaminophen   Tablet .. 650 milliGRAM(s) Oral every 4 hours PRN Temp greater or equal to 38.5C (101.3F)  ALBUTerol    90 MICROgram(s) HFA Inhaler 2 Puff(s) Inhalation every 4 hours PRN Shortness of Breath and/or Wheezing  baclofen 10 milliGRAM(s) Oral every 8 hours PRN Muscle Spasm/aches  hydrocodone/homatropine Syrup 5 milliLiter(s) Oral every 6 hours PRN Cough      Allergies    No Known Allergies    Intolerances        REVIEW OF SYSTEMS:  CONSTITUTIONAL: No fever, weight loss, or fatigue  RESPIRATORY: No cough, wheezing, chills or hemoptysis; No shortness of breath  CARDIOVASCULAR: No chest pain, palpitations, dizziness, or leg swelling  GASTROINTESTINAL: No abdominal or epigastric pain. No nausea, vomiting, or hematemesis; No diarrhea or constipation. No melena or hematochezia.  NEUROLOGICAL: No headaches, memory loss, loss of strength, numbness, or tremors  MUSCULOSKELETAL: No joint pain or swelling; No muscle, back, or extremity pain      Vital Signs Last 24 Hrs  T(C): 36.8 (09 Apr 2020 09:00), Max: 37.9 (09 Apr 2020 02:02)  T(F): 98.2 (09 Apr 2020 09:00), Max: 100.3 (09 Apr 2020 02:02)  HR: 90 (09 Apr 2020 09:00) (90 - 105)  BP: 107/74 (09 Apr 2020 09:00) (107/74 - 133/77)  BP(mean): --  RR: 20 (09 Apr 2020 09:00) (20 - 40)  SpO2: 93% (09 Apr 2020 05:37) (87% - 94%)    PHYSICAL EXAM:  GENERAL: in moderate respiratory distress, laying on her abdomen   HEAD:  Atraumatic, Normocephalic  EYES: EOMI, PERRLA, conjunctiva and sclera clear  NECK: Supple, No JVD, Normal thyroid  NERVOUS SYSTEM:  Alert & Oriented X3, No gross focal deficits  CHEST/LUNG: b/l rales and crackles   HEART: Regular rate and rhythm; No murmurs, rubs, or gallops  ABDOMEN: Soft, Nontender, Nondistended; Bowel sounds present  EXTREMITIES:  No clubbing, cyanosis, or edema  SKIN: No rashes or lesions    LABS:                        13.3   5.71  )-----------( 290      ( 07 Apr 2020 20:52 )             40.3     04-07    136  |  95<L>  |  6.0<L>  ----------------------------<  112<H>  3.8   |  25.0  |  0.80    Ca    8.7      07 Apr 2020 20:52    TPro  8.1  /  Alb  4.1  /  TBili  0.3<L>  /  DBili  x   /  AST  71<H>  /  ALT  31  /  AlkPhos  64  04-07        CAPILLARY BLOOD GLUCOSE          RADIOLOGY & ADDITIONAL TESTS:    Imaging Personally Reviewed:  [ ] YES  [ ] NO    Consultant(s) Notes Reviewed:  [ ] YES  [ ] NO    Care Discussed with Consultants/Other Providers [ ] YES  [ ] NO    Plan of Care discussed with Housestaff [ ]YES [ ] NO

## 2020-04-09 NOTE — PROGRESS NOTE ADULT - ASSESSMENT
A 45 y/o Female with PMHx significant for Hypothyroidism, Depression, presents today c/o SOB, nasal congestion, cough with clear sputum production, muscle aches and chills. Admitted for Acute Respiratory Failure with Hypoxia likely secondary to viral PNA

## 2020-04-09 NOTE — PROGRESS NOTE ADULT - PROBLEM SELECTOR PLAN 1
Acute respiratory failure secondary to viral PNA secondary to COVID   afebrile, COVID PCR positive   On NR   Started Solumedrol 80 mg IV q8   c/w Plaquenil, Proventil and Azithromycin   c/w frequent proning     Impending intubation

## 2020-04-09 NOTE — ED ADULT NURSE REASSESSMENT NOTE - NS ED NURSE REASSESS COMMENT FT1
MD Bravo made aware of 87% spo2 on nasal cannula and NRB. MD Bravo at bedside to evaluate patient.  Eveline at bedside to translate. pt denies sob. pt instructed to deep breath slowly, pt able to follow directions and spo2 increased to 97%. MD Bravo aware of decreasing spo2 while patient sleeping, pt in stable condition at this time, will notify MD of any further changes.

## 2020-04-10 NOTE — PROGRESS NOTE ADULT - SUBJECTIVE AND OBJECTIVE BOX
CC: F/u viral pneumonia, +COVID-19  INTERVAL HPI/OVERNIGHT EVENTS: Pt seen and examined at bedside this AM by Hospitalist and PA. Pt on NRB, O2 sat 91% + cough. Denies fever, chills, chest pain, difficulty breathing, abdominal pain, n/v or any other complaints. Ambulating to restroom without difficulties.     REVIEW OF SYSTEMS:  CONSTITUTIONAL: No fever, weight loss, or fatigue  EYES: No eye pain, visual disturbances, or discharge  ENT:  No difficulty hearing, tinnitus, vertigo; No sinus or throat pain  NECK: No pain or stiffness  RESPIRATORY: see HPI  CARDIOVASCULAR: No CP , palpitations, dizziness, or leg swelling  GASTROINTESTINAL: No abdominal or epigastric pain. No nausea, vomiting, or hematemesis; No diarrhea or constipation.  GENITOURINARY: No dysuria, frequency, hematuria, or incontinence  NEUROLOGICAL: No HA, memory loss, loss of strength, numbness, or tremors  SKIN: No itching, burning, rashes, or lesions   MUSCULOSKELETAL: No joint pain or swelling; No muscle, back, or extremity pain    Allergies    No Known Allergies    Intolerances      HEALTH ISSUES - PROBLEM Dx:  Prophylactic measure: Prophylactic measure  Hypothyroidism: Hypothyroidism  Depression: Depression  Multifocal pneumonia: Multifocal pneumonia  Acute respiratory failure with hypoxia: Acute respiratory failure with hypoxia      PAST MEDICAL & SURGICAL HISTORY:  Depression  Hypothyroidism    VITAL SIGNS:  T(C): 36.4 (04-10-20 @ 07:28), Max: 37.3 (04-09-20 @ 16:23)  HR: 91 (04-10-20 @ 07:28) (80 - 91)  BP: 106/58 (04-10-20 @ 07:28) (106/58 - 135/81)  RR: 22 (04-10-20 @ 07:28) (20 - 22)  SpO2: 91% (04-10-20 @ 07:28) (91% - 97%)  Wt(kg): --Vital Signs Last 24 Hrs  T(C): 36.4 (10 Apr 2020 07:28), Max: 37.3 (09 Apr 2020 16:23)  T(F): 97.6 (10 Apr 2020 07:28), Max: 99.2 (09 Apr 2020 16:23)  HR: 91 (10 Apr 2020 07:28) (80 - 91)  BP: 106/58 (10 Apr 2020 07:28) (106/58 - 135/81)  BP(mean): --  RR: 22 (10 Apr 2020 07:28) (20 - 22)  SpO2: 91% (10 Apr 2020 07:28) (91% - 97%)    PHYSICAL EXAM:  GENERAL: Pt lying in bed comfortably in NAD  HEAD:  Atraumatic, Normocephalic  EYES: EOMI, PERRL, conjunctiva and sclera clear  ENT: Moist mucous membranes  NECK: Supple, No JVD  CHEST/LUNG: Clear to auscultation bilaterally; No rales, rhonchi, wheezing, or rubs. Unlabored respirations  HEART: Regular rate and rhythm; No murmurs, rubs, or gallops  ABDOMEN: Bowel sounds present; Soft, Nontender, Nondistended. No guarding or rigidity   EXTREMITIES:  2+ Peripheral Pulses, brisk capillary refill. No clubbing, cyanosis, or edema  NERVOUS SYSTEM:  Alert & Oriented X3, speech clear, FROM x 4 extremities. No deficits   SKIN: No rashes or lesions    LABS:                         13.1   9.50  )-----------( 421      ( 10 Apr 2020 09:44 )             40.1     04-10    139  |  95<L>  |  8.0  ----------------------------<  127<H>  4.5   |  27.0  |  0.74    Ca    9.1      10 Apr 2020 09:44  Mg     2.4     04-10      Procalcitonin, Serum: 0.08 ng/mL (04-07-20 @ 20:52)      Procalcitonin, Serum: 0.08 (04-07)    C-Reactive Protein, Serum: 8.19 (04-07)    Ferritin, Serum: 324 (04-07)        COVID-19 PCR: Positive for 2019-nCoV NOTE: The COVID-19 assay has been cleared by the U.S. Food and Drug  Administration under the Emergency Use Authorization (EUA). [facility] is designated as a high complexity laboratory by the Clinical  Laboratory Improvement Amendments of 1988(CLIA) and is qualified to  perform  this test. ASSAY INFORMATION: Real Time RT-PCR  Performed By:  [facility]  [address] [place] [address]  Louis Khan M.D. (04-07-20 @ 22:37)    CAPILLARY BLOOD GLUCOSE CC: F/u viral pneumonia, +COVID-19  INTERVAL HPI/OVERNIGHT EVENTS: Pt seen and examined at bedside this AM by Hospitalist and PA. Pt on NRB, O2 sat 91% + cough. Denies fever, chills, chest pain, difficulty breathing, abdominal pain, NVD or any other complaints. Ambulating to restroom without difficulties.     REVIEW OF SYSTEMS:  CONSTITUTIONAL: No fever, weight loss, or fatigue  EYES: No eye pain, visual disturbances, or discharge  ENT:  No difficulty hearing, tinnitus, vertigo; No sinus or throat pain  NECK: No pain or stiffness, no evidence of JVD  RESPIRATORY: see HPI  CARDIOVASCULAR: No CP , palpitations, dizziness, or leg swelling  GASTROINTESTINAL: No abdominal or epigastric pain. No NVD or hematemesis; No constipation.  GENITOURINARY: No dysuria, frequency, hematuria, or incontinence  NEUROLOGICAL: No HA, memory loss, loss of strength, numbness, or tremors  SKIN: No itching, burning, rashes, or lesions or disclorations  MUSCULOSKELETAL: No joint pain or swelling; No muscle, back, or extremity pain    Allergies: NKA    HEALTH ISSUES - PROBLEM Dx:  Prophylactic measure: Prophylactic measure  Hypothyroidism: Hypothyroidism  Depression: Depression  Multifocal pneumonia: Multifocal pneumonia  Acute respiratory failure with hypoxia: Acute respiratory failure with hypoxia      PAST MEDICAL & SURGICAL HISTORY:  Depression  Hypothyroidism    VITAL SIGNS:  T(C): 36.4 (04-10-20 @ 07:28), Max: 37.3 (04-09-20 @ 16:23)  HR: 91 (04-10-20 @ 07:28) (80 - 91)  BP: 106/58 (04-10-20 @ 07:28) (106/58 - 135/81)  RR: 22 (04-10-20 @ 07:28) (20 - 22)  SpO2: 91% (04-10-20 @ 07:28) (91% - 97%)  Wt(kg): --Vital Signs Last 24 Hrs  T(C): 36.4 (10 Apr 2020 07:28), Max: 37.3 (09 Apr 2020 16:23)  T(F): 97.6 (10 Apr 2020 07:28), Max: 99.2 (09 Apr 2020 16:23)  HR: 91 (10 Apr 2020 07:28) (80 - 91)  BP: 106/58 (10 Apr 2020 07:28) (106/58 - 135/81)  BP(mean): --  RR: 22 (10 Apr 2020 07:28) (20 - 22)  SpO2: 91% (10 Apr 2020 07:28) (91% - 97%)    PHYSICAL EXAM:  GENERAL: Pt lying prone in bed comfortably in NA, w/NRB at 15L,  91%  HEAD:  Atraumatic, Normocephalic  EYES: EOMI, PERRL, conjunctiva and sclera clear  ENT: Moist mucous membranes  NECK: Supple, No JVD  CHEST/LUNG: CTABL; No rales, rhonchi, wheezing, or rubs. Unlabored respirations  HEART: RRR; No murmurs, rubs, or gallops  ABDOMEN: Bowel sounds present; Soft, Nontender, Nondistended. No guarding or rigidity   EXTREMITIES:  2+ Peripheral Pulses, brisk capillary refill. No clubbing, cyanosis, or edema  NERVOUS SYSTEM:  Alert & Oriented X3, speech clear, FROM x 4 extremities. No deficits   SKIN: No rashes or lesions    LABS:                         13.1   9.50  )-----------( 421      ( 10 Apr 2020 09:44 )             40.1     04-10    139  |  95<L>  |  8.0  ----------------------------<  127<H>  4.5   |  27.0  |  0.74    Ca    9.1      10 Apr 2020 09:44  Mg     2.4     04-10      Procalcitonin, Serum: 0.08 ng/mL (04-07-20 @ 20:52)      Procalcitonin, Serum: 0.08 (04-07)    C-Reactive Protein, Serum: 8.19 (04-07)    Ferritin, Serum: 324 (04-07)        COVID-19 PCR: Positive for 2019-nCoV NOTE: The COVID-19 assay has been cleared by the U.S. Food and Drug  Administration under the Emergency Use Authorization (EUA). [facility] is designated as a high complexity laboratory by the Clinical  Laboratory Improvement Amendments of 1988(CLIA) and is qualified to  perform  this test. ASSAY INFORMATION: Real Time RT-PCR  Performed By:  [facility]  [address]  [place] [id number]  Louis Khan M.D. (04-07-20 @ 22:37)    CAPILLARY BLOOD GLUCOSE CC: F/u viral pneumonia, +COVID-19  INTERVAL HPI/OVERNIGHT EVENTS: Pt seen and examined at bedside this AM by Hospitalist and PA. Pt on NRB, O2 sat 91% + cough. Denies fever, chills, chest pain, difficulty breathing, abdominal pain, NVD or any other complaints. Ambulating to restroom without difficulties.     REVIEW OF SYSTEMS:  CONSTITUTIONAL: No fever, weight loss, or fatigue  EYES: No eye pain, visual disturbances, or discharge  ENT:  No difficulty hearing, tinnitus, vertigo; No sinus or throat pain  NECK: No pain or stiffness, no evidence of JVD  RESPIRATORY: see HPI  CARDIOVASCULAR: No CP , palpitations, dizziness, or leg swelling  GASTROINTESTINAL: No abdominal or epigastric pain. No NVD or hematemesis; No constipation.  GENITOURINARY: No dysuria, frequency, hematuria, or incontinence  NEUROLOGICAL: No HA, memory loss, loss of strength, numbness, or tremors  SKIN: No itching, burning, rashes, or lesions or disclorations  MUSCULOSKELETAL: No joint pain or swelling; No muscle, back, or extremity pain    Allergies: NKA    HEALTH ISSUES - PROBLEM Dx:  Prophylactic measure: Prophylactic measure  Hypothyroidism: Hypothyroidism  Depression: Depression  Multifocal pneumonia: Multifocal pneumonia  Acute respiratory failure with hypoxia: Acute respiratory failure with hypoxia      PAST MEDICAL & SURGICAL HISTORY:  Depression  Hypothyroidism    VITAL SIGNS:  T(C): 36.4 (04-10-20 @ 07:28), Max: 37.3 (04-09-20 @ 16:23)  HR: 91 (04-10-20 @ 07:28) (80 - 91)  BP: 106/58 (04-10-20 @ 07:28) (106/58 - 135/81)  RR: 22 (04-10-20 @ 07:28) (20 - 22)  SpO2: 91% (04-10-20 @ 07:28) (91% - 97%)  Wt(kg): --Vital Signs Last 24 Hrs  T(C): 36.4 (10 Apr 2020 07:28), Max: 37.3 (09 Apr 2020 16:23)  T(F): 97.6 (10 Apr 2020 07:28), Max: 99.2 (09 Apr 2020 16:23)  HR: 91 (10 Apr 2020 07:28) (80 - 91)  BP: 106/58 (10 Apr 2020 07:28) (106/58 - 135/81)  BP(mean): --  RR: 22 (10 Apr 2020 07:28) (20 - 22)  SpO2: 91% (10 Apr 2020 07:28) (91% - 97%)    PHYSICAL EXAM:  GENERAL: Pt lying prone in bed comfortably in NA, w/NRB at 15L,  91%  HEAD:  Atraumatic, Normocephalic  EYES: EOMI, PERRL, conjunctiva and sclera clear  ENT: Moist mucous membranes  NECK: Supple, No JVD  CHEST/LUNG: CTABL; No rales, rhonchi, wheezing, or rubs. Unlabored respirations. Pt on NRB 15LPM, O2 sat 91%  HEART: RRR; No murmurs, rubs, or gallops  ABDOMEN: Bowel sounds present; Soft, NTTP, ND. No guarding or rigidity   EXTREMITIES:  2+ Peripheral Pulses, brisk capillary refill. No clubbing, cyanosis, or edema  NERVOUS SYSTEM:  A&O  X3, speech clear, FROM x 4 extremities. No deficits   SKIN: No rashes or lesions, discolorations or swellings    LABS:                         13.1   9.50  )-----------( 421      ( 10 Apr 2020 09:44 )             40.1     04-10    139  |  95<L>  |  8.0  ----------------------------<  127<H>  4.5   |  27.0  |  0.74    Ca    9.1      10 Apr 2020 09:44  Mg     2.4     04-10      Procalcitonin, Serum: 0.08 ng/mL (04-07-20 @ 20:52)  Procalcitonin, Serum: 0.08 (04-07)    C-Reactive Protein, Serum: 8.19 (04-07)    Ferritin, Serum: 324 (04-07)    COVID-19 PCR: Positive for 2019-nCoV NOTE: The COVID-19 assay has been cleared by the U.S. Food and Drug  Administration under the Emergency Use Authorization (EUA). [facility] is designated as a high complexity laboratory by the Clinical  Laboratory Improvement Amendments of 1988(CLIA) and is qualified to  perform  this test. ASSAY INFORMATION: Real Time RT-PCR  Performed By:  [facility]  [address] [place] [address]  Louis Khan M.D. (04-07-20 @ 22:37)    CAPILLARY BLOOD GLUCOSE CC: F/u viral pneumonia, +COVID-19  INTERVAL HPI/OVERNIGHT EVENTS: Pt seen and examined at bedside this AM by Hospitalist and PA. Pt on NRB, O2 sat 91% + cough. Denies fever, chills, chest pain, difficulty breathing, abdominal pain, NVD or any other complaints. Ambulating to restroom without difficulties.     REVIEW OF SYSTEMS:  CONSTITUTIONAL: No fever, weight loss, or fatigue  EYES: No eye pain, visual disturbances, or discharge  ENT:  No difficulty hearing, tinnitus, vertigo; No sinus or throat pain  NECK: No pain or stiffness, no evidence of JVD  RESPIRATORY: see HPI  CARDIOVASCULAR: No CP , palpitations, dizziness, or leg swelling  GASTROINTESTINAL: No abdominal or epigastric pain. No NVD or hematemesis; No constipation.  GENITOURINARY: No dysuria, frequency, hematuria, or incontinence  NEUROLOGICAL: No HA, memory loss, loss of strength, numbness, or tremors  SKIN: No itching, burning, rashes, or lesions or disclorations  MUSCULOSKELETAL: No joint pain or swelling; No muscle, back, or extremity pain    Allergies: NKA    HEALTH ISSUES - PROBLEM Dx:  Prophylactic measure: Prophylactic measure  Hypothyroidism: Hypothyroidism  Depression: Depression  Multifocal pneumonia: Multifocal pneumonia  Acute respiratory failure with hypoxia: Acute respiratory failure with hypoxia      PAST MEDICAL & SURGICAL HISTORY:  Depression  Hypothyroidism    VITAL SIGNS:  T(C): 36.4 (04-10-20 @ 07:28), Max: 37.3 (04-09-20 @ 16:23)  HR: 91 (04-10-20 @ 07:28) (80 - 91)  BP: 106/58 (04-10-20 @ 07:28) (106/58 - 135/81)  RR: 22 (04-10-20 @ 07:28) (20 - 22)  SpO2: 91% (04-10-20 @ 07:28) (91% - 97%)  Wt(kg): --Vital Signs Last 24 Hrs  T(C): 36.4 (10 Apr 2020 07:28), Max: 37.3 (09 Apr 2020 16:23)  T(F): 97.6 (10 Apr 2020 07:28), Max: 99.2 (09 Apr 2020 16:23)  HR: 91 (10 Apr 2020 07:28) (80 - 91)  BP: 106/58 (10 Apr 2020 07:28) (106/58 - 135/81)  RR: 22 (10 Apr 2020 07:28) (20 - 22)  SpO2: 91% (10 Apr 2020 07:28) (91% - 97%)    PHYSICAL EXAM:  GENERAL: Pt lying prone in bed comfortably in NA, w/NRB at 15L,  91%  HEAD:  Atraumatic, Normocephalic  EYES: EOMI, PERRL, conjunctiva and sclera clear  ENT: Moist mucous membranes  NECK: Supple, No JVD  CHEST/LUNG: CTABL; No rales, rhonchi, wheezing, or rubs. Unlabored respirations. Pt on NRB 15LPM, O2 sat 91%  HEART: RRR; No murmurs, rubs, or gallops  ABDOMEN: Bowel sounds present; Soft, NTTP, ND. No guarding or rigidity   EXTREMITIES:  2+ Peripheral Pulses, brisk capillary refill. No clubbing, cyanosis, or edema  NERVOUS SYSTEM:  A&O  X3, speech clear, FROM x 4 extremities. No deficits   SKIN: No rashes or lesions, discolorations or swellings    LABS:                         13.1   9.50  )-----------( 421      ( 10 Apr 2020 09:44 )             40.1     04-10    139  |  95<L>  |  8.0  ----------------------------<  127<H>  4.5   |  27.0  |  0.74    Ca    9.1      10 Apr 2020 09:44  Mg     2.4     04-10      Procalcitonin, Serum: 0.08 ng/mL (04-07-20 @ 20:52)  Procalcitonin, Serum: 0.08 (04-07)    C-Reactive Protein, Serum: 8.19 (04-07)    Ferritin, Serum: 324 (04-07)    COVID-19 PCR: Positive for 2019-nCoV NOTE: The COVID-19 assay has been cleared by the U.S. Food and Drug  Administration under the Emergency Use Authorization (EUA). [facility] is designated as a high complexity laboratory by the Clinical  Laboratory Improvement Amendments of 1988(CLIA) and is qualified to  perform  this test. ASSAY INFORMATION: Real Time RT-PCR  Performed By:  [facility]  [address] [place] [address]  Louis Khan M.D. (04-07-20 @ 22:37)

## 2020-04-10 NOTE — PROGRESS NOTE ADULT - ASSESSMENT
· Assessment	  A 45 y/o Female with PMHx significant for Hypothyroidism, Depression, c/o SOB, nasal congestion, cough with clear sputum production, muscle aches and chills. Admitted for Acute Respiratory Failure with Hypoxia likely secondary to viral PNA           Problem/Plan - 1:  ·  Problem: Acute respiratory failure with hypoxia.  Plan: Acute respiratory failure secondary to viral PNA secondary to COVID   afebrile, COVID PCR positive   On NRB 15LPM. Wean Pt to VM and maintain O2 sat <92%. Monitor and titrate as needed, Pt to c/w proning  use of Incentive spirometer  Started Solumedrol 80 mg IV q8 until 4/13/2020  c/w Plaquenil, Proventil and Azithromycin        Problem/Plan - 2:  ·  Problem: Multifocal pneumonia.  Plan: as above.   -Robitussin to suppress cough     Problem/Plan - 3:  ·  Problem: Depression.  Plan: c/w Paroxetine.      Problem/Plan - 4:  ·  Problem: Hypothyroidism.  Plan: c/w Levothyroxine.      Problem/Plan - 5:  ·  Problem: Prophylactic measure.  Plan: Enoxaparin for DVT prophylaxis. · Assessment	  A 45 y/o Female with PMHx significant for Hypothyroidism, Depression, c/o SOB, nasal congestion, cough with clear sputum production, muscle aches and chills. Admitted for Acute Respiratory Failure with Hypoxia likely secondary to viral PNA           Problem/Plan - 1:  ·  Problem: Acute respiratory failure with hypoxia.  Plan: Acute respiratory failure secondary to viral PNA secondary to COVID   afebrile, COVID PCR positive   On NRB 15LPM. Wean Pt to VM and maintain O2 sat >92%. Monitor and titrate as needed, Pt to c/w proning  use of Incentive spirometer  Started Solumedrol 80 mg IV q8 until 4/13/2020  c/w Plaquenil, Proventil and Azithromycin        Problem/Plan - 2:  ·  Problem: Multifocal pneumonia.  Plan: as above.   -Robitussin to suppress cough     Problem/Plan - 3:  ·  Problem: Depression.  Plan: c/w Paroxetine.      Problem/Plan - 4:  ·  Problem: Hypothyroidism.  Plan: c/w Levothyroxine.      Problem/Plan - 5:  ·  Problem: Prophylactic measure.  Plan: Enoxaparin for DVT prophylaxis. · Assessment	  A 45 y/o Female with PMHx significant for Hypothyroidism, Depression, c/o SOB, nasal congestion, cough with clear sputum production, muscle aches and chills. Admitted for Acute Respiratory Failure with Hypoxia likely secondary to viral PNA     Problem/Plan - 1:  ·  Problem: Acute respiratory failure with hypoxia.  Plan: Acute respiratory failure secondary to viral PNA secondary to COVID   afebrile, COVID PCR positive   On NRB 15LPM. Wean Pt to VM and maintain O2 sat >92%. Monitor and titrate as needed, Pt to c/w proning  use of Incentive spirometer  Started Solumedrol 80 mg IV q8 until 4/13/2020  c/w Plaquenil, Proventil and Azithromycin      Problem/Plan - 2:  ·  Problem: Multifocal pneumonia.  Plan: as above.   -Robitussin to suppress cough     Problem/Plan - 3:  ·  Problem: Depression.  Plan: c/w Paroxetine.      Problem/Plan - 4:  ·  Problem: Hypothyroidism.  Plan: c/w Levothyroxine.      Problem/Plan - 5:  ·  Problem: Prophylactic measure.  Plan: Enoxaparin for DVT prophylaxis.

## 2020-04-11 NOTE — PROGRESS NOTE ADULT - SUBJECTIVE AND OBJECTIVE BOX
Hypoxemia    HPI:  A 45 y/o Female with PMHx significant for Hypothyroidism, Depression, presents today c/o SOB, nasal congestion, cough with clear sputum production, muscle aches and chills. Pt reports her symptoms started 3 days ago, she tried cough medicine but symptoms failed to improved. Also endorses fatigue, SOB and chest tightness that worsened today.   Patient denies any sick contact or recent travel history.   At the ED noted to desat to low 84' on RA, improved to 95% on 5-6L NC, Temp: 102.2; CXR:  Extensive bilateral airspace opacities may represent multifocal pneumonia (08 Apr 2020 02:47)    Interval History:  Patient was seen and examined at bedside around 9:45 am.   SOB and cough are slightly better today.   Denies chest pain, palpitations, headache, dizziness, visual symptoms, nausea, vomiting, diarrhea or abdominal pain.    ROS:  As per interval history otherwise unremarkable.    PHYSICAL EXAM:  Vital Signs   T(C): 37 (11 Apr 2020 08:01), Max: 37 (11 Apr 2020 08:01)  T(F): 98.6 (11 Apr 2020 08:01), Max: 98.6 (11 Apr 2020 08:01)  HR: 86 (11 Apr 2020 08:01) (86 - 105)  BP: 99/64 (11 Apr 2020 08:01) (99/64 - 126/78)  RR: 24 (11 Apr 2020 08:01) (20 - 24)  SpO2: 92% (11 Apr 2020 08:01) (92% - 92%)  General: Well developed. Well nourished. No acute distress  HEENT: EOMI. Clear conjunctivae. Moist mucus membrane  Neck: Supple.   Chest: Good air entry. No wheezing, rales or rhonchi.   Heart: Normal S1 & S2. RRR.   Abdomen: Soft. Non-tender. Non-distended. + BS  Ext: No pedal edema. No calf tenderness   Neuro: AAO x 3. No focal deficit. No speech disorder  Skin: Warm and Dry  Psychiatry: Normal mood and affect    LABS:                       13.1   9.50  )-----------( 421      ( 10 Apr 2020 09:44 )             40.1     04-10    139  |  95<L>  |  8.0  ----------------------------<  127<H>  4.5   |  27.0  |  0.74    Ca    9.1      10 Apr 2020 09:44  Mg     2.4     04-10    RADIOLOGY & ADDITIONAL STUDIES:  Reviewed     MEDICATIONS  (STANDING):  ascorbic acid 1000 milliGRAM(s) Oral daily  benzonatate 100 milliGRAM(s) Oral three times a day  cholecalciferol 1000 Unit(s) Oral daily  enoxaparin Injectable 40 milliGRAM(s) SubCutaneous two times a day  hydroxychloroquine   Oral   hydroxychloroquine 200 milliGRAM(s) Oral every 12 hours  levothyroxine 100 MICROGram(s) Oral daily  methylPREDNISolone sodium succinate Injectable 80 milliGRAM(s) IV Push every 8 hours  pantoprazole    Tablet 40 milliGRAM(s) Oral before breakfast  PARoxetine 10 milliGRAM(s) Oral daily  thiamine 200 milliGRAM(s) Oral daily    MEDICATIONS  (PRN):  acetaminophen   Tablet .. 650 milliGRAM(s) Oral every 4 hours PRN Temp greater or equal to 38.5C (101.3F)  ALBUTerol    90 MICROgram(s) HFA Inhaler 2 Puff(s) Inhalation every 4 hours PRN Shortness of Breath and/or Wheezing  baclofen 10 milliGRAM(s) Oral every 8 hours PRN Muscle Spasm/aches  guaiFENesin   Syrup  (Sugar-Free) 100 milliGRAM(s) Oral every 6 hours PRN Cough  hydrocodone/homatropine Syrup 5 milliLiter(s) Oral every 6 hours PRN Cough

## 2020-04-11 NOTE — PROGRESS NOTE ADULT - ASSESSMENT
A 43 y/o Female with PMHx significant for Hypothyroidism, Depression, c/o SOB, nasal congestion, cough with clear sputum production, muscle aches and chills. Admitted for Acute Respiratory Failure with Hypoxia likely secondary to viral PNA    1) Acute Respiratory Failure with Hypoxia  - secondary to COVID-19 Pneumonia  - Continue Hydroxychloroquine  - Continue Solumedrol   - Tylenol, Albuterol and Tessalon Perle PRN  - Continue supplemental oxygen via NRB, wean off as tolerated  - Encourage for prone position every hour for 15 minutes  - Incentive spirometry    2) Hypothyroidism  - Continue Synthroid    3) Depression   - Continue Paroxetine    DVT Prophylaxis -- Lovenox 40 SQ    Dispo: Home in 3-4 days

## 2020-04-12 NOTE — PROGRESS NOTE ADULT - ASSESSMENT
A 43 y/o Female with PMHx significant for Hypothyroidism, Depression, c/o SOB, nasal congestion, cough with clear sputum production, muscle aches and chills. Admitted for Acute Respiratory Failure with Hypoxia likely secondary to viral PNA    1) Acute Respiratory Failure with Hypoxia  - secondary to COVID-19 Pneumonia  - Continue Hydroxychloroquine  - Continue Solumedrol   - Tylenol, Albuterol and Tessalon Perle PRN  - Continue supplemental oxygen via NRB, wean off as tolerated (unable to wean down so far)  - Encourage for prone position every hour for 15 minutes  - Incentive spirometry    2) Hypothyroidism  - Continue Synthroid    3) Depression   - Continue Paroxetine    DVT Prophylaxis -- Lovenox 40 SQ    GOC: Full Code.     Dispo: Home in 3-4 days

## 2020-04-12 NOTE — PROGRESS NOTE ADULT - SUBJECTIVE AND OBJECTIVE BOX
Hypoxemia    HPI:  A 43 y/o Female with PMHx significant for Hypothyroidism, Depression, presents today c/o SOB, nasal congestion, cough with clear sputum production, muscle aches and chills. Pt reports her symptoms started 3 days ago, she tried cough medicine but symptoms failed to improved. Also endorses fatigue, SOB and chest tightness that worsened today.   Patient denies any sick contact or recent travel history.   At the ED noted to desat to low 84' on RA, improved to 95% on 5-6L NC, Temp: 102.2; CXR:  Extensive bilateral airspace opacities may represent multifocal pneumonia (08 Apr 2020 02:47)    Interval History:  Patient was seen and examined at bedside. RN helped with interpretation.   Feeling better.    SOB and cough are improving. However, she is still on NRB.    Denies chest pain, palpitations, headache, dizziness, visual symptoms, nausea, vomiting, diarrhea or abdominal pain.    ROS:  As per interval history otherwise unremarkable.    PHYSICAL EXAM:  Vital Signs   T(C): 36.4 (12 Apr 2020 07:49), Max: 36.4 (12 Apr 2020 07:49)  T(F): 97.6 (12 Apr 2020 07:49), Max: 97.6 (12 Apr 2020 07:49)  HR: 94 (12 Apr 2020 07:49) (94 - 95)  BP: 99/70 (12 Apr 2020 07:49) (99/70 - 99/70)  RR: 19 (12 Apr 2020 07:49) (19 - 20)  SpO2: 94% (12 Apr 2020 07:49) (94% - 95%)  General: Well developed. Well nourished. No acute distress  HEENT: EOMI. Clear conjunctivae. Moist mucus membrane  Neck: Supple.   Chest: Good air entry. No wheezing, rales or rhonchi.   Heart: Normal S1 & S2. RRR.   Abdomen: Soft. Non-tender. Non-distended. + BS  Ext: No pedal edema. No calf tenderness   Neuro: AAO x 3. No focal deficit. No speech disorder  Skin: Warm and Dry  Psychiatry: Normal mood and affect    LABS:  Reviewed     RADIOLOGY & ADDITIONAL STUDIES:  Reviewed     MEDICATIONS  (STANDING):  ascorbic acid 1000 milliGRAM(s) Oral daily  benzonatate 100 milliGRAM(s) Oral three times a day  cholecalciferol 1000 Unit(s) Oral daily  enoxaparin Injectable 40 milliGRAM(s) SubCutaneous two times a day  hydroxychloroquine   Oral   hydroxychloroquine 200 milliGRAM(s) Oral every 12 hours  levothyroxine 100 MICROGram(s) Oral daily  methylPREDNISolone sodium succinate Injectable 80 milliGRAM(s) IV Push every 12 hours  pantoprazole    Tablet 40 milliGRAM(s) Oral before breakfast  PARoxetine 10 milliGRAM(s) Oral daily  thiamine 200 milliGRAM(s) Oral daily    MEDICATIONS  (PRN):  acetaminophen   Tablet .. 650 milliGRAM(s) Oral every 4 hours PRN Temp greater or equal to 38.5C (101.3F)  ALBUTerol    90 MICROgram(s) HFA Inhaler 2 Puff(s) Inhalation every 4 hours PRN Shortness of Breath and/or Wheezing  baclofen 10 milliGRAM(s) Oral every 8 hours PRN Muscle Spasm/aches  guaiFENesin   Syrup  (Sugar-Free) 100 milliGRAM(s) Oral every 6 hours PRN Cough  hydrocodone/homatropine Syrup 5 milliLiter(s) Oral every 6 hours PRN Cough

## 2020-04-13 NOTE — PROGRESS NOTE ADULT - ASSESSMENT
A 45 y/o Female with PMHx significant for Hypothyroidism, Depression, c/o SOB, nasal congestion, cough with clear sputum production, muscle aches and chills. Admitted for Acute Respiratory Failure with Hypoxia likely secondary to viral PNA    1) Acute Respiratory Failure with Hypoxia  - secondary to COVID-19 Pneumonia  - Finished Hydroxychloroquine  - Continue Solumedrol   - Tylenol, Albuterol and Tessalon Perle PRN  - Continue supplemental oxygen via NRB, wean off as tolerated (unable to wean down so far)  - Encourage for prone position every hour for 15 minutes  - Incentive spirometry    2) Hypothyroidism  - Continue Synthroid    3) Depression   - Continue Paroxetine    DVT Prophylaxis -- Lovenox 40 mg SQ BID    GOC: Full Code.     Dispo: Home in 3-4 days A 43 y/o Female with PMHx significant for Hypothyroidism, Depression, c/o SOB, nasal congestion, cough with clear sputum production, muscle aches and chills. Admitted for Acute Respiratory Failure with Hypoxia likely secondary to viral PNA    1) Acute Respiratory Failure with Hypoxia  - secondary to COVID-19 Pneumonia  - Finished Hydroxychloroquine  - Continue Solumedrol   - Tylenol, Albuterol and Tessalon Perle PRN  - Continue supplemental oxygen via NRB, wean off as tolerated (unable to wean down so far)  - Encourage for prone position every hour for 15 minutes  - Incentive spirometry    2) Elevated LFTs  - Likely secondary to viral infection    3) Hypothyroidism  - Continue Synthroid    4) Depression   - Continue Paroxetine    DVT Prophylaxis -- Lovenox 40 mg SQ BID    GOC: Full Code.     Dispo: Home in 3-4 days

## 2020-04-13 NOTE — DIETITIAN INITIAL EVALUATION ADULT. - PERTINENT LABORATORY DATA
04-12 Na137 mmol/L Glu 138 mg/dL<H> K+ 4.2 mmol/L Cr  0.81 mg/dL BUN 13.0 mg/dL Phos n/a   Alb 3.6 g/dL PAB n/a

## 2020-04-13 NOTE — DISCHARGE NOTE NURSING/CASE MANAGEMENT/SOCIAL WORK - NSDCCRNAME_GEN_ALL_CORE_FT
Dr Susana Tucker  from  the  [facility]  will  call and do  a phone visit  with you on 4/17 at 2:30 pm

## 2020-04-13 NOTE — PROGRESS NOTE ADULT - SUBJECTIVE AND OBJECTIVE BOX
Hypoxemia    HPI:  A 45 y/o Female with PMHx significant for Hypothyroidism, Depression, presents today c/o SOB, nasal congestion, cough with clear sputum production, muscle aches and chills. Pt reports her symptoms started 3 days ago, she tried cough medicine but symptoms failed to improved. Also endorses fatigue, SOB and chest tightness that worsened today.   Patient denies any sick contact or recent travel history.   At the ED noted to desat to low 84' on RA, improved to 95% on 5-6L NC, Temp: 102.2; CXR:  Extensive bilateral airspace opacities may represent multifocal pneumonia (08 Apr 2020 02:47)    Interval History:  Patient was seen and examined at bedside with .   Unable to wean down from NRB.   SOB and cough are improving.  Denies chest pain, palpitations, headache, dizziness, visual symptoms, nausea, vomiting, diarrhea or abdominal pain.    ROS:  As per interval history otherwise unremarkable.    PHYSICAL EXAM:  Vital Signs   T(C): 36.6 (13 Apr 2020 09:11), Max: 36.6 (13 Apr 2020 09:11)  T(F): 97.8 (13 Apr 2020 09:11), Max: 97.8 (13 Apr 2020 09:11)  HR: 90 (13 Apr 2020 09:11) (90 - 90)  BP: 108/86 (13 Apr 2020 09:11) (108/86 - 108/86)  RR: 18 (13 Apr 2020 09:11) (18 - 18)  SpO2: 90% (13 Apr 2020 09:11) (90% - 90%)  General: Well developed. Well nourished. No acute distress  HEENT: EOMI. Clear conjunctivae. Moist mucus membrane  Neck: Supple.   Chest: Good air entry. No wheezing, rales or rhonchi.   Heart: Normal S1 & S2. RRR.   Abdomen: Soft. Non-tender. Non-distended. + BS  Ext: No pedal edema. No calf tenderness   Neuro: AAO x 3. No focal deficit. No speech disorder  Skin: Warm and Dry  Psychiatry: Normal mood and affect    LABS:                        13.6   26.72 )-----------( 613      ( 12 Apr 2020 15:57 )             41.1     04-12    137  |  94<L>  |  13.0  ----------------------------<  138<H>  4.2   |  28.0  |  0.81    Ca    9.0      12 Apr 2020 15:57  Mg     2.4     04-12    TPro  7.0  /  Alb  3.6  /  TBili  0.6  /  DBili  x   /  AST  66<H>  /  ALT  37<H>  /  AlkPhos  105  04-12    RADIOLOGY & ADDITIONAL STUDIES:  Reviewed     MEDICATIONS  (STANDING):  ascorbic acid 1000 milliGRAM(s) Oral daily  benzonatate 100 milliGRAM(s) Oral three times a day  cholecalciferol 1000 Unit(s) Oral daily  enoxaparin Injectable 40 milliGRAM(s) SubCutaneous two times a day  levothyroxine 100 MICROGram(s) Oral daily  methylPREDNISolone sodium succinate Injectable 80 milliGRAM(s) IV Push every 12 hours  pantoprazole    Tablet 40 milliGRAM(s) Oral before breakfast  PARoxetine 10 milliGRAM(s) Oral daily  thiamine 200 milliGRAM(s) Oral daily    MEDICATIONS  (PRN):  acetaminophen   Tablet .. 650 milliGRAM(s) Oral every 4 hours PRN Temp greater or equal to 38.5C (101.3F)  ALBUTerol    90 MICROgram(s) HFA Inhaler 2 Puff(s) Inhalation every 4 hours PRN Shortness of Breath and/or Wheezing  baclofen 10 milliGRAM(s) Oral every 8 hours PRN Muscle Spasm/aches  guaiFENesin   Syrup  (Sugar-Free) 100 milliGRAM(s) Oral every 6 hours PRN Cough  hydrocodone/homatropine Syrup 5 milliLiter(s) Oral every 6 hours PRN Cough

## 2020-04-13 NOTE — DIETITIAN INITIAL EVALUATION ADULT. - OTHER INFO
45yo female admitted with hypoxemia, muscle aches, chills 2/2 COVID 19, hx hypothyroid, depression. Aware Pt requiring NRB at this time. Pt with good PO intake, completing 100% of meals per documentation.

## 2020-04-13 NOTE — DISCHARGE NOTE NURSING/CASE MANAGEMENT/SOCIAL WORK - PATIENT PORTAL LINK FT
You can access the FollowMyHealth Patient Portal offered by [facility] by registering at the following website: [email or web address]. By joining [facility]’s FollowMyHealth portal, you will also be able to view your health information using other applications (apps) compatible with our system.

## 2020-04-13 NOTE — DIETITIAN INITIAL EVALUATION ADULT. - REASON INDICATOR FOR ASSESSMENT
no peripheral edema/no orthopnea/no palpitations/no paroxysmal nocturnal dyspnea/no chest pain
COVID 19

## 2020-04-14 NOTE — CHART NOTE - NSCHARTNOTEFT_GEN_A_CORE
Called by RN to assess patient sating in the 80's. Upon arrival to bedside patient on NRB, 6L NC, and in prone position. Patient's head placed on pillow and NRB bag adjusted and SpO2 went up to 92% and patient now maintaining SpO2 >90%. Pt also noted to be tachypneic with increased WOB. 1mg Morphine ordered. ABG also ordered, however as patient is now maintaining saturation, nurse advised to call RT to obtain ABG if patient desaturates again and maintains in the 80's and to also call PA. RN advised to call if any changes. Will continue to monitor. Called by RN to assess patient sating in the 80's. Upon arrival to bedside patient on NRB, 6L NC, and in prone position. Patient's head placed on pillow and NRB bag adjusted and SpO2 went up to 92% and patient now maintaining SpO2 >90%. Pt also noted to be tachypneic with increased WOB. 1mg Morphine ordered. ABG also ordered, however as patient is now maintaining saturation, nurse advised to call RT to obtain ABG if patient desaturates again and maintains in the 80's and to also call PA. RN advised to call if any changes. Will continue to monitor.      Addendum @ 07:10    Called by RN for patient maintaining saturation in the 80's. Upon arrival to bedside patient with increased WOB and tachypnea. Patient now unable to maintain saturation despite oxygen supplementation, prone positioning, chest PT, and pursed lip breathing. Anesthesia and MICU PA consulted due to patient's declining respiratory status.   Case discussed with Dr. Bravo, Anesthesia, and MICU at bedside.   Patient's emergency contact, Tanika Lindsay, notified.

## 2020-04-14 NOTE — CHART NOTE - NSCHARTNOTEFT_GEN_A_CORE
Patient noted to be hypoxic to 82% on NRB and 6L O2 NC. She was in prone position, lung  PT done but no improvement. Patient informed the next step in management which will be intubation using PA for interpretation (patient speak Georgian). She agreed with the plan. Anesthesiology and ICU consulted. PA to update the family on patient's status.

## 2020-04-14 NOTE — PROCEDURE NOTE - ADDITIONAL PROCEDURE DETAILS
Dx: Acute hypoxic respiratory failure, ARDS    Procedure performed independent of CCT.
Dx: Acute hypoxic respiratory failure, ARDS    Procedure performed independent of CCT.

## 2020-04-14 NOTE — CONSULT NOTE ADULT - ASSESSMENT
45 yo f pmhx depression and hypothryoidism admitted 4/8 with covid19 respiratory failure.      NEURO: Sedated on propofol   CV: Close BP monitoring.   RESP: Hypoxic respiratory failure, ac/vc 6cc/kg tv lung protective strategy, actively titrating fio2 and peep for sp2o >88%, post intubation abg ordered.   RENAL: Monitor lytes, replace as needed  GI: NPO  ENDO: POCT q6hrs.  Levothyroxine for hypothyroidism.   ID:  completed plaquenil, azithomycin and ceftriaxone cousre.   HEME: Lovenox for ac  DISPO: Full code.     Critical Care time: 40 mins assessing presenting problems of acute illness that poses high probability of life threatening deterioration or end organ damage/dysfunction.  Medical decision making inculding Initiating plan of care, reviewing data, reviewing radiology, discussing with multidisciplinary team, non inclusive of procedures, discussing goals of care with patient/family

## 2020-04-14 NOTE — PROCEDURE NOTE - NSPROCDETAILS_GEN_ALL_CORE
patient pre-oxygenated, tube inserted, placement confirmed/connected to ventilator
ultrasound guidance/sterile technique, catheter placed/sterile dressing applied/guidewire recovered/lumen(s) aspirated and flushed
connected to a pressurized flush line/ultrasound guidance/sutured in place/location identified, draped/prepped, sterile technique used, needle inserted/introduced/all materials/supplies accounted for at end of procedure/hemostasis with direct pressure, dressing applied/positive blood return obtained via catheter/Seldinger technique

## 2020-04-14 NOTE — CONSULT NOTE ADULT - SUBJECTIVE AND OBJECTIVE BOX
Patient is a 44y old  Female who presents with a chief complaint of SOB (13 Apr 2020 12:28)      BRIEF HOSPITAL COURSE:   43 yo f pmhx depression and hypothryoidism admitted 4/8 with covid19 respiratory failure.      Events last 24 hours:   Consult called this evening due to patient desatting and becoming progressively more tachypneic.  Patient on NRB, ABG obtained 7.47/42/60/30/6.6.      Upon arrival to patient's room, patient supine with spo2 in the 80s, tachypneic with accessory muscle use.  Patient subsequently intubated, started on propofol for sedation, post intubation xray and abg ordered.  admit to micu, 3 tower.       PAST MEDICAL & SURGICAL HISTORY:  Depression  Hypothyroidism    Allergies  No Known Allergies      Review of Systems:  unable to obtain 2/2 intubated/sedated      Physical Examination:    General: young, obese female, lying in bed    HEENT: Pupils equal, reactive to light.  Symmetric.    PULM: Clear to auscultation bilaterally, no significant sputum production    CVS: Regular rate and rhythm, no murmurs, rubs, or gallops    ABD: Soft, nondistended, nontender, normoactive bowel sounds, no masses    EXT: No edema, nontender    SKIN: Warm and well perfused, no rashes noted.    NEURO: Alert, oriented, interactive, nonfocal      Medications:      ALBUTerol    90 MICROgram(s) HFA Inhaler 2 Puff(s) Inhalation every 4 hours PRN  benzonatate 100 milliGRAM(s) Oral three times a day  guaiFENesin   Syrup  (Sugar-Free) 100 milliGRAM(s) Oral every 6 hours PRN  hydrocodone/homatropine Syrup 5 milliLiter(s) Oral every 6 hours PRN    acetaminophen   Tablet .. 650 milliGRAM(s) Oral every 4 hours PRN  baclofen 10 milliGRAM(s) Oral every 8 hours PRN  PARoxetine 10 milliGRAM(s) Oral daily  propofol Infusion 20 MICROgram(s)/kG/Min IV Continuous <Continuous>      enoxaparin Injectable 40 milliGRAM(s) SubCutaneous two times a day    pantoprazole    Tablet 40 milliGRAM(s) Oral before breakfast      levothyroxine 100 MICROGram(s) Oral daily  methylPREDNISolone sodium succinate Injectable 80 milliGRAM(s) IV Push every 12 hours    ascorbic acid 1000 milliGRAM(s) Oral daily  cholecalciferol 1000 Unit(s) Oral daily  multivitamin 1 Tablet(s) Oral daily  thiamine 200 milliGRAM(s) Oral daily      chlorhexidine 0.12% Liquid 15 milliLiter(s) Oral Mucosa every 12 hours  chlorhexidine 4% Liquid 1 Application(s) Topical <User Schedule>            ICU Vital Signs Last 24 Hrs  T(C): 36.5 (13 Apr 2020 23:31), Max: 36.6 (13 Apr 2020 09:11)  T(F): 97.7 (13 Apr 2020 23:31), Max: 97.8 (13 Apr 2020 09:11)  HR: 88 (13 Apr 2020 23:31) (88 - 90)  BP: 136/92 (13 Apr 2020 23:31) (108/86 - 136/92)  BP(mean): --  ABP: --  ABP(mean): --  RR: 18 (13 Apr 2020 23:31) (18 - 18)  SpO2: 87% (14 Apr 2020 04:52) (87% - 90%)    Vital Signs Last 24 Hrs  T(C): 36.5 (13 Apr 2020 23:31), Max: 36.6 (13 Apr 2020 09:11)  T(F): 97.7 (13 Apr 2020 23:31), Max: 97.8 (13 Apr 2020 09:11)  HR: 88 (13 Apr 2020 23:31) (88 - 90)  BP: 136/92 (13 Apr 2020 23:31) (108/86 - 136/92)  BP(mean): --  RR: 18 (13 Apr 2020 23:31) (18 - 18)  SpO2: 87% (14 Apr 2020 04:52) (87% - 90%)    ABG - ( 14 Apr 2020 05:52 )  pH, Arterial: 7.47  pH, Blood: x     /  pCO2: 42    /  pO2: 60    / HCO3: 30    / Base Excess: 6.6   /  SaO2: 91                  I&O's Detail    12 Apr 2020 07:01  -  13 Apr 2020 07:00  --------------------------------------------------------  IN:  Total IN: 0 mL    OUT:    Voided: 500 mL  Total OUT: 500 mL    Total NET: -500 mL            LABS:                        13.6   26.72 )-----------( 613      ( 12 Apr 2020 15:57 )             41.1     04-12    137  |  94<L>  |  13.0  ----------------------------<  138<H>  4.2   |  28.0  |  0.81    Ca    9.0      12 Apr 2020 15:57  Mg     2.4     04-12    TPro  7.0  /  Alb  3.6  /  TBili  0.6  /  DBili  x   /  AST  66<H>  /  ALT  37<H>  /  AlkPhos  105  04-12          CAPILLARY BLOOD GLUCOSE            CULTURES:  Culture Results:   No growth at 5 days. (04-07 @ 20:52)        RADIOLOGY: ***      SUPPLEMENTAL O2:   LINES:  IVF:  BERTA:   PAMx:   CONTACT:

## 2020-04-15 NOTE — PROGRESS NOTE ADULT - SUBJECTIVE AND OBJECTIVE BOX
45 yo f pmhx depression and hypothryoidism admitted 4/8 with covid19 respiratory failure.      Pt was intubated yesterday, developed profound hypoxemia, ARDS.  This am upon exam pt is proned on ventilation on Fentanyl, Propofol, Versed, Rocuronium for sedation/paralyzation.  She is on Levophed gtt which is being titrated to maintain a MAP>65.  COmpleted Plaquenil and has received Actemra  Patient is a 44y old  Female who presents with a chief complaint of SOB (14 Apr 2020 06:39)      BRIEF HOSPITAL COURSE: 44y Female  as above    Events last 24 hours: Acute resp failure, ARDS, COVID PNA    PAST MEDICAL & SURGICAL HISTORY:  Depression  Hypothyroidism        Hosp day #7d    Vent day # 2Mode: AC/ CMV (Assist Control/ Continuous Mandatory Ventilation), RR (machine): 30, TV (machine): 350, FiO2: 70, PEEP: 13, ITime: 0.7, MAP: 20, PIP: 36  Mode: AC/ CMV (Assist Control/ Continuous Mandatory Ventilation)  RR (machine): 30  TV (machine): 350  FiO2: 70  PEEP: 13  ITime: 0.7  MAP: 20  PIP: 36        Vital signs / Reviewed and Physical Exam Performed where pertinent and urgently required  Pt proned  LS course with scattered crackles diffusely  CV +s1s2, Tachy  ABD no BS  ext positive edema all ext      T(C): 36.5 (04-15-20 @ 13:28), Max: 37.3 (04-14-20 @ 20:45)  HR: 69 (04-15-20 @ 16:23) (67 - 115)  BP: --  RR: 95 (04-15-20 @ 16:23) (21 - 98)  SpO2: 98% (04-15-20 @ 08:10) (85% - 100%)  04-14-20 @ 07:01  -  04-15-20 @ 07:00  --------------------------------------------------------  IN: 209.7 mL / OUT: 1330 mL / NET: -1120.3 mL    04-15-20 @ 07:01  -  04-15-20 @ 17:29  --------------------------------------------------------  IN: 0 mL / OUT: 350 mL / NET: -350 mL      Lab / Radiology  studies / ABG / Meds -  reviewed and interpreted into the assessment and treatment plan.  acetaminophen   Tablet .. 650 milliGRAM(s) Oral every 4 hours PRN  chlorhexidine 0.12% Liquid 15 milliLiter(s) Oral Mucosa every 12 hours  chlorhexidine 2% Cloths 1 Application(s) Topical daily  dexAMETHasone  Injectable 10 milliGRAM(s) IV Push daily  dextrose 40% Gel 15 Gram(s) Oral once PRN  dextrose 5%. 1000 milliLiter(s) IV Continuous <Continuous>  dextrose 50% Injectable 12.5 Gram(s) IV Push once  dextrose 50% Injectable 25 Gram(s) IV Push once  dextrose 50% Injectable 25 Gram(s) IV Push once  enoxaparin Injectable 80 milliGRAM(s) SubCutaneous two times a day  fentaNYL   Infusion 2 MICROgram(s)/kG/Hr IV Continuous <Continuous>  glucagon  Injectable 1 milliGRAM(s) IntraMuscular once PRN  insulin lispro (HumaLOG) corrective regimen sliding scale   SubCutaneous every 6 hours  levothyroxine Injectable 50 MICROGram(s) IV Push at bedtime  midazolam Infusion 0.035 mG/kG/Hr IV Continuous <Continuous>  norepinephrine Infusion 0.05 MICROgram(s)/kG/Min IV Continuous <Continuous>  pantoprazole  Injectable 40 milliGRAM(s) IV Push daily  PARoxetine 10 milliGRAM(s) Oral daily  propofol Infusion 20 MICROgram(s)/kG/Min IV Continuous <Continuous>  rocuronium Infusion 8 MICROgram(s)/kG/Min IV Continuous <Continuous>      Assessment/Plan/Therapeutic interventions  Problems:  1) Acute Hypoxic respiratory failure related to COVID-19 infection, Viral PNA with associatated ARDS  2) Acute renal failure   3) ICU Delirium     Neuro - Sedation neuromuscular blockade to facilitate safe ventilation/ Current Sedation strategy includes Propofol, Versed, Fentanyl gtts, Rocuronium paralytic    CV -  Pressor support as needed to maintain MAP 65 currenlty on Levophed           Avoiding fluid challenges          QTC monitoring while on Azithromycin and Hydroxychloroquine.    Pulm -  ARDS-NET 4-6cc/kg IBW TV as able to maintain plateau pressures <30               Prone ventilation consideration as feasible  Pa02/Fi02 < 150 on Fi02 >60% and PEEP at least 5                Current FIO2/PEEP requirement is 60/13               Decadron added due to elevated Peak/Plataeu pressures                 Vent bundle Reviewed     GI -  PPI  Enteric feeds as tolerated in tandem with NMB and prone ventilation    Renal - Even to negative fluid balance as tolerated by hemodynamics and renal fx.  Feeds to be provided in lieu of IVF.                **** Currently Off Diuretics    Heme -  Pharmacologic DVT PPx  in addition to SCD's, Full dose of Lovenox being administered given prothrombotic nature of COVID infection    ID - ABX discontinuation based on discussion with ID in conjunction with clinical features, culture data, and judicious procalcitonin monitoring.    Completed course of Plaquenil/Azithromyzin    Endo -  Aggressive glycemic control to limit FS glucose to < 180mg/dl. ***Currenlty requiring Insulin Drip         COVID 19 specific considerations and therapeutic  options based on the available and rapidly changing literature    Goals of care considerations:  Ongoing assessment for patient specific treatment options based on progression or decline.  I have involved the family with updates and requests in guidance for medical decision making.          42  Minutes of critical care time spent in the management of this critically ill COVID-19 patient/PUI patient with continuous assessments and interventions based on the interpretation of multiple databases.

## 2020-04-16 NOTE — PROGRESS NOTE ADULT - SUBJECTIVE AND OBJECTIVE BOX
Patient is a 44y old  Female who presents with a chief complaint of SOB (15 Apr 2020 17:24)      BRIEF HOSPITAL COURSE: 44y Female  ***    Events last 24 hours: ***    PAST MEDICAL & SURGICAL HISTORY:  Depression  Hypothyroidism        Hosp day #8d    Vent day #Mode: AC/ CMV (Assist Control/ Continuous Mandatory Ventilation), RR (machine): 30, TV (machine): 350, FiO2: 60, PEEP: 10, ITime: 0.7, MAP: 17, PIP: 34  Mode: AC/ CMV (Assist Control/ Continuous Mandatory Ventilation)  RR (machine): 30  TV (machine): 350  FiO2: 60  PEEP: 10  ITime: 0.7  MAP: 17  PIP: 34        Vital signs / Reviewed and Physical Exam Performed where pertinent and urgently required      T(C): 38.1 (04-16-20 @ 08:30), Max: 38.1 (04-16-20 @ 08:30)  HR: 99 (04-16-20 @ 10:00) (69 - 152)  BP: --  RR: 30 (04-16-20 @ 10:00) (28 - 97)  SpO2: 92% (04-16-20 @ 10:00) (89% - 98%)  04-15-20 @ 07:01  -  04-16-20 @ 07:00  --------------------------------------------------------  IN: 2436.9 mL / OUT: 775 mL / NET: 1661.9 mL    04-16-20 @ 07:01  -  04-16-20 @ 10:26  --------------------------------------------------------  IN: 191 mL / OUT: 980 mL / NET: -789 mL      Lab / Radiology  studies / ABG / Meds -  reviewed and interpreted into the assessment and treatment plan.  acetaminophen   Tablet .. 650 milliGRAM(s) Oral every 4 hours PRN  chlorhexidine 0.12% Liquid 15 milliLiter(s) Oral Mucosa every 12 hours  chlorhexidine 2% Cloths 1 Application(s) Topical daily  dexAMETHasone  Injectable 10 milliGRAM(s) IV Push daily  dexMEDEtomidine Infusion 0.7 MICROgram(s)/kG/Hr IV Continuous <Continuous>  dextrose 40% Gel 15 Gram(s) Oral once PRN  dextrose 5%. 1000 milliLiter(s) IV Continuous <Continuous>  dextrose 50% Injectable 12.5 Gram(s) IV Push once  dextrose 50% Injectable 25 Gram(s) IV Push once  dextrose 50% Injectable 25 Gram(s) IV Push once  enoxaparin Injectable 80 milliGRAM(s) SubCutaneous two times a day  glucagon  Injectable 1 milliGRAM(s) IntraMuscular once PRN  HYDROmorphone Infusion. 1 mG/Hr IV Continuous <Continuous>  insulin lispro (HumaLOG) corrective regimen sliding scale   SubCutaneous every 6 hours  levothyroxine Injectable 50 MICROGram(s) IV Push at bedtime  midazolam Infusion 0.035 mG/kG/Hr IV Continuous <Continuous>  norepinephrine Infusion 0.05 MICROgram(s)/kG/Min IV Continuous <Continuous>  pantoprazole  Injectable 40 milliGRAM(s) IV Push daily  PARoxetine 10 milliGRAM(s) Oral daily  rocuronium Infusion 8 MICROgram(s)/kG/Min IV Continuous <Continuous>      Assessment/Plan/Therapeutic interventions  Problems:  1) Acute Hypoxic respiratory failure related to COVID-19 infection, Viral PNA with associated ARDS  2) Acute renal failure   3) ICU Delirium     Neuro - Sedation neuromuscular blockade to facilitate safe ventilation/ Current Sedation strategy includes***    CV -  Pressor support as needed to maintain MAP 65           Avoiding fluid challenges          QTC monitoring while on Azithromycin and Hydroxychloroquine.    Pulm -  ARDS-NET 4-6cc/kg IBW TV as able to maintain plateau pressures <30               Prone ventilation consideration as feasible  Pa02/Fi02 < 150 on Fi02 >60% and PEEP at least 5                Current FIO2/PEEP requirement is***               Steroids added for enhanced antiinflammatory effect                Vent bundle Reviewed     GI -  PPI  Enteric feeds as tolerated in tandem with NMB and prone ventilation    Renal - Even to negative fluid balance as tolerated by hemodynamics and renal fx.  Feeds to be provided in lieu of IVF.                **** Currently On/Off Diuretics    Heme -  Pharmacologic DVT PPx  in addition to SCD's, Full dose being administered given enhanced  prothrombotic nature of COVID infection    ID - ABX discontinuation based on discussion with ID in conjunction with clinical features, culture data, and judicious procalcitonin monitoring.   ***Remains on Plaquenil or Completed course of Plaquenil    Endo -  Aggressive glycemic control to limit FS glucose to < 180mg/dl. ***Currenlty requiring Insulin Drip         COVID 19 specific considerations and therapeutic  options based on the available and rapidly changing literature    Goals of care considerations:  Ongoing assessment for patient specific treatment options based on progression or decline.  I have involved the family with updates and requests in guidance for medical decision making.          42  Minutes of critical care time spent in the management of this critically ill COVID-19 patient/PUI patient with continuous assessments and interventions based on the interpretation of multiple databases. Patient is a 44y old  Female who presents with a chief complaint of SOB (15 Apr 2020 17:24)      BRIEF HOSPITAL COURSE: 45 yo f pmhx depression and hypothryoidism admitted 4/8 with covid19 respiratory failure. Developed profound hypoxemia, andrespiratory failure, requiring intubation, paralysis and was proned.   COmpleted Plaquenil and has received Actemra    Events last 24 hours: This morning remains paralyzed, but is supine now. Sedation includes Dilaudid and Versed infusion. FIO2/PEEP 65/+10    PAST MEDICAL & SURGICAL HISTORY:  Depression  Hypothyroidism        Hosp day #8d    Vent day #Mode: AC/ CMV (Assist Control/ Continuous Mandatory Ventilation), RR (machine): 30, TV (machine): 350, FiO2: 60, PEEP: 10, ITime: 0.7, MAP: 17, PIP: 34  Mode: AC/ CMV (Assist Control/ Continuous Mandatory Ventilation)  RR (machine): 30  TV (machine): 350  FiO2: 60  PEEP: 10  ITime: 0.7  MAP: 17  PIP: 34        Vital signs / Reviewed and Physical Exam Performed where pertinent and urgently required      T(C): 38.1 (04-16-20 @ 08:30), Max: 38.1 (04-16-20 @ 08:30)  HR: 99 (04-16-20 @ 10:00) (69 - 152)  BP: --  RR: 30 (04-16-20 @ 10:00) (28 - 97)  SpO2: 92% (04-16-20 @ 10:00) (89% - 98%)  04-15-20 @ 07:01  -  04-16-20 @ 07:00  --------------------------------------------------------  IN: 2436.9 mL / OUT: 775 mL / NET: 1661.9 mL    04-16-20 @ 07:01  -  04-16-20 @ 10:26  --------------------------------------------------------  IN: 191 mL / OUT: 980 mL / NET: -789 mL      Lab / Radiology  studies / ABG / Meds -  reviewed and interpreted into the assessment and treatment plan.  acetaminophen   Tablet .. 650 milliGRAM(s) Oral every 4 hours PRN  chlorhexidine 0.12% Liquid 15 milliLiter(s) Oral Mucosa every 12 hours  chlorhexidine 2% Cloths 1 Application(s) Topical daily  dexAMETHasone  Injectable 10 milliGRAM(s) IV Push daily  dexMEDEtomidine Infusion 0.7 MICROgram(s)/kG/Hr IV Continuous <Continuous>  dextrose 40% Gel 15 Gram(s) Oral once PRN  dextrose 5%. 1000 milliLiter(s) IV Continuous <Continuous>  dextrose 50% Injectable 12.5 Gram(s) IV Push once  dextrose 50% Injectable 25 Gram(s) IV Push once  dextrose 50% Injectable 25 Gram(s) IV Push once  enoxaparin Injectable 80 milliGRAM(s) SubCutaneous two times a day  glucagon  Injectable 1 milliGRAM(s) IntraMuscular once PRN  HYDROmorphone Infusion. 1 mG/Hr IV Continuous <Continuous>  insulin lispro (HumaLOG) corrective regimen sliding scale   SubCutaneous every 6 hours  levothyroxine Injectable 50 MICROGram(s) IV Push at bedtime  midazolam Infusion 0.035 mG/kG/Hr IV Continuous <Continuous>  norepinephrine Infusion 0.05 MICROgram(s)/kG/Min IV Continuous <Continuous>  pantoprazole  Injectable 40 milliGRAM(s) IV Push daily  PARoxetine 10 milliGRAM(s) Oral daily  rocuronium Infusion 8 MICROgram(s)/kG/Min IV Continuous <Continuous>      Assessment/Plan/Therapeutic interventions  Problems:  1) Acute Hypoxic respiratory failure related to COVID-19 infection, Viral PNA with associated ARDS      Neuro - Sedation neuromuscular blockade to facilitate safe ventilation/ Current Sedation strategy includes Dilaudid and Versed infusions in conjunction with NMB     CV -  Pressor support as needed to maintain MAP 65           Avoiding fluid challenges            Pulm -  ARDS-NET 4-6cc/kg IBW TV as able to maintain plateau pressures <30               Prone ventilation consideration as feasible  Pa02/Fi02 < 150 on Fi02 >60% and PEEP at least 5                Current FIO2/PEEP requirement is 65/+10              Will complete Decadron course. Await CTA to exclude PE. Already fully anticoagulated                 Vent bundle Reviewed     GI -  PPI  Enteric feeds as tolerated in tandem with NMB and prone ventilation. CUrrenlty of TFs. If can tolerate removal of NMB today, will resume enteal feeds    Renal - Even to negative fluid balance as tolerated by hemodynamics and renal fx.                 Currently Off Diuretics    Heme -  Pharmacologic DVT PPx  in addition to SCD's, Full dose being administered given enhanced  prothrombotic nature of COVID infection    ID - ABX discontinuation based on discussion with ID in conjunction with clinical features, culture data, and judicious procalcitonin monitoring.    Completed course of Plaquenil    Endo -  Aggressive glycemic control to limit FS glucose to < 180mg/dl.         COVID 19 specific considerations and therapeutic  options based on the available and rapidly changing literature    Goals of care considerations:  Ongoing assessment for patient specific treatment options based on progression or decline.  I have involved the family with updates and requests in guidance for medical decision making.          42  Minutes of critical care time spent in the management of this critically ill COVID-19 patient/PUI patient with continuous assessments and interventions based on the interpretation of multiple databases.

## 2020-04-17 NOTE — PROGRESS NOTE ADULT - SUBJECTIVE AND OBJECTIVE BOX
Patient is a 44y old  Female who presents with a chief complaint of SOB (16 Apr 2020 10:26)      BRIEF HOSPITAL COURSE: 44y Female  ***    Events last 24 hours: ***    PAST MEDICAL & SURGICAL HISTORY:  Depression  Hypothyroidism        Hosp day #9d    Vent day #Mode: AC/ CMV (Assist Control/ Continuous Mandatory Ventilation), RR (machine): 30, TV (machine): 350, FiO2: 65, PEEP: 10, ITime: 0.7, MAP: 17, PIP: 35  Mode: AC/ CMV (Assist Control/ Continuous Mandatory Ventilation)  RR (machine): 30  TV (machine): 350  FiO2: 65  PEEP: 10  ITime: 0.7  MAP: 17  PIP: 35        Vital signs / Reviewed and Physical Exam Performed where pertinent and urgently required      T(C): 37.1 (04-17-20 @ 12:41), Max: 37.7 (04-16-20 @ 18:00)  HR: 91 (04-17-20 @ 12:00) (79 - 106)  BP: --  RR: 30 (04-17-20 @ 12:00) (27 - 30)  SpO2: 95% (04-17-20 @ 12:00) (90% - 98%)  04-16-20 @ 07:01  -  04-17-20 @ 07:00  --------------------------------------------------------  IN: 1272.7 mL / OUT: 1665 mL / NET: -392.3 mL    04-17-20 @ 07:01  -  04-17-20 @ 13:04  --------------------------------------------------------  IN: 342.2 mL / OUT: 475 mL / NET: -132.8 mL      Lab / Radiology  studies / ABG / Meds -  reviewed and interpreted into the assessment and treatment plan.  acetaminophen   Tablet .. 650 milliGRAM(s) Oral every 4 hours PRN  chlorhexidine 0.12% Liquid 15 milliLiter(s) Oral Mucosa every 12 hours  chlorhexidine 2% Cloths 1 Application(s) Topical daily  dextrose 40% Gel 15 Gram(s) Oral once PRN  dextrose 5%. 1000 milliLiter(s) IV Continuous <Continuous>  dextrose 50% Injectable 12.5 Gram(s) IV Push once  dextrose 50% Injectable 25 Gram(s) IV Push once  dextrose 50% Injectable 25 Gram(s) IV Push once  enoxaparin Injectable 80 milliGRAM(s) SubCutaneous two times a day  glucagon  Injectable 1 milliGRAM(s) IntraMuscular once PRN  HYDROmorphone Infusion. 1 mG/Hr IV Continuous <Continuous>  insulin lispro (HumaLOG) corrective regimen sliding scale   SubCutaneous every 6 hours  levothyroxine Injectable 50 MICROGram(s) IV Push at bedtime  midazolam Infusion 0.035 mG/kG/Hr IV Continuous <Continuous>  midodrine 10 milliGRAM(s) Oral every 8 hours  norepinephrine Infusion 0.05 MICROgram(s)/kG/Min IV Continuous <Continuous>  pantoprazole  Injectable 40 milliGRAM(s) IV Push daily  PARoxetine 10 milliGRAM(s) Oral daily  rocuronium Infusion 8 MICROgram(s)/kG/Min IV Continuous <Continuous>      Assessment/Plan/Therapeutic interventions  Problems:  1) Acute Hypoxic respiratory failure related to COVID-19 infection, Viral PNA with associated ARDS  2) Acute renal failure   3) ICU Delirium     Neuro - Sedation neuromuscular blockade to facilitate safe ventilation/ Current Sedation strategy includes***    CV -  Pressor support as needed to maintain MAP 65           Avoiding fluid challenges          QTC monitoring while on Azithromycin and Hydroxychloroquine.    Pulm -  ARDS-NET 4-6cc/kg IBW TV as able to maintain plateau pressures <30               Prone ventilation consideration as feasible  Pa02/Fi02 < 150 on Fi02 >60% and PEEP at least 5                Current FIO2/PEEP requirement is***               Steroids added for enhanced antiinflammatory effect                Vent bundle Reviewed     GI -  PPI  Enteric feeds as tolerated in tandem with NMB and prone ventilation    Renal - Even to negative fluid balance as tolerated by hemodynamics and renal fx.  Feeds to be provided in lieu of IVF.                **** Currently On/Off Diuretics    Heme -  Pharmacologic DVT PPx  in addition to SCD's, Full dose being administered given enhanced  prothrombotic nature of COVID infection    ID - ABX discontinuation based on discussion with ID in conjunction with clinical features, culture data, and judicious procalcitonin monitoring.   ***Remains on Plaquenil or Completed course of Plaquenil    Endo -  Aggressive glycemic control to limit FS glucose to < 180mg/dl. ***Currenlty requiring Insulin Drip         COVID 19 specific considerations and therapeutic  options based on the available and rapidly changing literature    Goals of care considerations:  Ongoing assessment for patient specific treatment options based on progression or decline.  I have involved the family with updates and requests in guidance for medical decision making.          42  Minutes of critical care time spent in the management of this critically ill COVID-19 patient/PUI patient with continuous assessments and interventions based on the interpretation of multiple databases. Patient is a 44y old  Female who presents with a chief complaint of SOB (16 Apr 2020 10:26)      BRIEF HOSPITAL COURSE: 45 yo f pmhx depression and hypothryoidism admitted 4/8 with covid19 respiratory failure. Developed profound hypoxemia, andrespiratory failure, requiring intubation, paralysis and was proned.   COmpleted Plaquenil and has received Actemra    Events last 24 hours: This morning remains paralyzed, proned again last night.  Sedation includes Dilaudid and Versed infusion. FIO2/PEEP 65/+10  Now Supine. Remains quite Hypoxic with P:F 86    PAST MEDICAL & SURGICAL HISTORY:  Depression  Hypothyroidism        Hosp day #9d    Vent day #Mode: AC/ CMV (Assist Control/ Continuous Mandatory Ventilation), RR (machine): 30, TV (machine): 350, FiO2: 65, PEEP: 10, ITime: 0.7, MAP: 17, PIP: 35  Mode: AC/ CMV (Assist Control/ Continuous Mandatory Ventilation)  RR (machine): 30  TV (machine): 350  FiO2: 65  PEEP: 10  ITime: 0.7  MAP: 17  PIP: 35        Vital signs / Reviewed and Physical Exam Performed where pertinent and urgently required      T(C): 37.1 (04-17-20 @ 12:41), Max: 37.7 (04-16-20 @ 18:00)  HR: 91 (04-17-20 @ 12:00) (79 - 106)  BP: --  RR: 30 (04-17-20 @ 12:00) (27 - 30)  SpO2: 95% (04-17-20 @ 12:00) (90% - 98%)  04-16-20 @ 07:01  -  04-17-20 @ 07:00  --------------------------------------------------------  IN: 1272.7 mL / OUT: 1665 mL / NET: -392.3 mL    04-17-20 @ 07:01  -  04-17-20 @ 13:04  --------------------------------------------------------  IN: 342.2 mL / OUT: 475 mL / NET: -132.8 mL      Lab / Radiology  studies / ABG / Meds -  reviewed and interpreted into the assessment and treatment plan.  acetaminophen   Tablet .. 650 milliGRAM(s) Oral every 4 hours PRN  chlorhexidine 0.12% Liquid 15 milliLiter(s) Oral Mucosa every 12 hours  chlorhexidine 2% Cloths 1 Application(s) Topical daily  dextrose 40% Gel 15 Gram(s) Oral once PRN  dextrose 5%. 1000 milliLiter(s) IV Continuous <Continuous>  dextrose 50% Injectable 12.5 Gram(s) IV Push once  dextrose 50% Injectable 25 Gram(s) IV Push once  dextrose 50% Injectable 25 Gram(s) IV Push once  enoxaparin Injectable 80 milliGRAM(s) SubCutaneous two times a day  glucagon  Injectable 1 milliGRAM(s) IntraMuscular once PRN  HYDROmorphone Infusion. 1 mG/Hr IV Continuous <Continuous>  insulin lispro (HumaLOG) corrective regimen sliding scale   SubCutaneous every 6 hours  levothyroxine Injectable 50 MICROGram(s) IV Push at bedtime  midazolam Infusion 0.035 mG/kG/Hr IV Continuous <Continuous>  midodrine 10 milliGRAM(s) Oral every 8 hours  norepinephrine Infusion 0.05 MICROgram(s)/kG/Min IV Continuous <Continuous>  pantoprazole  Injectable 40 milliGRAM(s) IV Push daily  PARoxetine 10 milliGRAM(s) Oral daily  rocuronium Infusion 8 MICROgram(s)/kG/Min IV Continuous <Continuous>      Assessment/Plan/Therapeutic interventions  Problems:  1) Acute Hypoxic respiratory failure related to COVID-19 infection, Viral PNA with associated ARDS    Neuro - Sedation neuromuscular blockade to facilitate safe ventilation/ Current Sedation strategy includes Dilaudid and Versed infusions in conjunction with NMB     CV -  Pressor support as needed to maintain MAP 65           Avoiding fluid challenges          Pulm -  ARDS-NET 4-6cc/kg IBW TV as able to maintain plateau pressures <30               Prone ventilation consideration as feasible  Pa02/Fi02 < 150 on Fi02 >60% and PEEP at least 5                Current FIO2/PEEP requirement is 65/+10              Will complete Decadron course. CTA negative for PE, but showed collapse of posterior portion of lobes.                 Vent bundle Reviewed     GI -  PPI  Enteric feeds as tolerated in tandem with NMB and prone ventilation. CUrrenlty of TFs. If can tolerate removal of NMB today, will resume enteal feeds    Renal - Even to negative fluid balance as tolerated by hemodynamics and renal fx.                 Currently Off Diuretics    Heme -  Pharmacologic DVT PPx  in addition to SCD's, Full dose being administered given enhanced  prothrombotic nature of COVID infection    ID - ABX discontinuation based on discussion with ID in conjunction with clinical features, culture data, and judicious procalcitonin monitoring.    Completed course of Plaquenil    Endo -  Aggressive glycemic control to limit FS glucose to < 180mg/dl.         COVID 19 specific considerations and therapeutic  options based on the available and rapidly changing literature    Goals of care considerations:  Ongoing assessment for patient specific treatment options based on progression or decline.  I have involved the family with updates and requests in guidance for medical decision making.          40 Minutes of critical care time spent in the management of this critically ill COVID-19 patient/PUI patient with continuous assessments and interventions based on the interpretation of multiple databases. Patient is a 44y old  Female who presents with a chief complaint of SOB (16 Apr 2020 10:26)      BRIEF HOSPITAL COURSE: 45 yo f pmhx depression and hypothryoidism admitted 4/8 with covid19 respiratory failure. Developed profound hypoxemia, andrespiratory failure, requiring intubation, paralysis and was proned.   COmpleted Plaquenil and has received Actemra    Events last 24 hours: This morning remains paralyzed, proned again last night.  Sedation includes Dilaudid and Versed infusion. FIO2/PEEP 65/+10  Now Supine. Remains quite Hypoxic with P:F 86    PAST MEDICAL & SURGICAL HISTORY:  Depression  Hypothyroidism        Hosp day #9d    Vent day #Mode: AC/ CMV (Assist Control/ Continuous Mandatory Ventilation), RR (machine): 30, TV (machine): 350, FiO2: 65, PEEP: 10, ITime: 0.7, MAP: 17, PIP: 35  Mode: AC/ CMV (Assist Control/ Continuous Mandatory Ventilation)  RR (machine): 30  TV (machine): 350  FiO2: 65  PEEP: 10  ITime: 0.7  MAP: 17  PIP: 35        Vital signs / Reviewed and Physical Exam Performed where pertinent and urgently required      T(C): 37.1 (04-17-20 @ 12:41), Max: 37.7 (04-16-20 @ 18:00)  HR: 91 (04-17-20 @ 12:00) (79 - 106)  BP: --  RR: 30 (04-17-20 @ 12:00) (27 - 30)  SpO2: 95% (04-17-20 @ 12:00) (90% - 98%)  04-16-20 @ 07:01  -  04-17-20 @ 07:00  --------------------------------------------------------  IN: 1272.7 mL / OUT: 1665 mL / NET: -392.3 mL    04-17-20 @ 07:01  -  04-17-20 @ 13:04  --------------------------------------------------------  IN: 342.2 mL / OUT: 475 mL / NET: -132.8 mL      Lab / Radiology  studies / ABG / Meds -  reviewed and interpreted into the assessment and treatment plan.  acetaminophen   Tablet .. 650 milliGRAM(s) Oral every 4 hours PRN  chlorhexidine 0.12% Liquid 15 milliLiter(s) Oral Mucosa every 12 hours  chlorhexidine 2% Cloths 1 Application(s) Topical daily  dextrose 40% Gel 15 Gram(s) Oral once PRN  dextrose 5%. 1000 milliLiter(s) IV Continuous <Continuous>  dextrose 50% Injectable 12.5 Gram(s) IV Push once  dextrose 50% Injectable 25 Gram(s) IV Push once  dextrose 50% Injectable 25 Gram(s) IV Push once  enoxaparin Injectable 80 milliGRAM(s) SubCutaneous two times a day  glucagon  Injectable 1 milliGRAM(s) IntraMuscular once PRN  HYDROmorphone Infusion. 1 mG/Hr IV Continuous <Continuous>  insulin lispro (HumaLOG) corrective regimen sliding scale   SubCutaneous every 6 hours  levothyroxine Injectable 50 MICROGram(s) IV Push at bedtime  midazolam Infusion 0.035 mG/kG/Hr IV Continuous <Continuous>  midodrine 10 milliGRAM(s) Oral every 8 hours  norepinephrine Infusion 0.05 MICROgram(s)/kG/Min IV Continuous <Continuous>  pantoprazole  Injectable 40 milliGRAM(s) IV Push daily  PARoxetine 10 milliGRAM(s) Oral daily  rocuronium Infusion 8 MICROgram(s)/kG/Min IV Continuous <Continuous>      Assessment/Plan/Therapeutic interventions  Problems:  1) Acute Hypoxic respiratory failure related to COVID-19 infection, Viral PNA with associated ARDS    Neuro - Sedation neuromuscular blockade to facilitate safe ventilation/ Current Sedation strategy includes Dilaudid and Versed infusions in conjunction with NMB     CV -  Pressor support as needed to maintain MAP 65           Avoiding fluid challenges          Pulm -  ARDS-NET 4-6cc/kg IBW TV as able to maintain plateau pressures <30               Prone ventilation consideration as feasible  Pa02/Fi02 < 150 on Fi02 >60% and PEEP at least 5                Current FIO2/PEEP requirement is 65/+10              Will complete Decadron course. CTA negative for PE, but showed collapse of posterior portion of lobes.                 Vent bundle Reviewed     GI -  PPI  Enteric feeds as tolerated in tandem with NMB and prone ventilation. CUrrenlty of TFs. If can tolerate removal of NMB today, will resume enteal feeds    Renal - Even to negative fluid balance as tolerated by hemodynamics and renal fx.                 Currently Off Diuretics    Heme -  Pharmacologic DVT PPx  in addition to SCD's, Full dose being administered given enhanced  prothrombotic nature of COVID infection    ID - ABX discontinuation based on discussion with ID in conjunction with clinical features, culture data, and judicious procalcitonin monitoring.    Completed course of Plaquenil    Endo -  Aggressive glycemic control to limit FS glucose to < 180mg/dl.         COVID 19 specific considerations and therapeutic  options based on the available and rapidly changing literature    Goals of care considerations:  Ongoing assessment for patient specific treatment options based on progression or decline.  I have involved the family with updates and requests in guidance for medical decision making.          40 Minutes of critical care time spent in the management of this critically ill COVID-19 patient  with continuous assessments and interventions based on the interpretation of multiple databases.

## 2020-04-18 NOTE — PROGRESS NOTE ADULT - SUBJECTIVE AND OBJECTIVE BOX
HPI: 43 yo female, PMHx obesity, depression, hypothyroidism, found to be COVID-19 positive, admitted with respiratory insufficiency, developed acute hypoxemic respiratory failure, ARDS, secondary to severe sepsis viral COVID 19 pneumonia.      Hosp day #11  Vent day #5  p:F ratio = 100    Severe ARDS  Prone positioned at 2200  Sedated on Versed and Dilaudid, and Paralyzed with Rocuronium  Some improvement in oxygenation with prone positioning, titrated from 75% FiO2 --> 55%, PEEP +12  Oliguric          Vital signs reviewed and physical exam performed where pertinent and urgently required.    Lab/radiology studies/ABG/meds reviewed and interpreted into the assessment and treatment plan.        Assessment/Plan/Therapeutic interventions:    Neuro: Deep sedation and neuromuscular blockade to facilitate ventilation and improve compliance to optimize oxygenation    CV: Monitoring end points of perfusion.    Pulm: Low Tv lung protective strategy 4-8 cc/kg IBW, with high PEEP, low FiO2 strategy per ARDSnet guidelines. Manipulating vent to maintain paO2 55-80 and pH >7.15 with permissive hypercapnea. Prone ventilation consideration.    GI: PPI for GI ppx. Enteric feeds as tolerated in tandem with NMB and prone positioning.    Renal: Oliguric with prerenal azotemia, UOP 100cc/shift thus far. Monitoring renal indices and fluid status, diuresing as needed to goal euvolemic to net negative fluid balance as tolerated by hemodynamics.    Heme: Hypercoagulable state due to COVID19 infection, on full dose Lovenox for VTE ppx    ID: s/p Actemra. Judicious use of antibiotics based on biomarkers and culture data, trending fever curve.     Endo: Aggressive glycemic control to keep FS glucose to <180mg/dl while critically ill.         COVID 19 specific considerations and therapeutic options based on the available and rapidly changing literature.    42 minutes of critical care time spent in the management of this critically ill COVID-19 patient/PUI patient suffering from multisystem organ failure with continuous assessments and interventions based on the interpretation of multiple databases. Critical care time not inclusive of independent time spent on procedures performed.

## 2020-04-19 NOTE — PROGRESS NOTE ADULT - SUBJECTIVE AND OBJECTIVE BOX
HPI: 43 yo female, PMHx obesity, depression, hypothyroidism, found to be COVID-19 positive, admitted with respiratory insufficiency, developed acute hypoxemic respiratory failure, ARDS, secondary to severe sepsis viral COVID 19 pneumonia.      Hosp day #12  Vent day #6  p:F ratio = 83    Severe ARDS  Prone positioned at 2200  Sedated on Versed and Dilaudid, and Paralyzed with Rocuronium  Minimal improvement in oxygenation with prone positioning, titrated from 80% FiO2 --> 55%, PEEP +12  Distributive shock on Levophed    Oliguric          Vital signs reviewed and physical exam performed where pertinent and urgently required.    Lab/radiology studies/ABG/meds reviewed and interpreted into the assessment and treatment plan.        Assessment/Plan/Therapeutic interventions:    Neuro: Deep sedation and neuromuscular blockade to facilitate ventilation and improve compliance to optimize oxygenation    CV: Monitoring end points of perfusion.    Pulm: Low Tv lung protective strategy 4-8 cc/kg IBW, with high PEEP, low FiO2 strategy per ARDSnet guidelines. Manipulating vent to maintain paO2 55-80 and pH >7.15 with permissive hypercapnea. Prone ventilation consideration.    GI: PPI for GI ppx. Enteric feeds as tolerated in tandem with NMB and prone positioning.    Renal: Oliguric with prerenal azotemia, UOP 100cc/shift thus far. Monitoring renal indices and fluid status, diuresing as needed to goal euvolemic to net negative fluid balance as tolerated by hemodynamics.    Heme: Hypercoagulable state due to COVID19 infection, on full dose Lovenox for VTE ppx    ID: s/p Actemra. Judicious use of antibiotics based on biomarkers and culture data, trending fever curve.     Endo: Aggressive glycemic control to keep FS glucose to <180mg/dl while critically ill.         COVID 19 specific considerations and therapeutic options based on the available and rapidly changing literature.    42 minutes of critical care time spent in the management of this critically ill COVID-19 patient/PUI patient suffering from multisystem organ failure with continuous assessments and interventions based on the interpretation of multiple databases. Critical care time not inclusive of independent time spent on procedures performed. HPI: 43 yo female, PMHx obesity, depression, hypothyroidism, found to be COVID-19 positive, admitted with respiratory insufficiency, developed acute hypoxemic respiratory failure, ARDS, secondary to severe sepsis viral COVID 19 pneumonia.      Hosp day #12  Vent day #6  p:F ratio = 83    Severe ARDS  Prone positioned at 2200  Sedated on Versed and Dilaudid, and Paralyzed with Rocuronium  Oxygenation again worsened overnight, high vent requirements with minimal weaning today, FiO2 80 --> 70%/+14  Distributive shock on Levophed        Vital signs reviewed and physical exam performed where pertinent and urgently required.    Lab/radiology studies/ABG/meds reviewed and interpreted into the assessment and treatment plan.        Assessment/Plan/Therapeutic interventions:    Neuro: Deep sedation and neuromuscular blockade to facilitate ventilation and improve compliance to optimize oxygenation    CV: Developed shock overnight started on Norepinephrine, titrating to maintain MAP >65. Monitoring end points of perfusion.    Pulm: Low Tv lung protective strategy 4-8 cc/kg IBW, with high PEEP, low FiO2 strategy per ARDSnet guidelines. Manipulating vent to maintain paO2 55-80 and pH >7.15 with permissive hypercapnea. Prone ventilation strategy employed again, will place supine at 1400 and repeat ABG post-supination.    GI: PPI for GI ppx. Enteric feeds as tolerated in tandem with NMB and prone positioning.    Renal: Oliguric with prerenal azotemia, UOP 100cc/shift thus far. Monitoring renal indices and fluid status, diuresing as needed to goal euvolemic to net negative fluid balance as tolerated by hemodynamics.    Heme: Hypercoagulable state due to COVID19 infection, on full dose Lovenox for VTE ppx    ID: s/p Actemra. Judicious use of antibiotics based on biomarkers and culture data, trending fever curve.     Endo: Aggressive glycemic control to keep FS glucose to <180mg/dl while critically ill.         COVID 19 specific considerations and therapeutic options based on the available and rapidly changing literature.    42 minutes of critical care time spent in the management of this critically ill COVID-19 patient/PUI patient suffering from multisystem organ failure with continuous assessments and interventions based on the interpretation of multiple databases. Critical care time not inclusive of independent time spent on procedures performed. HPI: 41 yo female, PMHx obesity, depression, hypothyroidism, found to be COVID-19 positive, admitted with respiratory insufficiency, developed acute hypoxemic respiratory failure, ARDS, secondary to severe sepsis viral COVID 19 pneumonia.      Hosp day #12  Vent day #6  p:F ratio = 83    Severe ARDS  Prone positioned at 2200  Sedated on Versed and Dilaudid, and Paralyzed with Rocuronium  Oxygenation again worsened overnight, high vent requirements with minimal weaning today, FiO2 80 --> 70%/+14  Distributive shock on Levophed        Vital signs reviewed and physical exam performed where pertinent and urgently required.    Lab/radiology studies/ABG/meds reviewed and interpreted into the assessment and treatment plan.        Assessment/Plan/Therapeutic interventions:    Neuro: Deep sedation and neuromuscular blockade to facilitate ventilation and improve compliance to optimize oxygenation    CV: Developed shock overnight started on Norepinephrine, titrating to maintain MAP >65. Monitoring end points of perfusion.    Pulm: Low Tv lung protective strategy 4-8 cc/kg IBW, with high PEEP, low FiO2 strategy per ARDSnet guidelines. Manipulating vent to maintain paO2 55-80 and pH >7.15 with permissive hypercapnea. Prone ventilation strategy employed again, will place supine at 1400 and repeat ABG post-supination.    GI: PPI for GI ppx. Enteric feeds as tolerated in tandem with NMB and prone positioning.    Renal: Oliguric with prerenal azotemia, s/p diuresis overnight with ~1L UOP. Net negative fluid balance. Monitoring renal indices and fluid status, diuresing as needed to goal euvolemic to net negative fluid balance as tolerated by hemodynamics.    Heme: Hypercoagulable state due to COVID19 infection, on full dose Lovenox for VTE ppx    ID: s/p Actemra. Judicious use of antibiotics based on biomarkers and culture data, trending fever curve.     Endo: Aggressive glycemic control to keep FS glucose to <180mg/dl while critically ill.         COVID 19 specific considerations and therapeutic options based on the available and rapidly changing literature.    42 minutes of critical care time spent in the management of this critically ill COVID-19 patient/PUI patient suffering from multisystem organ failure with continuous assessments and interventions based on the interpretation of multiple databases. Critical care time not inclusive of independent time spent on procedures performed.

## 2020-04-19 NOTE — CHART NOTE - NSCHARTNOTEFT_GEN_A_CORE
Spoke with patient's sister, Tanika Lindsay ([phone]) via [facility]#[id number]. Advised of her critical nature, no further fevers but with severe pneumonia from the coronavirus infection, and high oxygen and ventilator requirements.     Patient lives with her sister Tanika, and their mother. She has never been  and has no children.

## 2020-04-19 NOTE — PROGRESS NOTE ADULT - SUBJECTIVE AND OBJECTIVE BOX
Patient is a 44y old  Female who presents with a chief complaint of SOB (18 Apr 2020 14:14)      BRIEF HOSPITAL COURSE: 44y female pmhx obesity, depression, hypothyroidism, found to be COVID-19 positive, admitted with respiratory distress, complicated by  development of  acute hypoxemic respiratory failure, ARDS, secondary to severe sepsis viral COVID 19 pneumonia. She was proned, turned supine at 13:00 on 4/18.       Events last 24 hours:  Remains sedated on Dilaudid and versed. Chemically paralyzed on Darek. She was re proned tonight at 10pm. Given 40mg IV lasix tonight, w/ over 600cc output so far. Hypotensive restarted on Levophed. Midodrine dose increased.       PAST MEDICAL & SURGICAL HISTORY:  Depression  Hypothyroidism        Hosp day #11d    Vent day # 5   Mode: AC/ CMV (Assist Control/ Continuous Mandatory Ventilation)  RR (machine): 34  TV (machine): 320  FiO2: 60  PEEP: 12  ITime: 0.7  MAP: 20  PIP: 36        Vital signs / Reviewed and Physical Exam Performed where pertinent and urgently required    Lab / Radiology  studies / ABG / Meds -  reviewed and interpreted into the assessment and treatment plan.      Assessment/Plan/Therapeutic interventions      Neuro - Sedation neuromuscular blockade to facilitate safe ventilation w/ Darek. Sedated on Dilaudid and versed,     CV -  Pressor support as needed to maintain MAP 65. Titrating Levophed to maintain MAP >65. Increased Midodrine to 15mg q8hr.            Avoiding fluid challenges           Finished course of Hydroxychloroquine.    Pulm -  ARDS-NET 4-6cc/kg IBW TV as able to maintain plateau pressures <30               Prone ventilation consideration as feasible  Pa02/Fi02 < 150 on Fi02 >60% and PEEP at least 5. Proned for second cycle tonight at 10pm, will prone for 16 hours. FiO2 60% and PEEP 12. ABG in am.                 Vent bundle Reviewed     GI -  PPI daily. Holding feeds while proned.    Renal - Even to negative fluid balance as tolerated by hemodynamics and renal fx.  Feeds to be provided in lieu of IVF.   - Given 40mg IV lasix tonight w/ adequate response in urine output.     Heme -  Full dose Lovenox  in addition to SCD's    ID - ABX discontinuation based on discussion with ID in conjunction with clinical features, culture data, and judicious procalcitonin monitoring.   WBC hanging around 19k past 3 days. Remains Afebrile. Will send Procal in am. Observe off abx for now.     Endo -  Aggressive glycemic control to limit FS glucose to < 180mg/dl. ISS       COVID 19 specific considerations and therapeutic  options based on the available and rapidly changing literature    Goals of care considerations:  Ongoing assessment for patient specific treatment options based on progression or decline.  I have involved the family with updates and requests in guidance for medical decision making.          42  Minutes of critical care tiem spent in the management of this critically ill COVID-19 patient/PUI patient with continuous assessments and interventions based on the interpretation of multiple databases. Patient is a 44y old  Female who presents with a chief complaint of SOB (18 Apr 2020 14:14)      BRIEF HOSPITAL COURSE: 44y female pmhx obesity, depression, hypothyroidism, found to be COVID-19 positive, admitted with respiratory distress, complicated by  development of  acute hypoxemic respiratory failure, ARDS, secondary to severe sepsis viral COVID 19 pneumonia. She was proned, turned supine at 13:00 on 4/18.       Events last 24 hours:  Remains sedated on Dilaudid and versed. Chemically paralyzed on Darek. She was re proned tonight at 10pm. Given 40mg IV lasix tonight, w/ over 600cc output so far. Hypotensive restarted on Levophed. Midodrine dose increased.       PAST MEDICAL & SURGICAL HISTORY:  Depression  Hypothyroidism        Hosp day #11d    Vent day # 5   Mode: AC/ CMV (Assist Control/ Continuous Mandatory Ventilation)  RR (machine): 34  TV (machine): 320  FiO2: 60  PEEP: 12  ITime: 0.7  MAP: 20  PIP: 36        Vital signs / Reviewed and Physical Exam Performed where pertinent and urgently required    Lab / Radiology  studies / ABG / Meds -  reviewed and interpreted into the assessment and treatment plan.      Assessment/Plan/Therapeutic interventions    Impression:  1. Acute hypoxic respiratory failure  2. ARDS  3. COVID-19 Viral pneumonia   4. Hypotension   5. Hypothyroid     Neuro - Sedation neuromuscular blockade to facilitate safe ventilation w/ Darek. Sedated on Dilaudid and versed,     CV -  Pressor support as needed to maintain MAP 65. Titrating Levophed to maintain MAP >65. Increased Midodrine to 15mg q8hr.            Avoiding fluid challenges           Finished course of Hydroxychloroquine.    Pulm -  ARDS-NET 4-6cc/kg IBW TV as able to maintain plateau pressures <30               Prone ventilation consideration as feasible  Pa02/Fi02 < 150 on Fi02 >60% and PEEP at least 5. Proned for second cycle tonight at 10pm, will prone for 16 hours. FiO2 60% and PEEP 12. ABG in am.                 Vent bundle Reviewed     GI -  PPI daily. Holding feeds while proned.    Renal - Even to negative fluid balance as tolerated by hemodynamics and renal fx.  Feeds to be provided in lieu of IVF.   - Given 40mg IV lasix tonight w/ adequate response in urine output.     Heme -  Full dose Lovenox  in addition to SCD's    ID - ABX discontinuation based on discussion with ID in conjunction with clinical features, culture data, and judicious procalcitonin monitoring.   WBC hanging around 19k past 3 days. Remains Afebrile. Will send Procal in am. Observe off abx for now.     Endo -  Aggressive glycemic control to limit FS glucose to < 180mg/dl. ISS   - Hypothyroid, c/w home dose synthroid       COVID 19 specific considerations and therapeutic  options based on the available and rapidly changing literature    Goals of care considerations:  Ongoing assessment for patient specific treatment options based on progression or decline.  I have involved the family with updates and requests in guidance for medical decision making.          42  Minutes of critical care tiem spent in the management of this critically ill COVID-19 patient/PUI patient with continuous assessments and interventions based on the interpretation of multiple databases.

## 2020-04-20 NOTE — CHART NOTE - NSCHARTNOTEFT_GEN_A_CORE
Source: Patient [ ]  Family [ ]   other [x] EMR    Current Diet:   Diet, NPO with Tube Feed:   Tube Feeding Modality: Nasogastric  Glucerna 1.5 Juan  Total Volume for 24 Hours (mL): 1200  Bolus  Total Volume of Bolus (mL):  200  Total # of Feeds: 6  Tube Feed Frequency: Every 4 hours   Tube Feed Start Time: 10:00  Bolus Feed Rate (mL per Hour): 50   Bolus Feed Duration (in Hours): 24 (04-14-20 @ 08:29)    Enteral /Parenteral Nutrition: Glucerna 1.5 bolus 200mL q4 hrs (6x daily) provides 1200mL (1800cal, 99g protein).     Current Weight:   (4/7)   187.3 lbs    % Weight Change: No recent weight documented     Pertinent Medications: MEDICATIONS  (STANDING):  artificial  tears Solution 1 Drop(s) Both EYES two times a day  chlorhexidine 0.12% Liquid 15 milliLiter(s) Oral Mucosa every 12 hours  chlorhexidine 2% Cloths 1 Application(s) Topical daily  dextrose 5%. 1000 milliLiter(s) (50 mL/Hr) IV Continuous <Continuous>  dextrose 50% Injectable 12.5 Gram(s) IV Push once  dextrose 50% Injectable 25 Gram(s) IV Push once  dextrose 50% Injectable 25 Gram(s) IV Push once  enoxaparin Injectable 80 milliGRAM(s) SubCutaneous two times a day  HYDROmorphone Infusion. 1 mG/Hr (1 mL/Hr) IV Continuous <Continuous>  insulin lispro (HumaLOG) corrective regimen sliding scale   SubCutaneous every 6 hours  levothyroxine Injectable 50 MICROGram(s) IV Push at bedtime  midazolam Infusion 0.035 mG/kG/Hr (3 mL/Hr) IV Continuous <Continuous>  midodrine 15 milliGRAM(s) Oral every 8 hours  norepinephrine Infusion 0.05 MICROgram(s)/kG/Min (7.97 mL/Hr) IV Continuous <Continuous>  pantoprazole  Injectable 40 milliGRAM(s) IV Push daily  PARoxetine 10 milliGRAM(s) Oral daily  rocuronium Infusion 8 MICROgram(s)/kG/Min (8.16 mL/Hr) IV Continuous <Continuous>    MEDICATIONS  (PRN):  acetaminophen   Tablet .. 650 milliGRAM(s) Oral every 4 hours PRN Temp greater or equal to 38.5C (101.3F)  dextrose 40% Gel 15 Gram(s) Oral once PRN Blood Glucose LESS THAN 70 milliGRAM(s)/deciLiter  glucagon  Injectable 1 milliGRAM(s) IntraMuscular once PRN Glucose <70 milliGRAM(s)/deciLiter    Pertinent Labs: CBC Full  -  ( 20 Apr 2020 05:47 )  WBC Count : 24.84 K/uL  RBC Count : 4.89 M/uL  Hemoglobin : 13.9 g/dL  Hematocrit : 44.9 %  Platelet Count - Automated : 262 K/uL  Mean Cell Volume : 91.8 fl  Mean Cell Hemoglobin : 28.4 pg  Mean Cell Hemoglobin Concentration : 31.0 gm/dL  Auto Neutrophil # : 20.63 K/uL  Auto Lymphocyte # : 1.61 K/uL  Auto Monocyte # : 1.01 K/uL  Auto Eosinophil # : 0.03 K/uL  Auto Basophil # : 0.09 K/uL  Auto Neutrophil % : 83.0 %  Auto Lymphocyte % : 6.5 %  Auto Monocyte % : 4.1 %  Auto Eosinophil % : 0.1 %  Auto Basophil % : 0.4 %    04-20 Na142 mmol/L Glu 80 mg/dL K+ 4.2 mmol/L Cr  0.88 mg/dL BUN 27.0 mg/dL<H> Phos 4.5 mg/dL Alb 3.5 g/dL PAB n/a       Skin: 1+ generalized edema    Nutrition focused physical exam not conducted     Estimated Needs:   [x] no change since previous assessment  [ ] recalculated:     Current Nutrition Diagnosis: Pt remains at nutrition risk secondary to increased Nutrient Needs related to increased physiological demand to maintain nutrition status as evidenced by acute resp. failure 2/2 COVID PNA.   Pt s/p intubation (4/14) after desatting and becoming progressively more tachypneic on NRB. Vent day #7. Pt tolerating TFs at this time. Aware sedation Versed.     Recommendations:   1) Continue current TF order as tolerated   2)  Rx MVI daily   3) Obtain new weight as feasible    Monitoring and Evaluation:   [ ] PO intake [x] Tolerance to diet prescription [X] Weights  [X] Follow up per protocol [X] Labs:

## 2020-04-20 NOTE — PROGRESS NOTE ADULT - SUBJECTIVE AND OBJECTIVE BOX
Patient is a 44y old  Female who presents with a chief complaint of SOB (19 Apr 2020 12:35)      BRIEF HOSPITAL COURSE: 41 y/o female with pmhx of obesity, depression, hypothyroidism admitted on 4/8 with respiratory distress found to be positive for COVID-19. Admitted to floors initially then developed acute hypoxic respiratory failure on 4/14 requiring intubation with hospital course complicated by ARDS.    Events last 24 hours: proned overnight.    PAST MEDICAL & SURGICAL HISTORY:  Depression  Hypothyroidism      Review of Systems:  unable to obtain due to current clinical condition.      Medications:    midodrine 15 milliGRAM(s) Oral every 8 hours  norepinephrine Infusion 0.05 MICROgram(s)/kG/Min IV Continuous <Continuous>      acetaminophen   Tablet .. 650 milliGRAM(s) Oral every 4 hours PRN  HYDROmorphone Infusion. 1 mG/Hr IV Continuous <Continuous>  midazolam Infusion 0.035 mG/kG/Hr IV Continuous <Continuous>  PARoxetine 10 milliGRAM(s) Oral daily  rocuronium Infusion 8 MICROgram(s)/kG/Min IV Continuous <Continuous>      enoxaparin Injectable 80 milliGRAM(s) SubCutaneous two times a day    pantoprazole  Injectable 40 milliGRAM(s) IV Push daily      dextrose 40% Gel 15 Gram(s) Oral once PRN  dextrose 50% Injectable 12.5 Gram(s) IV Push once  dextrose 50% Injectable 25 Gram(s) IV Push once  dextrose 50% Injectable 25 Gram(s) IV Push once  glucagon  Injectable 1 milliGRAM(s) IntraMuscular once PRN  insulin lispro (HumaLOG) corrective regimen sliding scale   SubCutaneous every 6 hours  levothyroxine Injectable 50 MICROGram(s) IV Push at bedtime    dextrose 5%. 1000 milliLiter(s) IV Continuous <Continuous>      artificial  tears Solution 1 Drop(s) Both EYES two times a day  chlorhexidine 0.12% Liquid 15 milliLiter(s) Oral Mucosa every 12 hours  chlorhexidine 2% Cloths 1 Application(s) Topical daily        Mode: AC/ CMV (Assist Control/ Continuous Mandatory Ventilation)  RR (machine): 34  TV (machine): 320  FiO2: 90  PEEP: 18  ITime: 0.7  MAP: 21  PIP: 38      ICU Vital Signs Last 24 Hrs  T(C): 37.7 (20 Apr 2020 08:00), Max: 37.7 (20 Apr 2020 08:00)  T(F): 99.8 (20 Apr 2020 08:00), Max: 99.8 (20 Apr 2020 08:00)  HR: 108 (20 Apr 2020 04:01) (93 - 110)  BP: --  BP(mean): --  ABP: 110/67 (20 Apr 2020 05:00) (91/53 - 110/67)  ABP(mean): 82 (20 Apr 2020 05:00) (67 - 85)  RR: 34 (20 Apr 2020 04:01) (34 - 34)  SpO2: 96% (20 Apr 2020 04:01) (69% - 96%)      ABG - ( 19 Apr 2020 22:32 )  pH, Arterial: 7.30  pH, Blood: x     /  pCO2: 68    /  pO2: 75    / HCO3: 28    / Base Excess: 4.4   /  SaO2: 93                  I&O's Detail    19 Apr 2020 07:01  -  20 Apr 2020 07:00  --------------------------------------------------------  IN:    HYDROmorphone Infusion.: 39 mL    midazolam Infusion: 39 mL    rocuronium Infusion: 106.6 mL  Total IN: 184.6 mL    OUT:    Indwelling Catheter - Urethral: 950 mL  Total OUT: 950 mL    Total NET: -765.4 mL      LABS:                        13.9   24.84 )-----------( 262      ( 20 Apr 2020 05:47 )             44.9     04-20    142  |  97<L>  |  27.0<H>  ----------------------------<  80  4.2   |  29.0  |  0.88    Ca    8.7      20 Apr 2020 05:47  Phos  4.5     04-20  Mg     2.3     04-20    TPro  6.6  /  Alb  3.5  /  TBili  1.0  /  DBili  x   /  AST  96<H>  /  ALT  174<H>  /  AlkPhos  105  04-20    CAPILLARY BLOOD GLUCOSE      POCT Blood Glucose.: 114 mg/dL (20 Apr 2020 05:43)        CULTURES:      Physical Examination:    physical exam performed where clinically necessary based on current COVID-19 standards.    RADIOLOGY: reviewed    CRITICAL CARE TIME SPENT: 40 minutes of critical care time spent providing medical care for patient's acute illness/conditions that impairs at least one vital organ system and/or poses a high risk of imminent or life threatening deterioration in the patient's condition. It includes time spent evaluating and treating the patient's acute illness as well as time spent reviewing labs, radiology, discussing goals of care with patient and/or patient's family, and discussing the case with a multidisciplinary team in an effort to prevent further life threatening deterioration or end organ damage. This time is independent of any procedures performed.

## 2020-04-20 NOTE — CHART NOTE - NSCHARTNOTEFT_GEN_A_CORE
Updated patients sister Tanika with assistance from [facility]  Adriana ID [id number]. I explained the patients critical condition and her overnight events including worsening oxygenation and requiring proning for the 3rd time. Answered all questions, Tanika grateful for ongoing care. Requesting a facetime visit tomorrow, once patient is placed supine.

## 2020-04-21 NOTE — CHART NOTE - NSCHARTNOTEFT_GEN_A_CORE
Updated patients sister Tanika with. I explained the patients critical condition and her overnight events, she is requesting that pt's brother Yvon Lindsay to be called tomorrow if possible ( [phone]) he is living in [place] . All questions were answered at this time .

## 2020-04-21 NOTE — PROGRESS NOTE ADULT - SUBJECTIVE AND OBJECTIVE BOX
Admit Date: 04-08-20  Length of Stay: 13d    43 y/o female with pmhx of obesity, depression, hypothyroidism admitted on 4/8 with respiratory distress found to be positive for COVID-19. Admitted to floors initially then developed acute hypoxic respiratory failure on 4/14 requiring intubation with hospital course complicated by ARDS.    Reason for admission to ICU:  Patient is in acute hypoxic respiratory distress requiring intubation and sedation. Imaging c/w ARDS. Admitted to MICU for further observation and management.    INTERVAL HPI/OVERNIGHT EVENTS:  No improvement, today, very tachycardic. She remains on high vent setting with FIO2 of 100 % and PEEP of 16. Plateau pressure of 34 today.  She is on Darek at 8mcg, Versed 0.1 mg/kg and Dilaudid 2 mg/hr.    MEDICATIONS  (STANDING):  artificial  tears Solution 1 Drop(s) Both EYES two times a day  chlorhexidine 0.12% Liquid 15 milliLiter(s) Oral Mucosa every 12 hours  chlorhexidine 2% Cloths 1 Application(s) Topical daily  dextrose 5%. 1000 milliLiter(s) (50 mL/Hr) IV Continuous <Continuous>  dextrose 50% Injectable 12.5 Gram(s) IV Push once  dextrose 50% Injectable 25 Gram(s) IV Push once  dextrose 50% Injectable 25 Gram(s) IV Push once  enoxaparin Injectable 80 milliGRAM(s) SubCutaneous two times a day  HYDROmorphone Infusion. 1 mG/Hr (1 mL/Hr) IV Continuous <Continuous>  insulin lispro (HumaLOG) corrective regimen sliding scale   SubCutaneous every 6 hours  levothyroxine Injectable 50 MICROGram(s) IV Push at bedtime  midazolam Infusion 0.02 mG/kG/Hr (1.7 mL/Hr) IV Continuous <Continuous>  midodrine 15 milliGRAM(s) Oral every 8 hours  pantoprazole  Injectable 40 milliGRAM(s) IV Push daily  PARoxetine 10 milliGRAM(s) Oral daily  rocuronium Infusion 8 MICROgram(s)/kG/Min (8.16 mL/Hr) IV Continuous <Continuous>    MEDICATIONS  (PRN):  acetaminophen   Tablet .. 650 milliGRAM(s) Oral every 4 hours PRN Temp greater or equal to 38.5C (101.3F)  dextrose 40% Gel 15 Gram(s) Oral once PRN Blood Glucose LESS THAN 70 milliGRAM(s)/deciLiter  glucagon  Injectable 1 milliGRAM(s) IntraMuscular once PRN Glucose <70 milliGRAM(s)/deciLiter      Vitals:  Vital Signs Last 24 Hrs  T(C): 38.6 (21 Apr 2020 12:05), Max: 38.9 (21 Apr 2020 08:26)  T(F): 101.4 (21 Apr 2020 12:05), Max: 102 (21 Apr 2020 08:26)  HR: 139 (21 Apr 2020 12:00) (101 - 139)  BP: --  BP(mean): --  RR: 34 (21 Apr 2020 08:00) (34 - 34)  SpO2: 91% (21 Apr 2020 12:00) (91% - 93%)    Vitals (Invasive):  ABP: 129/78 (04-21-20 @ 12:00) (104/65 - 142/70)      I&O's Summary    20 Apr 2020 07:01  -  21 Apr 2020 07:00  --------------------------------------------------------  IN: 346.5 mL / OUT: 1150 mL / NET: -803.5 mL    21 Apr 2020 07:01  -  21 Apr 2020 14:26  --------------------------------------------------------  IN: 500 mL / OUT: 350 mL / NET: 150 mL        Physical Exam:  HEENT:     + NCAT +ETT  + NGT/OGT  Neck:        No JVD, supple  Chest:         - Sternal click  - Sternal drainage  - Chest tubes  - SubQ emphysema  Lungs:          Decrease air entry, +rhonchi,   - Wheezing    Cardiac:       + S1 + S2 , tachycardic  - S3  - S4    - Murmurs   - Rub    Abdomen:    + BS     + Soft    + Non-tender     - Distended    - Organomegaly  - PEG  Extremities:   + trace ankle edema    Neuro:       + Sedated   + Paralyzed   Skin:        - Rashes    - Erythema     Labs:  COVID:  COVID-19 PCR: Positive for 2019-nCoV NOTE: The COVID-19 assay has been cleared by the U.S. Food and Drug  Administration under the Emergency Use Authorization (EUA). [facility] is designated as a high complexity laboratory by the Clinical  Laboratory Improvement Amendments of 1988(CLIA) and is qualified to  perform  this test. ASSAY INFORMATION: Real Time RT-PCR  Performed By:  [facility]  [address] [place] [address]  Louis Khan M.D. (04-07-20 @ 22:37)                          13.2   26.71 )-----------( 273      ( 21 Apr 2020 03:49 )             42.1     04-21    139  |  97<L>  |  28.0<H>  ----------------------------<  154<H>  4.7   |  29.0  |  0.74    Ca    8.7      21 Apr 2020 03:49  Phos  3.7     04-21  Mg     2.3     04-21    TPro  6.6  /  Alb  3.5  /  TBili  1.0  /  DBili  x   /  AST  96<H>  /  ALT  174<H>  /  AlkPhos  105  04-20          COVID related labs:  21 Apr 2020 03:49  LDH:  532<H>  Ferritin, Serum: 310<H>    Blood Gases:  (ARTERIAL):  04-20-20 @ 22:08  pH 7.31 / pCO2 63 / pO2 72 / HCO3 28  Total CO2 --  FiO2 --  Oxygen Saturation 94  Total Hemoglobin, Calculated 13.8  Hematocrit, Calculated 42  Oxygen Content 18  Blood Gas Arterial - Calcium, Ionized 1.14  Blood Gas Arterial - Chloride 98  Blood Gas Arterial - Glucose 119  Blood Gas Arterial - Potassium 4.8  Blood Gas Arterial - Sodium 141  Blood Gas Arterial - Creatinine --  Base Excess, Arterial 3.8    (VENOUS):      Radiology:  CT chest without contrast done on April 16 reviewed: extensive b/l dense opacity, posterior lower lobe predominant    A/P   27 yo female with COVID -19   Severe ARDS with P/F <100  Current vent settings: 34/320/16/100%  Mode: AC/ CMV (Assist Control/ Continuous Mandatory Ventilation)  Plateau pressure of 31 this morning and 34 this afternoom  She has been proned multiple times with no significant change   Some degree of permissive of hypercapnia is Ok  Consider proning again however it has not benefited her  Continue to closely monitor patient    Cardiovascular:  tachyarrhythmia - Well sedated, afebrile but yet HR in the 130's -140's   She fully anticoagulated with Lovenox  LE doppler to r/o PE and a limited echo to evaluate the heart function  Challenge of 500ml IV bolus     Neurology:  Sedated / paralyzed  Versed and Dilaudid  RASS goal -4    Gastrointestinal:  Prophylaxis  - Pantoprazole 40mg IV daily while intubated    Genitourinary:  Decreased urine output  Cook in place  Continue to monitor strict I&O  Follow Up: Urinalysis, Creatinine    ID:  COVID 19 PCR status: positive  Cultures: NGTD  Hydroxychlorquine: completed  Biologics: s/p tocilizumab on 4/14.   WBC elevated but PCT normal- likely from recent steroids     RENAL:   Adequate urine output.  Close Electrolyte monitoring and correction as needed     ENDO: accuchecks q 6 hours with sliding scale. Goal 140-180  Continue with Synthroid       HEME: on full dose Lovenox for COVID anticoagulation.     GI and DVT PPx: PPI and Lovenox    Code: FULL CODE    Prognosis: Poor, family updated     Disposition: Patient requires continued monitoring in MICU Admit Date: 04-08-20  Length of Stay: 13d    43 y/o female with pmhx of obesity, depression, hypothyroidism admitted on 4/8 with respiratory distress found to be positive for COVID-19. Admitted to floors initially then developed acute hypoxic respiratory failure on 4/14 requiring intubation with hospital course complicated by ARDS.    Reason for admission to ICU:  Patient is in acute hypoxic respiratory distress requiring intubation and sedation. Imaging c/w ARDS. Admitted to MICU for further observation and management.    INTERVAL HPI/OVERNIGHT EVENTS:  No improvement, today, very tachycardic. She remains on high vent setting with FIO2 of 100 % and PEEP of 16. Plateau pressure of 34 today.  She is on Darek at 8mcg, Versed 0.1 mg/kg and Dilaudid 2 mg/hr.    MEDICATIONS  (STANDING):  artificial  tears Solution 1 Drop(s) Both EYES two times a day  chlorhexidine 0.12% Liquid 15 milliLiter(s) Oral Mucosa every 12 hours  chlorhexidine 2% Cloths 1 Application(s) Topical daily  dextrose 5%. 1000 milliLiter(s) (50 mL/Hr) IV Continuous <Continuous>  dextrose 50% Injectable 12.5 Gram(s) IV Push once  dextrose 50% Injectable 25 Gram(s) IV Push once  dextrose 50% Injectable 25 Gram(s) IV Push once  enoxaparin Injectable 80 milliGRAM(s) SubCutaneous two times a day  HYDROmorphone Infusion. 1 mG/Hr (1 mL/Hr) IV Continuous <Continuous>  insulin lispro (HumaLOG) corrective regimen sliding scale   SubCutaneous every 6 hours  levothyroxine Injectable 50 MICROGram(s) IV Push at bedtime  midazolam Infusion 0.02 mG/kG/Hr (1.7 mL/Hr) IV Continuous <Continuous>  midodrine 15 milliGRAM(s) Oral every 8 hours  pantoprazole  Injectable 40 milliGRAM(s) IV Push daily  PARoxetine 10 milliGRAM(s) Oral daily  rocuronium Infusion 8 MICROgram(s)/kG/Min (8.16 mL/Hr) IV Continuous <Continuous>    MEDICATIONS  (PRN):  acetaminophen   Tablet .. 650 milliGRAM(s) Oral every 4 hours PRN Temp greater or equal to 38.5C (101.3F)  dextrose 40% Gel 15 Gram(s) Oral once PRN Blood Glucose LESS THAN 70 milliGRAM(s)/deciLiter  glucagon  Injectable 1 milliGRAM(s) IntraMuscular once PRN Glucose <70 milliGRAM(s)/deciLiter      Vitals:  Vital Signs Last 24 Hrs  T(C): 38.6 (21 Apr 2020 12:05), Max: 38.9 (21 Apr 2020 08:26)  T(F): 101.4 (21 Apr 2020 12:05), Max: 102 (21 Apr 2020 08:26)  HR: 139 (21 Apr 2020 12:00) (101 - 139)  BP: --  BP(mean): --  RR: 34 (21 Apr 2020 08:00) (34 - 34)  SpO2: 91% (21 Apr 2020 12:00) (91% - 93%)    Vitals (Invasive):  ABP: 129/78 (04-21-20 @ 12:00) (104/65 - 142/70)      I&O's Summary    20 Apr 2020 07:01  -  21 Apr 2020 07:00  --------------------------------------------------------  IN: 346.5 mL / OUT: 1150 mL / NET: -803.5 mL    21 Apr 2020 07:01  -  21 Apr 2020 14:26  --------------------------------------------------------  IN: 500 mL / OUT: 350 mL / NET: 150 mL        Physical Exam:  HEENT:     + NCAT +ETT  + NGT/OGT  Neck:        No JVD, supple  Chest:         - Sternal click  - Sternal drainage  - Chest tubes  - SubQ emphysema  Lungs:          Decrease air entry, +rhonchi,   - Wheezing    Cardiac:       + S1 + S2 , tachycardic  - S3  - S4    - Murmurs   - Rub    Abdomen:    + BS     + Soft    + Non-tender     - Distended    - Organomegaly  - PEG  Extremities:   + trace ankle edema    Neuro:       + Sedated   + Paralyzed   Skin:        - Rashes    - Erythema     Labs:  COVID:  COVID-19 PCR: Positive for 2019-nCoV NOTE: The COVID-19 assay has been cleared by the U.S. Food and Drug  Administration under the Emergency Use Authorization (EUA). [facility] is designated as a high complexity laboratory by the Clinical  Laboratory Improvement Amendments of 1988(CLIA) and is qualified to  perform  this test. ASSAY INFORMATION: Real Time RT-PCR  Performed By:  [facility]  [address] [place] [address]  Louis Khan M.D. (04-07-20 @ 22:37)                          13.2   26.71 )-----------( 273      ( 21 Apr 2020 03:49 )             42.1     04-21    139  |  97<L>  |  28.0<H>  ----------------------------<  154<H>  4.7   |  29.0  |  0.74    Ca    8.7      21 Apr 2020 03:49  Phos  3.7     04-21  Mg     2.3     04-21    TPro  6.6  /  Alb  3.5  /  TBili  1.0  /  DBili  x   /  AST  96<H>  /  ALT  174<H>  /  AlkPhos  105  04-20          COVID related labs:  21 Apr 2020 03:49  LDH:  532<H>  Ferritin, Serum: 310<H>    Blood Gases:  (ARTERIAL):  04-20-20 @ 22:08  pH 7.31 / pCO2 63 / pO2 72 / HCO3 28  Total CO2 --  FiO2 --  Oxygen Saturation 94  Total Hemoglobin, Calculated 13.8  Hematocrit, Calculated 42  Oxygen Content 18  Blood Gas Arterial - Calcium, Ionized 1.14  Blood Gas Arterial - Chloride 98  Blood Gas Arterial - Glucose 119  Blood Gas Arterial - Potassium 4.8  Blood Gas Arterial - Sodium 141  Blood Gas Arterial - Creatinine --  Base Excess, Arterial 3.8    (VENOUS):      Radiology:  CT chest without contrast done on April 16 reviewed: extensive b/l dense opacity, posterior lower lobe predominant    A/P   43 yo female with COVID -19   Severe ARDS with P/F <100  Current vent settings: 34/320/16/100%  Mode: AC/ CMV (Assist Control/ Continuous Mandatory Ventilation)  Plateau pressure of 31 this morning and 34 this afternoom  She has been proned multiple times with no significant change   Some degree of permissive of hypercapnia is Ok  Consider proning again however it has not benefited her  Continue to closely monitor patient    Cardiovascular:  tachyarrhythmia - Well sedated, afebrile but yet HR in the 130's -140's   She fully anticoagulated with Lovenox  LE doppler to r/o PE and a limited echo to evaluate the heart function  Challenge of 500ml IV bolus     Neurology:  Sedated / paralyzed  Versed and Dilaudid  RASS goal -4    Gastrointestinal:  Prophylaxis  - Pantoprazole 40mg IV daily while intubated    Genitourinary:  Decreased urine output  Cook in place  Continue to monitor strict I&O  Follow Up: Urinalysis, Creatinine    ID:  COVID 19 PCR status: positive  Cultures: NGTD  Hydroxychlorquine: completed  Biologics: s/p tocilizumab on 4/14.   WBC elevated but PCT normal- likely from recent steroids     RENAL:   Adequate urine output.  Close Electrolyte monitoring and correction as needed     ENDO: accuchecks q 6 hours with sliding scale. Goal 140-180  Continue with Synthroid       HEME: on full dose Lovenox for COVID anticoagulation.     GI and DVT PPx: PPI and Lovenox    Code: FULL CODE    Prognosis: Poor, family updated     Disposition: Patient requires continued monitoring in MICU

## 2020-04-22 NOTE — PROGRESS NOTE ADULT - SUBJECTIVE AND OBJECTIVE BOX
Admit Date: 04-08-20  Length of Stay: 13d    45 y/o female with pmhx of obesity, depression, hypothyroidism admitted on 4/8 with respiratory distress found to be positive for COVID-19. Admitted to floors initially then developed acute hypoxic respiratory failure on 4/14 requiring intubation with hospital course complicated by ARDS.    Reason for admission to ICU:  Patient is in acute hypoxic respiratory distress requiring intubation and sedation. Imaging c/w ARDS. Admitted to MICU for further observation and management.    INTERVAL HPI/OVERNIGHT EVENTS:  Last 24 hours: Diagnosed with LE DVT while on full dose Lovenox. She is currently on a heparin. Febrile with Temp of 103.3. Overnight her Oxygen requirement went down to 70% from 100% and PEEP down to 14 from 16. Plateau pressure is 30 today.  She is Versed 0.1 mg/kg and Dilaudid 2 mg/hr.    MEDICATIONS  (STANDING):  acetaminophen  IVPB .. 1000 milliGRAM(s) IV Intermittent once  artificial  tears Solution 1 Drop(s) Both EYES two times a day  chlorhexidine 0.12% Liquid 15 milliLiter(s) Oral Mucosa every 12 hours  chlorhexidine 2% Cloths 1 Application(s) Topical daily  dextrose 5%. 1000 milliLiter(s) (50 mL/Hr) IV Continuous <Continuous>  dextrose 50% Injectable 12.5 Gram(s) IV Push once  dextrose 50% Injectable 25 Gram(s) IV Push once  dextrose 50% Injectable 25 Gram(s) IV Push once  heparin  Infusion.  Unit(s)/Hr (15 mL/Hr) IV Continuous <Continuous>  HYDROmorphone Infusion. 1 mG/Hr (1 mL/Hr) IV Continuous <Continuous>  insulin lispro (HumaLOG) corrective regimen sliding scale   SubCutaneous every 6 hours  levothyroxine Injectable 50 MICROGram(s) IV Push at bedtime  midazolam Infusion 0.02 mG/kG/Hr (1.7 mL/Hr) IV Continuous <Continuous>  midodrine 15 milliGRAM(s) Oral every 8 hours  pantoprazole  Injectable 40 milliGRAM(s) IV Push daily  PARoxetine 10 milliGRAM(s) Oral daily  sodium phosphate IVPB 15 milliMole(s) IV Intermittent once    MEDICATIONS  (PRN):  acetaminophen   Tablet .. 650 milliGRAM(s) Oral every 4 hours PRN Temp greater or equal to 38.5C (101.3F)  dextrose 40% Gel 15 Gram(s) Oral once PRN Blood Glucose LESS THAN 70 milliGRAM(s)/deciLiter  glucagon  Injectable 1 milliGRAM(s) IntraMuscular once PRN Glucose <70 milliGRAM(s)/deciLiter  heparin   Injectable 6500 Unit(s) IV Push every 6 hours PRN For aPTT less than 40  heparin   Injectable 3000 Unit(s) IV Push every 6 hours PRN For aPTT between 40 - 57      Vitals:  ICU Vital Signs Last 24 Hrs  T(C): 40.1 (22 Apr 2020 08:16), Max: 40.1 (22 Apr 2020 08:16)  T(F): 104.1 (22 Apr 2020 08:16), Max: 104.1 (22 Apr 2020 08:16)  HR: 138 (22 Apr 2020 10:13) (118 - 139)  BP: --  BP(mean): --  ABP: 133/77 (22 Apr 2020 03:43) (129/75 - 134/84)  ABP(mean): 94 (22 Apr 2020 03:43) (90 - 94)  RR: 34 (22 Apr 2020 03:43) (34 - 34)  SpO2: 92% (22 Apr 2020 10:13) (91% - 96%)      I&O's Summary    I&O's Summary    21 Apr 2020 07:01  -  22 Apr 2020 07:00  --------------------------------------------------------  IN: 2403.2 mL / OUT: 1470 mL / NET: 933.2 mL      Physical Exam:  HEENT:     + NCAT +ETT  + NGT/OGT  Neck:        No JVD, supple  Chest:         - Sternal click  - Sternal drainage  - Chest tubes  - SubQ emphysema  Lungs:          Decrease air entry, +rhonchi,   - Wheezing    Cardiac:       + S1 + S2 , tachycardic  - S3  - S4    - Murmurs   - Rub    Abdomen:    + BS     + Soft    + Non-tender     - Distended    - Organomegaly  - PEG  Extremities:   + trace ankle edema    Neuro:       + Sedated - pain response  Skin:        - Rashes    - Erythema     Labs:  COVID:  COVID-19 PCR: Positive for 2019-nCoV NOTE: The COVID-19 assay has been cleared by the U.S. Food and Drug  Administration under the Emergency Use Authorization (EUA). [facility] is designated as a high complexity laboratory by the Clinical  Laboratory Improvement Amendments of 1988(CLIA) and is qualified to  perform  this test. ASSAY INFORMATION: Real Time RT-PCR  Performed By:  [facility]  [address] [place] [address]  Louis Khan M.D. (04-07-20 @ 22:37)  ABG - ( 22 Apr 2020 04:52 )  pH, Arterial: 7.31  pH, Blood: x     /  pCO2: 75    /  pO2: 74    / HCO3: 33    / Base Excess: 9.0   /  SaO2: 95                             11.1   27.05 )-----------( 233      ( 22 Apr 2020 05:16 )             36.7     04-22    142  |  99  |  27.0<H>  ----------------------------<  125<H>  5.0   |  28.0  |  0.61    Ca    8.1<L>      22 Apr 2020 05:16  Phos  1.4     04-22  Mg     2.4     04-22    TPro  5.9<L>  /  Alb  3.1<L>  /  TBili  2.8<H>  /  DBili  x   /  AST  158<H>  /  ALT  199<H>  /  AlkPhos  155<H>  04-22    COVID related labs:  21 Apr 2020 03:49  LDH:  532<H>  Ferritin, Serum: 310<H>        Radiology:  CT chest without contrast done on April 16 reviewed: extensive b/l dense opacity, posterior lower lobe predominant    A/P   45 yo female with COVID -19   Severe ARDS with P/F <150 however improving from yesterday  Current vent settings: 34/320/14/70%  Mode: AC/ CMV (Assist Control/ Continuous Mandatory Ventilation)  ABG noted, mild resp acidosis, acceptable in her condition  Ideally would need to be proned however she has has significant response from past proning   Continue to closely monitor patient  Continue to wean FIO2 slowly if able    Cardiovascular:  tachyarrhythmia -HR in the 130's -140's  Fever may be a contributing factor  Will consider low dose BB once defervesced    Neurology:  Sedated  Versed and Dilaudid  RASS goal -3     Gastrointestinal:  Prophylaxis  Pantoprazole 40mg IV daily while intubated    Genitourinary:  Decreased urine output  Cook in place  Continue to monitor strict I&O  Follow Up: Urinalysis, Creatinine    ID:  COVID 19 PCR status: positive  D dimer trending down while on heparin drip  Cultures: NGTD  Hydroxychlorquine: completed  Biologics: s/p tocilizumab on 4/14.   WBC elevated but PCT normal- likely from recent steroids     RENAL:   Adequate urine output.  Hypophosphatemia   Hyperkalemia  Close Electrolyte monitoring and correction as needed     ENDO: accuchecks q 6 hours with sliding scale. Goal 140-180  Continue with Synthroid       HEME: Heparin drip for LE DVT.     GI and DVT PPx: PPI and Heparin drip    Code: FULL CODE    Prognosis: Poor, family updated     Disposition: Patient requires continued monitoring in MICU

## 2020-04-22 NOTE — CHART NOTE - NSCHARTNOTEFT_GEN_A_CORE
Spoke with patient's brother, Yvon Lindsay ([phone]). Updated on patient's current condition and plan to wean oxygen therapy slowly. Patient's brother is in agreement and grateful for call. Will update again at a later time.     -Elba Sow, PGY3

## 2020-04-23 NOTE — CONSULT NOTE ADULT - SUBJECTIVE AND OBJECTIVE BOX
[facility]  INFECTIOUS DISEASES AND INTERNAL MEDICINE at [place]  =======================================================  Kamran Leija MD  Diplomates American Board of Internal Medicine and Infectious Diseases  =======================================================    [id number]  BAUDILIO SHAH     CC: SOB     HPI:  45 y/o woman with morbid obesity, Hypothyroidism and Depression was admitted on 4/8 with SOB, cough and congestion. She has a positive COVID19 test.   She got worse with respiratory failure and intubated on 4/14.  She has extensive bilateral airspace opacities may represent multifocal pneumonia.  ID has been called for possible bacterial superinfection due to fever, leukocytosis and increased PCT.     PAST MEDICAL & SURGICAL HISTORY:  Depression  Hypothyroidism    Social Hx: unknown    FAMILY HISTORY: unknown     Allergies  No Known Allergies    Antibiotics:  piperacillin/tazobactam IVPB.. 3.375 Gram(s) IV Intermittent every 8 hours    REVIEW OF SYSTEMS:  Unable to answer     Physical Exam:  Vital Signs Last 24 Hrs  T(C): 38.3 (23 Apr 2020 12:14), Max: 38.5 (23 Apr 2020 05:22)  T(F): 100.9 (23 Apr 2020 12:14), Max: 101.3 (23 Apr 2020 05:22)  HR: 93 (23 Apr 2020 12:00) (93 - 114)  RR: 34 (23 Apr 2020 12:00) (34 - 34)  SpO2: 94% (23 Apr 2020 12:00) (90% - 95%)  GEN: NAD  HEENT: normocephalic and atraumatic. EOMI. PERRL.    NECK: Supple.  No lymphadenopathy   LUNGS: Clear to auscultation.  HEART: Regular rate and rhythm   ABDOMEN: Soft, nontender, and nondistended.  Positive bowel sounds.    EXTREMITIES: Without edema.  NEUROLOGIC: grossly intact.  PSYCHIATRIC: Appropriate affect .  SKIN: No rash    Labs:  04-23    146<H>  |  101  |  25.0<H>  ----------------------------<  117<H>  4.9   |  33.0<H>  |  0.47<L>    Ca    8.3<L>      23 Apr 2020 06:00  Phos  1.1     04-23  Mg     2.4     04-23    TPro  5.7<L>  /  Alb  3.2<L>  /  TBili  1.9  /  DBili  x   /  AST  216<H>  /  ALT  336<H>  /  AlkPhos  182<H>  04-23                        9.7    29.02 )-----------( 187      ( 23 Apr 2020 06:00 )             31.4     PT/INR - ( 21 Apr 2020 22:20 )   PT: 15.2 sec;   INR: 1.33 ratio    PTT - ( 23 Apr 2020 11:34 )  PTT:74.8 sec    LIVER FUNCTIONS - ( 23 Apr 2020 06:00 )  Alb: 3.2 g/dL / Pro: 5.7 g/dL / ALK PHOS: 182 U/L / ALT: 336 U/L / AST: 216 U/L / GGT: x           ABG - ( 23 Apr 2020 15:29 )  pH, Arterial: 7.43  pH, Blood: x     /  pCO2: 59    /  pO2: 106   / HCO3: 37    / Base Excess: 12.9  /  SaO2: 99        RECENT CULTURES:  04-22 @ 03:40 .Sputum Sputum     Normal Respiratory Fina present    Moderate polymorphonuclear leukocytes per low power field  No Squamous epithelial cells per low power field  No organisms seen per oil power field    All imaging and other data have been reviewed.  < from: Xray Chest 1 View- PORTABLE-Urgent (04.21.20 @ 20:24) >   EXAM:  XR CHEST PORTABLE URGENT 1V                        PROCEDURE DATE:  04/21/2020    INTERPRETATION:  History: COVID pneumonia and hypoxia  Portable radiograph of the chest is performed. comparison is made to 4/14/2020  ET tube NG tube and right IJ line are again noted. Patchy mid to lower lung infiltrates are mildly improved on the left. There is no pleural effusion. A right midline is again seen.  Impression: Mild improvement in left-sided infiltrate.  DISCRETE X-RAY DATA:  Percent of LEFT lung opacification: 1-33%  Percent of RIGHT lung opacification: 1-33%  Change in lung opacification from most recent x-ray (<=3 days): No Prior  Change from prior dated 3 or more days (same admission): Decrease    Assessment and Plan:   45 y/o woman with morbid obesity, Hypothyroidism and Depression was admitted on 4/8 with SOB, cough and congestion. She has a positive COVID19 test.   She got worse with respiratory failure and intubated on 4/14.  She has extensive bilateral airspace opacities may represent multifocal pneumonia.  ID has been called for possible bacterial superinfection due to fever, leukocytosis and increased PCT.   I doubt that she has bacterial infection, CRP is going down, passed the interleukin storm.    COVID 19 + infection  Viral pneumonia  Acute hypoxic respiratory failure    - Blood culture 4/7  - Blood culture from 4/22 pending so far neg.   - Sputum so far negative 4/22, will follow   - Airborne and contact isolation   - COVID 19 PCR positive   - CXR with multifocal opacities mild clearing in left side.   - Procalcitonin 0.1-->0.27  - CRP=8.19-->1.71, Ferritin and LDH high   - Completed Hydroxychloroquine   - Check CBC with diff, CMP with LFT's, Inflammatory markers (D-dimer, CRP, Ferritin, LDH,  procalcitonin)  q48h.    - Avoid antibiotics unless there is a concern for a bacterial infection  - S/P steroid  - Trend WBC 27K high possible due to steroid   - Can continue zosyn 3.375gm q8h until we have cultures back.     Will follow.

## 2020-04-24 NOTE — CONSULT NOTE ADULT - ASSESSMENT
44 year old female admitted with covid-19+ respiratory failure on vent who developed a left calf vein DVT while on therapeutic dose lovenox.    1) DVT - screening HIT-ab positive. Intermediate 4-T pre-test prob score. On argatroban. Wait for serotonin release assay result. If negative then can use heparin product.    2) Covid-19+ - as per primary, ICU.    3) Leukocytosis - reactive, neutrophil predominant. Secondary to infection, covid. Monitor.    4. Normocytic anemia - Hgb decrease from 13.2 on 4/21/2020 to 8.9 today. Monitor for bleeding. Check reticulocyte count, LDH, haptoglobin to rule out hemolysis, iron saturation %, also fibrinogen to rule out DIC.  Monitor for blood loss. Transfuse if hgb <7.0.    Will follow.    Thank you for allowing us to participate in this patient's care and please feel free to communicate me with any questions.

## 2020-04-24 NOTE — PROVIDER CONTACT NOTE (EICU) - SITUATION
MONICA lab order review.
Electrolyte review.
MONICA lab order review.

## 2020-04-24 NOTE — PROGRESS NOTE ADULT - SUBJECTIVE AND OBJECTIVE BOX
[facility]  INFECTIOUS DISEASES AND INTERNAL MEDICINE at [place]  =======================================================  Kamran Leija MD  Diplomates American Board of Internal Medicine and Infectious Diseases  =======================================================    [id number]  BAUDILIO SHAH     Follow up: respiratory failure, covid19    On FIO2 100% with sat around 90, not doing well.   Febrile    PAST MEDICAL & SURGICAL HISTORY:  Depression  Hypothyroidism    Social Hx: unknown    FAMILY HISTORY: unknown     Allergies  No Known Allergies    Antibiotics:  piperacillin/tazobactam IVPB.. 3.375 Gram(s) IV Intermittent every 8 hours    REVIEW OF SYSTEMS:  Unable to answer     Physical Exam:  Vital Signs Last 24 Hrs  T(C): 39 (24 Apr 2020 12:12), Max: 39 (24 Apr 2020 12:12)  T(F): 102.2 (24 Apr 2020 12:12), Max: 102.2 (24 Apr 2020 12:12)  HR: 105 (24 Apr 2020 09:19) (84 - 105)  BP: --  BP(mean): --  RR: 32 (24 Apr 2020 08:00) (18 - 34)  SpO2: 90% (24 Apr 2020 09:19) (90% - 95%)  GEN: NAD  HEENT: normocephalic and atraumatic. EOMI. PERRL.    NECK: Supple.  No lymphadenopathy   LUNGS: Clear to auscultation.  HEART: Regular rate and rhythm   ABDOMEN: Soft, nontender, and nondistended.  Positive bowel sounds.    EXTREMITIES: Without edema.  NEUROLOGIC: grossly intact.  PSYCHIATRIC: Appropriate affect .  SKIN: No rash    Labs:  04-24    145  |  100  |  23.0<H>  ----------------------------<  106<H>  4.4   |  33.0<H>  |  0.47<L>    Ca    8.1<L>      24 Apr 2020 03:48  Phos  2.3     04-24  Mg     2.1     04-24    TPro  5.4<L>  /  Alb  2.9<L>  /  TBili  1.0  /  DBili  x   /  AST  89<H>  /  ALT  226<H>  /  AlkPhos  146<H>  04-24                        8.9    20.75 )-----------( 153      ( 24 Apr 2020 03:47 )             28.8     PTT - ( 24 Apr 2020 07:16 )  PTT:62.4 sec    LIVER FUNCTIONS - ( 24 Apr 2020 03:48 )  Alb: 2.9 g/dL / Pro: 5.4 g/dL / ALK PHOS: 146 U/L / ALT: 226 U/L / AST: 89 U/L / GGT: x           ABG - ( 24 Apr 2020 03:28 )  pH, Arterial: 7.44  pH, Blood: x     /  pCO2: 58    /  pO2: 59    / HCO3: 37    / Base Excess: 13.6  /  SaO2: 92          RECENT CULTURES:  04-22 @ 03:40 .Sputum Sputum     Normal Respiratory Fina present    Moderate polymorphonuclear leukocytes per low power field  No Squamous epithelial cells per low power field  No organisms seen per oil power field    04-21 @ 15:44 .Blood Blood-Peripheral     No growth at 48 hours    All imaging and other data have been reviewed.  < from: Xray Chest 1 View- PORTABLE-Urgent (04.21.20 @ 20:24) >   EXAM:  XR CHEST PORTABLE URGENT 1V                        PROCEDURE DATE:  04/21/2020    INTERPRETATION:  History: COVID pneumonia and hypoxia  Portable radiograph of the chest is performed. comparison is made to 4/14/2020  ET tube NG tube and right IJ line are again noted. Patchy mid to lower lung infiltrates are mildly improved on the left. There is no pleural effusion. A right midline is again seen.  Impression: Mild improvement in left-sided infiltrate.  DISCRETE X-RAY DATA:  Percent of LEFT lung opacification: 1-33%  Percent of RIGHT lung opacification: 1-33%  Change in lung opacification from most recent x-ray (<=3 days): No Prior  Change from prior dated 3 or more days (same admission): Decrease    Assessment and Plan:   43 y/o woman with morbid obesity, Hypothyroidism and Depression was admitted on 4/8 with SOB, cough and congestion. She has a positive COVID19 test.   She got worse with respiratory failure and intubated on 4/14.  She has extensive bilateral airspace opacities may represent multifocal pneumonia.  ID has been called for possible bacterial superinfection due to fever, leukocytosis and increased PCT.   I doubt that she has bacterial infection, CRP is going down, passed the interleukin storm.    COVID 19 + infection  Viral pneumonia  Acute hypoxic respiratory failure    - Blood culture 4/7  - Blood culture from 4/22 NGTD  - Sputum negative 4/22  - Airborne and contact isolation   - COVID 19 PCR positive   - CXR with multifocal opacities mild clearing in left side.   - Procalcitonin 0.1-->0.27  - CRP=8.19-->1.71, Ferritin and LDH high   - Completed Hydroxychloroquine   - Check CBC with diff, CMP with LFT's, Inflammatory markers (D-dimer, CRP, Ferritin, LDH,  procalcitonin)  q48h.    - Avoid antibiotics unless there is a concern for a bacterial infection  - S/P steroid  - Trend WBC 27K-->20 high possible due to steroid   - Since cultures are negative and procal is low, can stop zosyn.     Will follow.

## 2020-04-24 NOTE — PROGRESS NOTE ADULT - SUBJECTIVE AND OBJECTIVE BOX
Patient is a 44y old  Female who presents with a chief complaint of SOB (24 Apr 2020 13:36)      BRIEF HOSPITAL COURSE: 44y Female   43 y/o female with pmhx of obesity, depression, hypothyroidism admitted on 4/8 with respiratory distress found to be positive for COVID-19. Admitted to floors initially then developed acute hypoxic respiratory failure on 4/14 requiring intubation with hospital course complicated by ARDS.  Events last 24 hours:    HIT antibody positive. Switched to argatroban from heparin drip. Serotonin assay is pending. She remains on high vent setting. No vasopressor support . Electrolytes being repleted. Urine is concentrated but adequate. WBC count continue to trend up, Sputum cultures with no organism seen, Procalcitonin is pending.   She is Versed 0.1 mg/kg and Dilaudid 2 mg/hr. HIT antibody positive. Switched to argatroban from heparin drip. Serotonin assay is pending. She remains on high vent setting. No vasopressor support . Electrolytes being repleted. Urine is concentrated but adequate. WBC count continue to trend up, Sputum cultures with no organism seen  PAST MEDICAL & SURGICAL HISTORY:  Depression  Hypothyroidism        Hosp day #16d    Vent day #  Mode: AC/ CMV (Assist Control/ Continuous Mandatory Ventilation), RR (machine): 30, TV (machine): 320, FiO2: 100, PEEP: 14, ITime: 0.7, MAP: 15, PIP: 28      Vital signs / Reviewed and Physical Exam Performed where pertinent and urgently required      T(C): 39 (04-24-20 @ 12:12), Max: 39 (04-24-20 @ 12:12)  HR: 105 (04-24-20 @ 09:19) (84 - 105)  BP: --  RR: 32 (04-24-20 @ 08:00) (18 - 34)  SpO2: 90% (04-24-20 @ 09:19) (90% - 95%)  04-23-20 @ 07:01  -  04-24-20 @ 07:00  --------------------------------------------------------  IN: 1969 mL / OUT: 635 mL / NET: 1334 mL    04-24-20 @ 07:01  -  04-24-20 @ 14:48  --------------------------------------------------------  IN: 62.1 mL / OUT: 75 mL / NET: -12.9 mL      Lab / Radiology  studies / ABG / Meds -  reviewed and interpreted into the assessment and treatment plan.  acetaminophen   Tablet .. 650 milliGRAM(s) Oral every 4 hours PRN  argatroban Infusion 2 MICROgram(s)/kG/Min IV Continuous <Continuous>  artificial  tears Solution 1 Drop(s) Both EYES two times a day  chlorhexidine 0.12% Liquid 15 milliLiter(s) Oral Mucosa every 12 hours  chlorhexidine 2% Cloths 1 Application(s) Topical daily  dextrose 40% Gel 15 Gram(s) Oral once PRN  dextrose 5%. 1000 milliLiter(s) IV Continuous <Continuous>  dextrose 50% Injectable 12.5 Gram(s) IV Push once  dextrose 50% Injectable 25 Gram(s) IV Push once  dextrose 50% Injectable 25 Gram(s) IV Push once  glucagon  Injectable 1 milliGRAM(s) IntraMuscular once PRN  HYDROmorphone Infusion. 1 mG/Hr IV Continuous <Continuous>  insulin lispro (HumaLOG) corrective regimen sliding scale   SubCutaneous every 6 hours  levothyroxine Injectable 50 MICROGram(s) IV Push at bedtime  midazolam Infusion 0.02 mG/kG/Hr IV Continuous <Continuous>  midodrine 15 milliGRAM(s) Oral every 8 hours  norepinephrine Infusion 0.05 MICROgram(s)/kG/Min IV Continuous <Continuous>  pantoprazole  Injectable 40 milliGRAM(s) IV Push daily  PARoxetine 10 milliGRAM(s) Oral daily      Assessment/Plan/Therapeutic interventions  Problems:  1) Acute Hypoxic respiratory failure related to COVID-19 infection, Viral PNA with associatated ARDS  2) ICU Delirium     Neuro - Sedation neuromuscular blockade to facilitate safe ventilation/ Current Sedation strategy includes Dilaudid versed and levo     CV -  Pressor support as needed to maintain MAP 65           Avoiding fluid challenges             Pulm -  ARDS-NET 4-6cc/kg IBW TV as able to maintain plateau pressures <30               Prone ventilation consideration as feasible  Pa02/Fi02 < 150 on Fi02 >60% and PEEP at least 5                Current FIO2/PEEP requirement is 100%/14               Vent bundle Reviewed     GI -  PPI  Enteric feeds as tolerated in tandem with NMB and prone ventilation    Renal - Even to negative fluid balance as tolerated by hemodynamics and renal fx.  Feeds to be provided in lieu of IVF.                 Heme -  Pharmacologic DVT PPx  in addition to SCD's, HIgher dose of Lovenox being administered given prothrombotic nature of COVID infection    ID - ABX discontinuation based on discussion with ID in conjunction with clinical features, culture data, and judicious procalcitonin monitoring.  Completed course of Plaquenil/Azithromyzin    Endo -  Aggressive glycemic control to limit FS glucose to < 180mg/dl. Cont Synthroid for Hypothyroid         COVID 19 specific considerations and therapeutic  options based on the available and rapidly changing literature    Goals of care considerations:  Ongoing assessment for patient specific treatment options based on progression or decline.  I have involved the family with updates and requests in guidance for medical decision making.            42  Minutes of critical care time spent in the management of this critically ill COVID-19 patient/PUI patient with continuous assessments and interventions based on the interpretation of multiple databases.

## 2020-04-24 NOTE — PROVIDER CONTACT NOTE (EICU) - ACTION/TREATMENT ORDERED:
-have entered AM labs for this patient including CBC, BMP, magnesium and phosphorous levels  -will CTM for results and replete as per [place] standard
MONICA khan/bahman trotter
A.M labs ordered.
MONICA presley ordered
MONICA presley/mag/phos ordered
30mMol IV sodium phosphate ordered for repletion with repeat bmp/phos at 14hr. Discussed with Dr. Todd

## 2020-04-24 NOTE — CONSULT NOTE ADULT - SUBJECTIVE AND OBJECTIVE BOX
44 year old female with hypothyroidism who was admitted to the hospital due to covid-19 pneumonia and respiratory failure and was intubated on 4/14/2020.   She was put on lovenox 40mg bid from 4/8/20-4/14/20 but was found to covid-19+ so her DVT prophylaxis dose was increased to 80mg BID. Her platelet count was 261k on 4/15/2020.      have a LE DVT on 4/21/2020. Sh 44 year old female with hypothyroidism who was admitted to the hospital due to covid-19 pneumonia and respiratory failure and was intubated on 4/14/2020.   She was put on lovenox 40mg bid from 4/8/20-4/14/20 but was found to covid-19+ so her DVT prophylaxis dose was increased to 80mg BID. Her platelet count was 261k on 4/15/2020.      have a LE DVT on 4/21/2020. She      PAST MEDICAL & SURGICAL HISTORY:  Depression  Hypothyroidism    Social History:  Denies any etoh, smoking or illicit drugs (08 Apr 2020 02:47)    MEDICATIONS  (STANDING):  argatroban Infusion 2 MICROgram(s)/kG/Min (10.2 mL/Hr) IV Continuous <Continuous>  artificial  tears Solution 1 Drop(s) Both EYES two times a day  chlorhexidine 0.12% Liquid 15 milliLiter(s) Oral Mucosa every 12 hours  chlorhexidine 2% Cloths 1 Application(s) Topical daily  dextrose 5%. 1000 milliLiter(s) (50 mL/Hr) IV Continuous <Continuous>  dextrose 50% Injectable 12.5 Gram(s) IV Push once  dextrose 50% Injectable 25 Gram(s) IV Push once  dextrose 50% Injectable 25 Gram(s) IV Push once  HYDROmorphone Infusion. 1 mG/Hr (1 mL/Hr) IV Continuous <Continuous>  insulin lispro (HumaLOG) corrective regimen sliding scale   SubCutaneous every 6 hours  levothyroxine Injectable 50 MICROGram(s) IV Push at bedtime  midazolam Infusion 0.02 mG/kG/Hr (1.7 mL/Hr) IV Continuous <Continuous>  midodrine 15 milliGRAM(s) Oral every 8 hours  norepinephrine Infusion 0.05 MICROgram(s)/kG/Min (3.98 mL/Hr) IV Continuous <Continuous>  pantoprazole  Injectable 40 milliGRAM(s) IV Push daily  PARoxetine 10 milliGRAM(s) Oral daily    MEDICATIONS  (PRN):  acetaminophen   Tablet .. 650 milliGRAM(s) Oral every 4 hours PRN Temp greater or equal to 38.5C (101.3F)  dextrose 40% Gel 15 Gram(s) Oral once PRN Blood Glucose LESS THAN 70 milliGRAM(s)/deciLiter  glucagon  Injectable 1 milliGRAM(s) IntraMuscular once PRN Glucose <70 milliGRAM(s)/deciLiter      Vital Signs Last 24 Hrs  T(C): 39 (24 Apr 2020 12:12), Max: 39 (24 Apr 2020 12:12)  T(F): 102.2 (24 Apr 2020 12:12), Max: 102.2 (24 Apr 2020 12:12)  HR: 114 (24 Apr 2020 12:00) (84 - 114)  BP: --  BP(mean): --  RR: 35 (24 Apr 2020 12:00) (18 - 35)  SpO2: 91% (24 Apr 2020 12:00) (90% - 95%)  Gen: nad,  ENT: on vent  CV: rrr  Abd: soft                            8.9    20.75 )-----------( 153      ( 24 Apr 2020 03:47 )             28.8       04-24    145  |  100  |  23.0<H>  ----------------------------<  106<H>  4.4   |  33.0<H>  |  0.47<L>    Ca    8.1<L>      24 Apr 2020 03:48  Phos  2.3     04-24  Mg     2.1     04-24    TPro  5.4<L>  /  Alb  2.9<L>  /  TBili  1.0  /  DBili  x   /  AST  89<H>  /  ALT  226<H>  /  AlkPhos  146<H>  04-24 44 year old female with hypothyroidism who was admitted to the hospital due to covid-19 pneumonia and respiratory failure and was intubated on 4/14/2020.   She was put on lovenox 40mg bid from 4/8/20-4/14/20 but was found to covid-19+ so her DVT prophylaxis dose was increased to 80mg BID. Her platelet count was 261k on 4/15/2020. On 4/12/2020 it was 613k but reactive and increased only once and then went back to baseline.   However, he was found to have a LE DVT on 4/21/2020. She was then put on a heparin drip from 4/21/20-4/23/2020 when the screening HIT antibody screen was positive so her AC was changed to argatroban.  Her platelet count reached a peak of 273k on 4/21/2020 and to a aroldo so far of 153k on 4/24/2020.      PAST MEDICAL & SURGICAL HISTORY:  Depression  Hypothyroidism    Social History:  Denies any etoh, smoking or illicit drugs (08 Apr 2020 02:47)    MEDICATIONS  (STANDING):  argatroban Infusion 2 MICROgram(s)/kG/Min (10.2 mL/Hr) IV Continuous <Continuous>  artificial  tears Solution 1 Drop(s) Both EYES two times a day  chlorhexidine 0.12% Liquid 15 milliLiter(s) Oral Mucosa every 12 hours  chlorhexidine 2% Cloths 1 Application(s) Topical daily  dextrose 5%. 1000 milliLiter(s) (50 mL/Hr) IV Continuous <Continuous>  dextrose 50% Injectable 12.5 Gram(s) IV Push once  dextrose 50% Injectable 25 Gram(s) IV Push once  dextrose 50% Injectable 25 Gram(s) IV Push once  HYDROmorphone Infusion. 1 mG/Hr (1 mL/Hr) IV Continuous <Continuous>  insulin lispro (HumaLOG) corrective regimen sliding scale   SubCutaneous every 6 hours  levothyroxine Injectable 50 MICROGram(s) IV Push at bedtime  midazolam Infusion 0.02 mG/kG/Hr (1.7 mL/Hr) IV Continuous <Continuous>  midodrine 15 milliGRAM(s) Oral every 8 hours  norepinephrine Infusion 0.05 MICROgram(s)/kG/Min (3.98 mL/Hr) IV Continuous <Continuous>  pantoprazole  Injectable 40 milliGRAM(s) IV Push daily  PARoxetine 10 milliGRAM(s) Oral daily    MEDICATIONS  (PRN):  acetaminophen   Tablet .. 650 milliGRAM(s) Oral every 4 hours PRN Temp greater or equal to 38.5C (101.3F)  dextrose 40% Gel 15 Gram(s) Oral once PRN Blood Glucose LESS THAN 70 milliGRAM(s)/deciLiter  glucagon  Injectable 1 milliGRAM(s) IntraMuscular once PRN Glucose <70 milliGRAM(s)/deciLiter      Vital Signs Last 24 Hrs  T(C): 39 (24 Apr 2020 12:12), Max: 39 (24 Apr 2020 12:12)  T(F): 102.2 (24 Apr 2020 12:12), Max: 102.2 (24 Apr 2020 12:12)  HR: 114 (24 Apr 2020 12:00) (84 - 114)  BP: --  BP(mean): --  RR: 35 (24 Apr 2020 12:00) (18 - 35)  SpO2: 91% (24 Apr 2020 12:00) (90% - 95%)  Gen: nad,  ENT: on vent  CV: rrr  Abd: soft                            8.9    20.75 )-----------( 153      ( 24 Apr 2020 03:47 )             28.8       04-24    145  |  100  |  23.0<H>  ----------------------------<  106<H>  4.4   |  33.0<H>  |  0.47<L>    Ca    8.1<L>      24 Apr 2020 03:48  Phos  2.3     04-24  Mg     2.1     04-24    TPro  5.4<L>  /  Alb  2.9<L>  /  TBili  1.0  /  DBili  x   /  AST  89<H>  /  ALT  226<H>  /  AlkPhos  146<H>  04-24

## 2020-04-24 NOTE — PROGRESS NOTE ADULT - SUBJECTIVE AND OBJECTIVE BOX
Patient is a 44y old  Female who presents with a chief complaint of SOB (24 Apr 2020 22:56)      BRIEF HOSPITAL COURSE: 44y female pmhx obesity, depression, hypothyroidism, found to be COVID-19 positive, admitted with respiratory distress, complicated by  development of  acute hypoxemic respiratory failure, ARDS, secondary to severe sepsis viral COVID 19 pneumonia. Hospital course complicated by development of HIT, Started on Argatroban.       Events last 24 hours:  Remains on 100% FiO2 and PEEP 18, w/ periods of hypoxia. Able to come off levophed tonight. ABG done tonight w/ minimal improvement in PaO2. PF ratio  now 66. Noted to have High TSH, low T3/T4, increased synthroid dose.  LFTs downtrending.     PAST MEDICAL & SURGICAL HISTORY:  Depression  Hypothyroidism        Hosp day #16d    Vent day # 10  Mode: AC/ CMV (Assist Control/ Continuous Mandatory Ventilation)  RR (machine): 30  TV (machine): 320  FiO2: 100  PEEP: 18  MAP: 24  PIP: 31        Vital signs / Reviewed and Physical Exam Performed where pertinent and urgently required    Lab / Radiology  studies / ABG / Meds -  reviewed and interpreted into the assessment and treatment plan.      Assessment/Plan/Therapeutic interventions    Impression:  1. Acute hypoxic respiratory failure  2. Severe ARDS  3. COVID-19  4. HIT   5. Hypothyroid  6. Shock, resolved     Neuro - Sedated w/ Dilaudid and versed. Intermittent NMB boluses.     CV -  Pressor support as needed to maintain MAP 65. Monitor end points of perfusion.            Avoiding fluid challenges           Pulm -  ARDS-NET 4-6cc/kg IBW TV as able to maintain plateau pressures <30               Prone ventilation consideration as feasible  Pa02/Fi02 < 150 on Fi02 >60% and PEEP at least 5                 Vent bundle Reviewed . High FiO2 and PEEP requirements of 100% and PEEP18. PF ratio 64.   - s/p steroids/ hydroxychlorquine     GI -  PPI  Enteric feeds as tolerated in tandem with NMB and prone ventilation    Renal - Even to negative fluid balance as tolerated by hemodynamics and renal fx.  Feeds to be provided in lieu of IVF.     Heme -  + HIT, on Argatroban. SCDs. Trend plts     ID - ABX discontinuation based on discussion with ID in conjunction with clinical features, culture data, and judicious procalcitonin monitoring.      Endo -  Aggressive glycemic control to limit FS glucose to < 180mg/dl.  - Hypothyroid increased synthroid dose tonight due to elevated TSH      COVID 19 specific considerations and therapeutic  options based on the available and rapidly changing literature    Goals of care considerations:  Ongoing assessment for patient specific treatment options based on progression or decline.  I have involved the family with updates and requests in guidance for medical decision making.          38  Minutes of critical care tiem spent in the management of this critically ill COVID-19 patient/PUI patient with continuous assessments and interventions based on the interpretation of multiple databases.

## 2020-04-25 NOTE — CHART NOTE - NSCHARTNOTEFT_GEN_A_CORE
Family (Ayde Flor) was contacted today by myself as a native German speaker. Patient status was discussed and updated were provided. All questions were answered.   Family asked that sister, who is a nurse in [facility], be contacted. Two calls were attempted via  however phone number was not in service.

## 2020-04-25 NOTE — PROGRESS NOTE ADULT - ASSESSMENT
Assessment/Plan/Therapeutic interventions    Impression:  1. Acute hypoxic respiratory failure  2. Severe ARDS  3. COVID-19  4. HIT   5. Hypothyroid  6. Shock, resolved     Neuro - Sedated w/ Dilaudid and versed. Not requiring NMB at this time.     CV -  Pressor support as needed to maintain MAP 65. Monitor end points of perfusion.            Avoiding fluid challenges         Pulm -  ARDS-NET 4-6cc/kg IBW TV as able to maintain plateau pressures <30               Vent bundle Reviewed . High FiO2 and PEEP requirements of 100% and PEEP18. PF ratio 66.                Received steroids/ hydroxychlorquine.               Received Actemra 4/14.     GI -  PPI  Enteric feeds as tolerated in tandem with NMB and prone ventilation    Renal - Even to negative fluid balance as tolerated by hemodynamics and renal fx.  Feeds to be provided in lieu of IVF.     Heme -  HIT ANNA positive but definitive AMADA negative for HIT. Discussed whether to transition to oral agents/ revert to Heparin, per CC Intensivist (Dr. Lyn) patient was maintained on Argatroban. SCDs. Continue to trend platelet count.      ID - ABX discontinuation based on discussion with ID in conjunction with clinical features, culture data, and judicious procalcitonin monitoring.    ID evaluated patient for fevers and leukocytosis. Procalcitonin WNL and in setting of negative blood cultures from 4/22, no antibiotics are warranted at this time.     Endo -  Aggressive glycemic control to limit FS glucose to < 180mg/dl.  - Synthroid increased 4/24 for elevated TSH.    COVID 19 specific considerations and therapeutic  options based on the available and rapidly changing literature    Goals of care considerations:  Ongoing assessment for patient specific treatment options based on progression or decline.  I have involved the family with updates and requests in guidance for medical decision making.

## 2020-04-25 NOTE — CHART NOTE - NSCHARTNOTEFT_GEN_A_CORE
Source: Patient [ ]  Family [ ]   other [X]    Current Diet: Diet, NPO with Tube Feed:   Tube Feeding Modality: Orogastric  Glucerna 1.5 Juan  Total Volume for 24 Hours (mL): 720  Continuous  Starting Tube Feed Rate {mL per Hour}: 30  Until Goal Tube Feed Rate (mL per Hour): 30  Tube Feed Duration (in Hours): 24  Tube Feed Start Time: 07:00     PO intake:  < 50% [ ]   50-75%  [ ]   %  [ ]  other : Tube Feed    Source for PO intake [ ] Patient [ ] family [X] chart [ ] staff [ ] other    Enteral  Nutrition: Current diet order Glucerna 1.5 30mL/hr (x20hr) provides 600mL, 900kcal, 50g protein, 456mL free water. Additional free water per MD discretion.     Current Weight:   4/7/2020: 187.3#  No new wt recorded. 2+/3+ edema noted. Obtain daily weights to monitor trends.    Pertinent Medications: MEDICATIONS  (STANDING):  argatroban Infusion 2 MICROgram(s)/kG/Min (10.2 mL/Hr) IV Continuous <Continuous>  artificial  tears Solution 1 Drop(s) Both EYES two times a day  chlorhexidine 0.12% Liquid 15 milliLiter(s) Oral Mucosa every 12 hours  chlorhexidine 2% Cloths 1 Application(s) Topical daily  dextrose 5%. 1000 milliLiter(s) (50 mL/Hr) IV Continuous <Continuous>  dextrose 50% Injectable 12.5 Gram(s) IV Push once  dextrose 50% Injectable 25 Gram(s) IV Push once  dextrose 50% Injectable 25 Gram(s) IV Push once  HYDROmorphone Infusion. 1 mG/Hr (1 mL/Hr) IV Continuous <Continuous>  insulin lispro (HumaLOG) corrective regimen sliding scale   SubCutaneous every 6 hours  levothyroxine Injectable 100 MICROGram(s) IV Push at bedtime  midazolam Infusion 0.02 mG/kG/Hr (1.7 mL/Hr) IV Continuous <Continuous>  midodrine 15 milliGRAM(s) Oral every 8 hours  norepinephrine Infusion 0.05 MICROgram(s)/kG/Min (3.98 mL/Hr) IV Continuous <Continuous>  pantoprazole  Injectable 40 milliGRAM(s) IV Push daily  PARoxetine 10 milliGRAM(s) Oral daily    MEDICATIONS  (PRN):  acetaminophen   Tablet .. 650 milliGRAM(s) Oral every 4 hours PRN Temp greater or equal to 38.5C (101.3F)  dextrose 40% Gel 15 Gram(s) Oral once PRN Blood Glucose LESS THAN 70 milliGRAM(s)/deciLiter  glucagon  Injectable 1 milliGRAM(s) IntraMuscular once PRN Glucose <70 milliGRAM(s)/deciLiter    Pertinent Labs: CBC Full  -  ( 25 Apr 2020 07:05 )  WBC Count : 19.66 K/uL  RBC Count : 3.23 M/uL  Hemoglobin : 9.3 g/dL  Hematocrit : 30.7 %  Platelet Count - Automated : 170 K/uL  Mean Cell Volume : 95.0 fl  Mean Cell Hemoglobin : 28.8 pg  Mean Cell Hemoglobin Concentration : 30.3 gm/dL  Auto Neutrophil # : 15.85 K/uL  Auto Lymphocyte # : 1.40 K/uL  Auto Monocyte # : 1.40 K/uL  Auto Eosinophil # : 0.25 K/uL  Auto Basophil # : 0.08 K/uL  Auto Neutrophil % : 80.6 %  Auto Lymphocyte % : 7.1 %  Auto Monocyte % : 7.1 %  Auto Eosinophil % : 1.3 %  Auto Basophil % : 0.4 %    Labs: 04-25 Na146 mmol/L<H> Glu 81 mg/dL K+ 4.9 mmol/L Cr  0.54 mg/dL BUN 23.0 mg/dL<H> Phos 3.5 mg/dL Alb 3.0 g/dL<L> PAB n/a       Skin: As per EMR, L. heel suspected deep tissue injury; unstageable L. chin pressure injury; unstageable bilateral cheek pressure injury.    Nutrition focused physical exam not conducted at this time- found signs of malnutrition [ ]absent [ ]present    Subcutaneous fat loss: [ ] Orbital fat pads region, [ ]Buccal fat region, [ ]Triceps region,  [ ]Ribs region    Muscle wasting: [ ]Temples region, [ ]Clavicle region, [ ]Shoulder region, [ ]Scapula region, [ ]Interosseous region,  [ ]thigh region, [ ]Calf region    Estimated Needs:   [X] no change since previous assessment  [ ] recalculated:     Current Nutrition Diagnosis:  Pt remains at nutrition risk secondary to increased nutrient needs related to increased physiological demand to maintain nutrition status as evidenced by acute resp. failure 2/2 COVID PNA. Pt remains sedated. Continue to monitor tolerance to tube feeds. Will monitor labs. RD to remain available.     Recommendations:   1. Suggest increasing tube feeds to better meet pt needs: Glucerna 1.5 10mL q6 hr to goal rate 50mL/hr (x20hr) to provide 1000mL, 1500kcal, 82.5g protein, 760mL free water. Additional free water per MD discretion. Add Prostat BID which provides an additional 100kcal and 15g protein per serving.   2. Monitor tolerance to tube feeds.  3. Recommend MVI, Vitamin C, zinc sulfate (220mg) x 10 days to aid in wound healing.  4. Obtain new weights as feasible and monitor labs.     Monitoring and Evaluation:   [ ] PO intake [X] Tolerance to diet prescription [X] Weights  [X] Follow up per protocol [X] Labs

## 2020-04-25 NOTE — PROGRESS NOTE ADULT - SUBJECTIVE AND OBJECTIVE BOX
44 year old female with hypothyroidism who was admitted to the hospital due to covid-19 pneumonia and respiratory failure and was intubated on 4/14/2020.   She was put on lovenox 40mg bid from 4/8/20-4/14/20 but was found to covid-19+ so her DVT prophylaxis dose was increased to 80mg BID. Her platelet count was 261k on 4/15/2020. On 4/12/2020 it was 613k but reactive and increased only once and then went back to baseline.   However, he was found to have a LE DVT on 4/21/2020. She was then put on a heparin drip from 4/21/20-4/23/2020 when the screening HIT antibody screen was positive so her AC was changed to argatroban.  Her platelet count reached a peak of 273k on 4/21/2020 and to a aroldo so far of 153k on 4/24/2020.  plts today 170  no bleeding reported   pt intubated     PAST MEDICAL & SURGICAL HISTORY:  Depression  Hypothyroidism    Social History:  Denies any etoh, smoking or illicit drugs (08 Apr 2020 02:47)    MEDICATIONS  (STANDING):  argatroban Infusion 2 MICROgram(s)/kG/Min (10.2 mL/Hr) IV Continuous <Continuous>  artificial  tears Solution 1 Drop(s) Both EYES two times a day  chlorhexidine 0.12% Liquid 15 milliLiter(s) Oral Mucosa every 12 hours  chlorhexidine 2% Cloths 1 Application(s) Topical daily  dextrose 5%. 1000 milliLiter(s) (50 mL/Hr) IV Continuous <Continuous>  dextrose 50% Injectable 12.5 Gram(s) IV Push once  dextrose 50% Injectable 25 Gram(s) IV Push once  dextrose 50% Injectable 25 Gram(s) IV Push once  HYDROmorphone Infusion. 1 mG/Hr (1 mL/Hr) IV Continuous <Continuous>  insulin lispro (HumaLOG) corrective regimen sliding scale   SubCutaneous every 6 hours  levothyroxine Injectable 100 MICROGram(s) IV Push at bedtime  midazolam Infusion 0.02 mG/kG/Hr (1.7 mL/Hr) IV Continuous <Continuous>  midodrine 15 milliGRAM(s) Oral every 8 hours  norepinephrine Infusion 0.05 MICROgram(s)/kG/Min (3.98 mL/Hr) IV Continuous <Continuous>  pantoprazole  Injectable 40 milliGRAM(s) IV Push daily  PARoxetine 10 milliGRAM(s) Oral daily    MEDICATIONS  (PRN):  acetaminophen   Tablet .. 650 milliGRAM(s) Oral every 4 hours PRN Temp greater or equal to 38.5C (101.3F)  dextrose 40% Gel 15 Gram(s) Oral once PRN Blood Glucose LESS THAN 70 milliGRAM(s)/deciLiter  glucagon  Injectable 1 milliGRAM(s) IntraMuscular once PRN Glucose <70 milliGRAM(s)/deciLiter        Vital Signs Last 24 Hrs  Vital Signs Last 24 Hrs  T(C): 37 (25 Apr 2020 12:13), Max: 39.5 (24 Apr 2020 15:55)  T(F): 98.6 (25 Apr 2020 12:13), Max: 103.1 (24 Apr 2020 15:55)  HR: 92 (25 Apr 2020 12:00) (91 - 129)  BP: --  BP(mean): --  RR: 16 (25 Apr 2020 12:00) (16 - 30)  SpO2: 92% (25 Apr 2020 12:00) (89% - 95%)  Gen: nad,  ENT: on vent  CV: rrr  Abd: soft                          9.3    19.66 )-----------( 170      ( 25 Apr 2020 07:05 )             30.7                           8.9    20.75 )-----------( 153      ( 24 Apr 2020 03:47 )             28.8       04-24    145  |  100  |  23.0<H>  ----------------------------<  106<H>  4.4   |  33.0<H>  |  0.47<L>    Ca    8.1<L>      24 Apr 2020 03:48  Phos  2.3     04-24  Mg     2.1     04-24    TPro  5.4<L>  /  Alb  2.9<L>  /  TBili  1.0  /  DBili  x   /  AST  89<H>  /  ALT  226<H>  /  AlkPhos  146<H>  04-24

## 2020-04-25 NOTE — PROGRESS NOTE ADULT - SUBJECTIVE AND OBJECTIVE BOX
Subjective: Remains on high FiO2 / PEEP requirement without much improvement. Guarded prognosis.   Unable to obtain appropriate HPI as patient is sedated and intubated.     Vital Signs:  Vital Signs Last 24 Hrs  T(C): 37.7 (04-25-20 @ 07:49), Max: 39.5 (04-24-20 @ 15:55)  T(F): 99.9 (04-25-20 @ 07:49), Max: 103.1 (04-24-20 @ 15:55)  HR: 91 (04-25-20 @ 08:00) (91 - 129)  BP: --  RR: 16 (04-25-20 @ 08:00) (16 - 30)  SpO2: 92% (04-25-20 @ 08:00) (89% - 95%) on (O2)    Telemetry/Alarms: Reviewed     Relevant labs, radiology and Medications reviewed    Pertinent Physical Exam  Neuro: intubated and sedated to RASS-3 to -4  HEENT: PERRL, EOMI, oral mucosa pink and moist, +OGT and ETT in place   Neck: supple, no JVD  CV: regular rate, regular rhythm, +S1S2, no murmurs or rub  Pulm/chest: coarse breath sounds bilaterally at bases  Abd: soft, NT, ND, +BS  Ext: Pulses equal b/l, Pulses 2+ in distal extremeties   Skin: warm, well perfused     04-24 @ 07:01  -  04-25 @ 07:00  --------------------------------------------------------  IN:    argatroban Infusion: 224.4 mL    Enteral Tube Flush: 180 mL    Glucerna 1.5: 660 mL    HYDROmorphone Infusion.: 44 mL    midazolam Infusion: 207.4 mL  Total IN: 1315.8 mL    OUT:    Indwelling Catheter - Urethral: 1095 mL  Total OUT: 1095 mL    Total NET: 220.8 mL      04-25 @ 07:01  -  04-25 @ 12:14  --------------------------------------------------------  IN:    argatroban Infusion: 10 mL    Glucerna 1.5: 30 mL    HYDROmorphone Infusion.: 2 mL    midazolam Infusion: 10 mL  Total IN: 52 mL    OUT:    Indwelling Catheter - Urethral: 125 mL  Total OUT: 125 mL    Total NET: -73 mL

## 2020-04-25 NOTE — PROGRESS NOTE ADULT - SUBJECTIVE AND OBJECTIVE BOX
[facility]  INFECTIOUS DISEASES AND INTERNAL MEDICINE at [place]  =======================================================  Kamran Leija MD  Diplomates American Board of Internal Medicine and Infectious Diseases  =======================================================    [id number]  BAUDILIO SHAH     Follow up: respiratory failure, covid19    On FIO2 100% with sat around 94, not doing well.   Febrile with cooling blanket    PAST MEDICAL & SURGICAL HISTORY:  Depression  Hypothyroidism    Social Hx: unknown    FAMILY HISTORY: unknown     Allergies  No Known Allergies    Antibiotics:  piperacillin/tazobactam IVPB.. 3.375 Gram(s) IV Intermittent every 8 hours    REVIEW OF SYSTEMS:  Unable to answer     Physical Exam:  Vital Signs Last 24 Hrs  T(C): 37.7 (25 Apr 2020 07:49), Max: 39.5 (24 Apr 2020 15:55)  T(F): 99.9 (25 Apr 2020 07:49), Max: 103.1 (24 Apr 2020 15:55)  HR: 91 (25 Apr 2020 08:00) (91 - 129)  BP: --  BP(mean): --  RR: 16 (25 Apr 2020 08:00) (16 - 35)  SpO2: 92% (25 Apr 2020 08:00) (89% - 95%)  GEN: NAD  HEENT: normocephalic and atraumatic. EOMI. PERRL.    NECK: Supple.  No lymphadenopathy   LUNGS: Clear to auscultation.  HEART: Regular rate and rhythm   ABDOMEN: Soft, nontender, and nondistended.  Positive bowel sounds.    EXTREMITIES: Without edema.  NEUROLOGIC: grossly intact.  PSYCHIATRIC: Appropriate affect .  SKIN: No rash    Labs:  04-25    146<H>  |  101  |  23.0<H>  ----------------------------<  81  4.9   |  33.0<H>  |  0.54    Ca    8.1<L>      25 Apr 2020 07:05  Phos  3.5     04-25  Mg     2.3     04-25    TPro  5.7<L>  /  Alb  3.0<L>  /  TBili  0.7  /  DBili  x   /  AST  61<H>  /  ALT  169<H>  /  AlkPhos  146<H>  04-25                        9.3    19.66 )-----------( 170      ( 25 Apr 2020 07:05 )             30.7     PTT - ( 24 Apr 2020 07:16 )  PTT:62.4 sec    LIVER FUNCTIONS - ( 25 Apr 2020 07:05 )  Alb: 3.0 g/dL / Pro: 5.7 g/dL / ALK PHOS: 146 U/L / ALT: 169 U/L / AST: 61 U/L / GGT: x           ABG - ( 24 Apr 2020 22:44 )  pH, Arterial: 7.32  pH, Blood: x     /  pCO2: 68    /  pO2: 66    / HCO3: 31    / Base Excess: 7.5   /  SaO2: 92        RECENT CULTURES:  04-22 @ 03:40 .Sputum Sputum     Normal Respiratory Fina present    Moderate polymorphonuclear leukocytes per low power field  No Squamous epithelial cells per low power field  No organisms seen per oil power field    04-21 @ 15:44 .Blood Blood-Peripheral     No growth at 48 hours      All imaging and other data have been reviewed.  < from: Xray Chest 1 View- PORTABLE-Urgent (04.21.20 @ 20:24) >   EXAM:  XR CHEST PORTABLE URGENT 1V                        PROCEDURE DATE:  04/21/2020    INTERPRETATION:  History: COVID pneumonia and hypoxia  Portable radiograph of the chest is performed. comparison is made to 4/14/2020  ET tube NG tube and right IJ line are again noted. Patchy mid to lower lung infiltrates are mildly improved on the left. There is no pleural effusion. A right midline is again seen.  Impression: Mild improvement in left-sided infiltrate.  DISCRETE X-RAY DATA:  Percent of LEFT lung opacification: 1-33%  Percent of RIGHT lung opacification: 1-33%  Change in lung opacification from most recent x-ray (<=3 days): No Prior  Change from prior dated 3 or more days (same admission): Decrease    Assessment and Plan:   43 y/o woman with morbid obesity, Hypothyroidism and Depression was admitted on 4/8 with SOB, cough and congestion. She has a positive COVID19 test.   She got worse with respiratory failure and intubated on 4/14.  She has extensive bilateral airspace opacities may represent multifocal pneumonia.  ID has been called for possible bacterial superinfection due to fever, leukocytosis and increased PCT.   I doubt that she has bacterial infection, CRP is going down, passed the interleukin storm.    COVID 19 + infection  Viral pneumonia  Acute hypoxic respiratory failure  Transaminitis     - Blood culture 4/7 and 4/22 NGTD  - Sputum negative 4/22  - Airborne and contact isolation   - COVID 19 PCR positive   - CXR with multifocal opacities mild clearing in left side.   - Procalcitonin 0.1-->0.27-->0.23  - CRP=8.19-->1.71, Ferritin and LDH high   - Completed Hydroxychloroquine   - Check CBC with diff, CMP with LFT's, Inflammatory markers (D-dimer, CRP, Ferritin, LDH,  procalcitonin)  q48h.    - S/P steroid  - Trend WBC 27K-->19 high possible due to steroid   - Since cultures are negative and procal is low, will hold on antibiotics   - S/P one dose of Actemra 400mg on 4/14    Will follow.

## 2020-04-26 NOTE — PROGRESS NOTE ADULT - SUBJECTIVE AND OBJECTIVE BOX
Patient is a 44y old  Female who presents with a chief complaint of SOB (25 Apr 2020 15:21)      BRIEF HOSPITAL COURSE:  Remains on high FiO2 / PEEP requirement without much improvement.  Poor prognosis.       Events last 24 hours: No Change    PAST MEDICAL & SURGICAL HISTORY:  Depression  Hypothyroidism        Physical Examination:    General: Comfortable, intubated. No acute distress.      Neuro: intubated and sedated to RASS-3 to -4  HEENT: PERRL, EOMI, oral mucosa pink and moist, +OGT and ETT in place   Neck: supple, no JVD  CV: regular rate, regular rhythm, +S1S2, no murmurs or rub  Pulm/chest: coarse breath sounds bilaterally at bases  Abd: soft, NT, ND, +BS  Ext: Pulses equal b/l, Pulses 2+ in distal extremeties   Skin: warm, well perfused      Medications:    midodrine 15 milliGRAM(s) Oral every 8 hours  norepinephrine Infusion 0.05 MICROgram(s)/kG/Min IV Continuous <Continuous>      acetaminophen   Tablet .. 650 milliGRAM(s) Oral every 4 hours PRN  HYDROmorphone Infusion. 1 mG/Hr IV Continuous <Continuous>  midazolam Infusion 0.02 mG/kG/Hr IV Continuous <Continuous>  PARoxetine 10 milliGRAM(s) Oral daily      argatroban Infusion 2 MICROgram(s)/kG/Min IV Continuous <Continuous>    pantoprazole  Injectable 40 milliGRAM(s) IV Push daily      dextrose 40% Gel 15 Gram(s) Oral once PRN  dextrose 50% Injectable 12.5 Gram(s) IV Push once  dextrose 50% Injectable 25 Gram(s) IV Push once  dextrose 50% Injectable 25 Gram(s) IV Push once  glucagon  Injectable 1 milliGRAM(s) IntraMuscular once PRN  insulin lispro (HumaLOG) corrective regimen sliding scale   SubCutaneous every 6 hours  levothyroxine Injectable 100 MICROGram(s) IV Push at bedtime    dextrose 5%. 1000 milliLiter(s) IV Continuous <Continuous>      artificial  tears Solution 1 Drop(s) Both EYES two times a day  chlorhexidine 0.12% Liquid 15 milliLiter(s) Oral Mucosa every 12 hours  chlorhexidine 2% Cloths 1 Application(s) Topical daily        Mode: AC/ CMV (Assist Control/ Continuous Mandatory Ventilation)  RR (machine): 34  TV (machine): 350  FiO2: 100  PEEP: 18  MAP: 25  PIP: 46      ICU Vital Signs Last 24 Hrs  T(C): 39.6 (26 Apr 2020 08:50), Max: 39.6 (26 Apr 2020 08:50)  T(F): 103.2 (26 Apr 2020 08:50), Max: 103.2 (26 Apr 2020 08:50)  HR: 113 (26 Apr 2020 08:00) (92 - 115)  BP: --  BP(mean): --  ABP: 114/72 (26 Apr 2020 08:00) (90/63 - 114/72)  ABP(mean): 88 (26 Apr 2020 08:00) (68 - 88)  RR: 14 (26 Apr 2020 08:00) (14 - 17)  SpO2: 95% (26 Apr 2020 08:00) (92% - 96%)    Vital Signs Last 24 Hrs  T(C): 39.6 (26 Apr 2020 08:50), Max: 39.6 (26 Apr 2020 08:50)  T(F): 103.2 (26 Apr 2020 08:50), Max: 103.2 (26 Apr 2020 08:50)  HR: 113 (26 Apr 2020 08:00) (92 - 115)  BP: --  BP(mean): --  RR: 14 (26 Apr 2020 08:00) (14 - 17)  SpO2: 95% (26 Apr 2020 08:00) (92% - 96%)    ABG - ( 26 Apr 2020 05:26 )  pH, Arterial: 7.37  pH, Blood: x     /  pCO2: 68    /  pO2: 75    / HCO3: 35    / Base Excess: 11.3  /  SaO2: 95                  I&O's Detail    25 Apr 2020 07:01  -  26 Apr 2020 07:00  --------------------------------------------------------  IN:    argatroban Infusion: 112 mL    Enteral Tube Flush: 170 mL    Glucerna 1.5: 600 mL    HYDROmorphone Infusion.: 40 mL    midazolam Infusion: 202.2 mL  Total IN: 1124.2 mL    OUT:    Indwelling Catheter - Urethral: 960 mL  Total OUT: 960 mL    Total NET: 164.2 mL      26 Apr 2020 07:01  -  26 Apr 2020 11:05  --------------------------------------------------------  IN:    argatroban Infusion: 10.2 mL    Glucerna 1.5: 30 mL    HYDROmorphone Infusion.: 2 mL    midazolam Infusion: 10.2 mL  Total IN: 52.4 mL    OUT:    Indwelling Catheter - Urethral: 125 mL  Total OUT: 125 mL    Total NET: -72.6 mL            LABS:                        9.2    16.13 )-----------( 178      ( 26 Apr 2020 05:59 )             30.4     04-26    143  |  98  |  24.0<H>  ----------------------------<  101<H>  4.9   |  35.0<H>  |  0.46<L>    Ca    8.8      26 Apr 2020 05:59  Phos  2.7     04-26  Mg     2.3     04-26    TPro  6.1<L>  /  Alb  3.1<L>  /  TBili  0.6  /  DBili  x   /  AST  42<H>  /  ALT  119<H>  /  AlkPhos  140<H>  04-26          CAPILLARY BLOOD GLUCOSE      POCT Blood Glucose.: 119 mg/dL (26 Apr 2020 05:31)        CULTURES:  Culture Results:   No growth at 48 hours (04-23 @ 17:20)  Culture Results:   Normal Respiratory Fina present (04-22 @ 03:40)  Culture Results:   No growth at 48 hours (04-21 @ 15:44)

## 2020-04-26 NOTE — PROGRESS NOTE ADULT - ASSESSMENT
Assessment and Plan:   · Assessment		  Assessment/Plan/Therapeutic interventions    Impression:  1. Acute hypoxic respiratory failure  2. Severe ARDS  3. COVID-19  4. HIT   5. Hypothyroid  6. Shock, resolved     Neuro - Sedated w/ Dilaudid and versed. Not requiring NMB at this time.     CV -  Pressor support as needed to maintain MAP 65. Monitor end points of perfusion.            Avoiding fluid challenges         Pulm -  ARDS-NET 4-6cc/kg IBW TV as able to maintain plateau pressures <30               Vent bundle Reviewed . High FiO2 and PEEP requirements of 100% and PEEP18. PF ratio 66.                Received steroids/ hydroxychlorquine.               Received Actemra 4/14.     GI -  PPI  Enteric feeds as tolerated in tandem with NMB and prone ventilation    Renal - Even to negative fluid balance as tolerated by hemodynamics and renal fx.  Feeds to be provided in lieu of IVF.     Heme -  HIT ANNA positive but definitive AMADA negative for HIT. Discussed whether to transition to oral agents/ revert to Heparin, per CC Intensivist (Dr. Lyn) patient was maintained on Argatroban. SCDs. Continue to trend platelet count.      ID - ABX discontinuation based on discussion with ID in conjunction with clinical features, culture data, and judicious procalcitonin monitoring.    ID evaluated patient for fevers and leukocytosis. Procalcitonin WNL and in setting of negative blood cultures from 4/22, no antibiotics are warranted at this time.     Endo -  Aggressive glycemic control to limit FS glucose to < 180mg/dl.  - Synthroid increased 4/24 for elevated TSH.    COVID 19 specific considerations and therapeutic  options based on the available and rapidly changing literature    Goals of care considerations:  Ongoing assessment for patient specific treatment options based on progression or decline.  I have involved the family with updates and requests in guidance for medical decision making.  I spoke to her sister, Tanika Lindsay today at 10:45 am together with attending physician Dr Edmar Monet.  I am fluent in [place] and have certification as official .  We spent 10 minutes discussing the fact that Ayde is not improved at all despite maximum intervention, and based on this plus prolonged ventilator dependence, we are recommending that family come to see Ayde today.  We let sister know that patient will not survive this illness, and Tanika states she will come today at 3 pm accompanied by her brother. Assessment and Plan:   · Assessment		  Assessment/Plan/Therapeutic interventions    Impression:  1. Acute hypoxic respiratory failure  2. Severe ARDS  3. COVID-19  4. HIT   5. Hypothyroid  6. Shock, resolved     Neuro - Sedated w/ Dilaudid and versed. Not requiring NMB at this time.     CV -  Pressor support as needed to maintain MAP 65. Monitor end points of perfusion.            Avoiding fluid challenges         Pulm -  ARDS-NET 4-6cc/kg IBW TV as able to maintain plateau pressures <30               Vent bundle Reviewed . High FiO2 and PEEP requirements of 100% and PEEP18. PF ratio 66.                Received steroids/ hydroxychlorquine.               Received Actemra 4/14.     GI -  PPI  Enteric feeds as tolerated in tandem with NMB and prone ventilation    Renal - Even to negative fluid balance as tolerated by hemodynamics and renal fx.  Feeds to be provided in lieu of IVF.     Heme -  HIT ANNA positive but definitive AMADA negative for HIT. Discussed whether to transition to oral agents/ revert to Heparin, per [facility] Intensivist (Dr. Lyn) patient was maintained on Argatroban. SCDs. Continue to trend platelet count.      ID - ABX discontinuation based on discussion with ID in conjunction with clinical features, culture data, and judicious procalcitonin monitoring.    ID evaluated patient for fevers and leukocytosis. Procalcitonin WNL and in setting of negative blood cultures from 4/22, no antibiotics are warranted at this time.     Endo -  Aggressive glycemic control to limit FS glucose to < 180mg/dl.  - Synthroid increased 4/24 for elevated TSH.    COVID 19 specific considerations and therapeutic  options based on the available and rapidly changing literature    Goals of care considerations:  Ongoing assessment for patient specific treatment options based on progression or decline.  I have involved the family with updates and requests in guidance for medical decision making.  I spoke to her sister, Tanika Lindsay today at 10:45 am together with attending physician Dr Edmar Monet.  I am fluent in [place] and have certification as official .  We spent 10 minutes discussing the fact that Ayde is not improved at all despite maximum intervention, and based on this plus prolonged ventilator dependence, prone positioning x 4,  we are recommending that family come to see Ayde today.  We let sister know that patient will not likely survive this illness, chances approximately 10 percent, and Tanika states she will come today at 3 pm accompanied by her brother.   She does not agree with DNR/DNI right now and asks that patient be maintained on ventilator.     At 1230, Dr Monet had a lengthy conversation with family friend Dr. Harris [phone], after verbal permission from sister Tanika.  He updated her on the above status, recent labs, radiology studies.  She states she will discuss with the family in order to provide realistic expectations.  She does not agree with DNR/DNI right now and asks that patient be maintained on ventilator.     At 1245, patient's brother accompanied by sister Tanika calls, asks that we maintain ventilator and states he will come today.

## 2020-04-26 NOTE — PROVIDER CONTACT NOTE (OTHER) - ACTION/TREATMENT ORDERED:
AM labs ordered including CBC, CMP, MG, PHOS, Procalcitonin, C-RP, D-dimer, ferritin and ABG. AM labs ordered including TSH, T4, CBC, CMP, MG, PHOS, Procalcitonin, C-RP, D-dimer, ferritin and ABG.

## 2020-04-27 NOTE — CONSULT NOTE ADULT - SUBJECTIVE AND OBJECTIVE BOX
HPI: 44F with PMH as listed admitted 4/8 with     PERTINENT PMH REVIEWED: Yes     PAST MEDICAL & SURGICAL HISTORY:  Depression  Hypothyroidism    SOCIAL HISTORY:  unable to obtain patient is intubated                                    Admitted from:  home     Surrogate Tanika Lindsay [phone]   FAMILY HISTORY: unable to obtain patient is intubated     Baseline ADLs (prior to admission):  Independent     Allergies    No Known Allergies    Present Symptoms:              Dyspnea: vented   Nausea/Vomiting: unable   Anxiety:  unable   Depression: unable  Fatigue: unable   Loss of appetite: unable     Pain: none             Character-            Duration-            Effect-            Factors-            Frequency-            Location-            Severity-    Review of Systems: Reviewed                  Unable to obtain due to poor mentation     MEDICATIONS  (STANDING):  argatroban Infusion 2 MICROgram(s)/kG/Min (10.2 mL/Hr) IV Continuous <Continuous>  artificial  tears Solution 1 Drop(s) Both EYES two times a day  chlorhexidine 0.12% Liquid 15 milliLiter(s) Oral Mucosa every 12 hours  chlorhexidine 2% Cloths 1 Application(s) Topical daily  dextrose 5%. 1000 milliLiter(s) (50 mL/Hr) IV Continuous <Continuous>  dextrose 50% Injectable 12.5 Gram(s) IV Push once  dextrose 50% Injectable 25 Gram(s) IV Push once  dextrose 50% Injectable 25 Gram(s) IV Push once  HYDROmorphone Infusion. 1 mG/Hr (1 mL/Hr) IV Continuous <Continuous>  insulin lispro (HumaLOG) corrective regimen sliding scale   SubCutaneous every 6 hours  levothyroxine Injectable 100 MICROGram(s) IV Push at bedtime  midazolam Infusion 0.02 mG/kG/Hr (1.7 mL/Hr) IV Continuous <Continuous>  midodrine 15 milliGRAM(s) Oral every 8 hours  pantoprazole  Injectable 40 milliGRAM(s) IV Push daily  PARoxetine 10 milliGRAM(s) Oral daily    MEDICATIONS  (PRN):  acetaminophen   Tablet .. 650 milliGRAM(s) Oral every 4 hours PRN Temp greater or equal to 38.5C (101.3F)  dextrose 40% Gel 15 Gram(s) Oral once PRN Blood Glucose LESS THAN 70 milliGRAM(s)/deciLiter  glucagon  Injectable 1 milliGRAM(s) IntraMuscular once PRN Glucose <70 milliGRAM(s)/deciLiter    PHYSICAL EXAM:    Vital Signs Last 24 Hrs  T(C): 38.8 (27 Apr 2020 08:57), Max: 39.9 (27 Apr 2020 00:22)  T(F): 101.9 (27 Apr 2020 08:57), Max: 103.8 (27 Apr 2020 00:22)  HR: 116 (27 Apr 2020 12:00) (113 - 121)  BP: --  BP(mean): --  RR: 28 (27 Apr 2020 12:00) (28 - 34)  SpO2: 94% (27 Apr 2020 12:00) (94% - 96%)    General: intubated     Karnofsky:  20 %    HEENT: ET tube    Lungs: comfortable     CV: normal      GI: OG tube     : pederson    MSK: weakness      Skin: no rash    LABS:                      8.6    11.88 )-----------( 176      ( 27 Apr 2020 05:13 )             28.7     04-27    145  |  100  |  20.0  ----------------------------<  101<H>  4.2   |  34.0<H>  |  0.53    Ca    8.2<L>      27 Apr 2020 05:13  Phos  3.4     04-27  Mg     2.2     04-27    TPro  5.7<L>  /  Alb  3.0<L>  /  TBili  0.6  /  DBili  x   /  AST  32<H>  /  ALT  77<H>  /  AlkPhos  112  04-27    I&O's Summary    26 Apr 2020 07:01  -  27 Apr 2020 07:00  --------------------------------------------------------  IN: 669.6 mL / OUT: 725 mL / NET: -55.4 mL    27 Apr 2020 07:01  -  27 Apr 2020 12:21  --------------------------------------------------------  IN: 168.8 mL / OUT: 275 mL / NET: -106.2 mL        RADIOLOGY & ADDITIONAL STUDIES:    ADVANCE DIRECTIVES:   DNR YES NO  Completed on:                     MOLST  YES NO   Completed on:  Living Will  YES NO   Completed on: HPI: 44F with PMH as listed admitted 4/8 with shortness of breath, fever, bilateral airspace opacities, initially admitted to medical floor, unfortunately decompensated days later on April 14th requiring intubation. Now been intubated for 14 days, and unfortunately not making progress in the right direction. Course complicated by LE DVT, started on heparin, HIT +, switched to argatroban.     PERTINENT PMH REVIEWED: Yes     PAST MEDICAL & SURGICAL HISTORY:  Depression  Hypothyroidism    SOCIAL HISTORY:  unable to obtain patient is intubated                                    Admitted from:  home     Surrogate Tnaika Lindsay [phone]     FAMILY HISTORY: unable to obtain patient is intubated     Baseline ADLs (prior to admission):  Independent     Allergies    No Known Allergies    Present Symptoms:              Dyspnea: vented   Nausea/Vomiting: unable   Anxiety:  unable   Depression: unable  Fatigue: unable   Loss of appetite: unable     Pain: none             Character-            Duration-            Effect-            Factors-            Frequency-            Location-            Severity-    Review of Systems: Reviewed                  Unable to obtain due to poor mentation     MEDICATIONS  (STANDING):  argatroban Infusion 2 MICROgram(s)/kG/Min (10.2 mL/Hr) IV Continuous <Continuous>  artificial  tears Solution 1 Drop(s) Both EYES two times a day  chlorhexidine 0.12% Liquid 15 milliLiter(s) Oral Mucosa every 12 hours  chlorhexidine 2% Cloths 1 Application(s) Topical daily  dextrose 5%. 1000 milliLiter(s) (50 mL/Hr) IV Continuous <Continuous>  dextrose 50% Injectable 12.5 Gram(s) IV Push once  dextrose 50% Injectable 25 Gram(s) IV Push once  dextrose 50% Injectable 25 Gram(s) IV Push once  HYDROmorphone Infusion. 1 mG/Hr (1 mL/Hr) IV Continuous <Continuous>  insulin lispro (HumaLOG) corrective regimen sliding scale   SubCutaneous every 6 hours  levothyroxine Injectable 100 MICROGram(s) IV Push at bedtime  midazolam Infusion 0.02 mG/kG/Hr (1.7 mL/Hr) IV Continuous <Continuous>  midodrine 15 milliGRAM(s) Oral every 8 hours  pantoprazole  Injectable 40 milliGRAM(s) IV Push daily  PARoxetine 10 milliGRAM(s) Oral daily    MEDICATIONS  (PRN):  acetaminophen   Tablet .. 650 milliGRAM(s) Oral every 4 hours PRN Temp greater or equal to 38.5C (101.3F)  dextrose 40% Gel 15 Gram(s) Oral once PRN Blood Glucose LESS THAN 70 milliGRAM(s)/deciLiter  glucagon  Injectable 1 milliGRAM(s) IntraMuscular once PRN Glucose <70 milliGRAM(s)/deciLiter    PHYSICAL EXAM:    Vital Signs Last 24 Hrs  T(C): 38.8 (27 Apr 2020 08:57), Max: 39.9 (27 Apr 2020 00:22)  T(F): 101.9 (27 Apr 2020 08:57), Max: 103.8 (27 Apr 2020 00:22)  HR: 116 (27 Apr 2020 12:00) (113 - 121)  BP: --  BP(mean): --  RR: 28 (27 Apr 2020 12:00) (28 - 34)  SpO2: 94% (27 Apr 2020 12:00) (94% - 96%)    General: intubated     Karnofsky:  20 %    HEENT: ET tube    Lungs: comfortable     CV: normal      GI: OG tube     : pederson    MSK: weakness      Skin: no rash    LABS:                      8.6    11.88 )-----------( 176      ( 27 Apr 2020 05:13 )             28.7     04-27    145  |  100  |  20.0  ----------------------------<  101<H>  4.2   |  34.0<H>  |  0.53    Ca    8.2<L>      27 Apr 2020 05:13  Phos  3.4     04-27  Mg     2.2     04-27    TPro  5.7<L>  /  Alb  3.0<L>  /  TBili  0.6  /  DBili  x   /  AST  32<H>  /  ALT  77<H>  /  AlkPhos  112  04-27    I&O's Summary    26 Apr 2020 07:01  -  27 Apr 2020 07:00  --------------------------------------------------------  IN: 669.6 mL / OUT: 725 mL / NET: -55.4 mL    27 Apr 2020 07:01  -  27 Apr 2020 12:21  --------------------------------------------------------  IN: 168.8 mL / OUT: 275 mL / NET: -106.2 mL        RADIOLOGY & ADDITIONAL STUDIES:    ADVANCE DIRECTIVES: Full code HPI: 44F with PMH as listed admitted 4/8 with shortness of breath, fever, bilateral airspace opacities, initially admitted to medical floor, unfortunately decompensated days later on April 14th requiring intubation. Now been intubated for 14 days, and unfortunately not making progress in the right direction. Course complicated by LE DVT, started on heparin, HIT +, switched to argatroban.     PERTINENT PMH REVIEWED: Yes     PAST MEDICAL & SURGICAL HISTORY:  Depression  Hypothyroidism    SOCIAL HISTORY:  unable to obtain patient is intubated                                    Admitted from:  home     Surrogate Tanika Lindsay [phone]     FAMILY HISTORY: unable to obtain patient is intubated     Baseline ADLs (prior to admission):  Independent     Allergies    No Known Allergies    Present Symptoms:              Dyspnea: vented   Nausea/Vomiting: unable   Anxiety:  unable   Depression: unable  Fatigue: unable   Loss of appetite: unable     Pain: none             Character-            Duration-            Effect-            Factors-            Frequency-            Location-            Severity-    Review of Systems: Reviewed                  Unable to obtain due to poor mentation     MEDICATIONS  (STANDING):  argatroban Infusion 2 MICROgram(s)/kG/Min (10.2 mL/Hr) IV Continuous <Continuous>  artificial  tears Solution 1 Drop(s) Both EYES two times a day  chlorhexidine 0.12% Liquid 15 milliLiter(s) Oral Mucosa every 12 hours  chlorhexidine 2% Cloths 1 Application(s) Topical daily  dextrose 5%. 1000 milliLiter(s) (50 mL/Hr) IV Continuous <Continuous>  dextrose 50% Injectable 12.5 Gram(s) IV Push once  dextrose 50% Injectable 25 Gram(s) IV Push once  dextrose 50% Injectable 25 Gram(s) IV Push once  HYDROmorphone Infusion. 1 mG/Hr (1 mL/Hr) IV Continuous <Continuous>  insulin lispro (HumaLOG) corrective regimen sliding scale   SubCutaneous every 6 hours  levothyroxine Injectable 100 MICROGram(s) IV Push at bedtime  midazolam Infusion 0.02 mG/kG/Hr (1.7 mL/Hr) IV Continuous <Continuous>  midodrine 15 milliGRAM(s) Oral every 8 hours  pantoprazole  Injectable 40 milliGRAM(s) IV Push daily  PARoxetine 10 milliGRAM(s) Oral daily    MEDICATIONS  (PRN):  acetaminophen   Tablet .. 650 milliGRAM(s) Oral every 4 hours PRN Temp greater or equal to 38.5C (101.3F)  dextrose 40% Gel 15 Gram(s) Oral once PRN Blood Glucose LESS THAN 70 milliGRAM(s)/deciLiter  glucagon  Injectable 1 milliGRAM(s) IntraMuscular once PRN Glucose <70 milliGRAM(s)/deciLiter    PHYSICAL EXAM:    Vital Signs Last 24 Hrs  T(C): 38.8 (27 Apr 2020 08:57), Max: 39.9 (27 Apr 2020 00:22)  T(F): 101.9 (27 Apr 2020 08:57), Max: 103.8 (27 Apr 2020 00:22)  HR: 116 (27 Apr 2020 12:00) (113 - 121)  BP: --  BP(mean): --  RR: 28 (27 Apr 2020 12:00) (28 - 34)  SpO2: 94% (27 Apr 2020 12:00) (94% - 96%)    General: intubated     Karnofsky:  20 %    HEENT: ET tube    Lungs: comfortable     CV: normal      GI: OG tube     : pederson    MSK: weakness      Skin: no rash    LABS:                      8.6    11.88 )-----------( 176      ( 27 Apr 2020 05:13 )             28.7     04-27    145  |  100  |  20.0  ----------------------------<  101<H>  4.2   |  34.0<H>  |  0.53    Ca    8.2<L>      27 Apr 2020 05:13  Phos  3.4     04-27  Mg     2.2     04-27    TPro  5.7<L>  /  Alb  3.0<L>  /  TBili  0.6  /  DBili  x   /  AST  32<H>  /  ALT  77<H>  /  AlkPhos  112  04-27    I&O's Summary    26 Apr 2020 07:01  -  27 Apr 2020 07:00  --------------------------------------------------------  IN: 669.6 mL / OUT: 725 mL / NET: -55.4 mL    27 Apr 2020 07:01  -  27 Apr 2020 12:21  --------------------------------------------------------  IN: 168.8 mL / OUT: 275 mL / NET: -106.2 mL    RADIOLOGY & ADDITIONAL STUDIES:     < from: Xray Chest 1 View- PORTABLE-Urgent (04.24.20 @ 16:04) >    IMPRESSION: Significant interval increase in bilateral lung parenchymal airspace opacities.    < end of copied text >    < from: US Gallbladder (04.23.20 @ 18:09) >  Impression: Gallbladder sludge. No biliary dilatation    < end of copied text >    < from: Xray Chest 1 View- PORTABLE-Urgent (04.21.20 @ 20:24) >  Impression: Mild improvement in left-sided infiltrate.    < end of copied text >    < from: CT Angio Chest w/ IV Cont (04.16.20 @ 16:36) >  FINDINGS:    LUNGS AND AIRWAYS: ET tube with its distal tip just above the jose elias. This may be withdrawn.  There is extensive consolidation of the lower lobes and patchy groundglass attenuation with intralobular septal thickening of the upper lobes and right middle lobe.    PLEURA: No pleural effusion.    MEDIASTINUM AND KATARZYNA: Multiplesubcentimeter mediastinal lymph nodes.    VESSELS: There is no pulmonary embolism. Note however that assessment of the distal segmental and subsegmental branches is limited by motion and adjacent parenchymal disease. The thoracic aorta and main pulmonary artery are normal in caliber.    HEART: Heart size is normal. No pericardial effusion.    CHEST WALL AND LOWER NECK: The thyroid gland is small in size. There is no supraclavicular or axillary lymphadenopathy.    VISUALIZED UPPER ABDOMEN: The imaged portion of the liver and spleen are within normal limits. The adrenal glands are not imaged. There is an NG tube extending into the stomach its distal tip is not imaged.    BONES: Within normal limits.    IMPRESSION:     Pattern of GGO suggests infection including atypical pneumonia/viral infection from atypical agents including COVID-19 (C19V-1).    No pulmonary embolism. Limited for the more distal branches as detailed above.    ET tube just above the level of the jose elias and may be likely withdrawn.      < end of copied text >    < from: US Duplex Venous Lower Ext Complete, Bilateral (04.14.20 @ 09:37) >  IMPRESSION:     No evidence of deep venous thrombosis in either lower extremity.    < end of copied text >    < from: US Duplex Venous Lower Ext Complete, Bilateral (04.21.20 @ 16:54) >  New DVT involving the left calf veins.    < end of copied text >    ADVANCE DIRECTIVES: Full code HPI: 44F with PMH as listed admitted 4/8 with shortness of breath, fever, bilateral airspace opacities, initially admitted to medical floor, unfortunately decompensated days later on April 14th requiring intubation. Now been intubated for 14 days, and unfortunately not making progress in the right direction. Course complicated by LE DVT, started on heparin, HIT +, switched to argatroban.     s/p actemra on 4/14. completed plaquenil.     PERTINENT PMH REVIEWED: Yes     PAST MEDICAL & SURGICAL HISTORY:  Depression  Hypothyroidism    SOCIAL HISTORY:  unable to obtain patient is intubated                                    Admitted from:  home     Surrogate Tanika Lindsay [phone]     FAMILY HISTORY: unable to obtain patient is intubated     Baseline ADLs (prior to admission):  Independent     Allergies    No Known Allergies    Present Symptoms:              Dyspnea: vented   Nausea/Vomiting: unable   Anxiety:  unable   Depression: unable  Fatigue: unable   Loss of appetite: unable     Pain: none             Character-            Duration-            Effect-            Factors-            Frequency-            Location-            Severity-    Review of Systems: Reviewed                  Unable to obtain due to poor mentation     MEDICATIONS  (STANDING):  argatroban Infusion 2 MICROgram(s)/kG/Min (10.2 mL/Hr) IV Continuous <Continuous>  artificial  tears Solution 1 Drop(s) Both EYES two times a day  chlorhexidine 0.12% Liquid 15 milliLiter(s) Oral Mucosa every 12 hours  chlorhexidine 2% Cloths 1 Application(s) Topical daily  dextrose 5%. 1000 milliLiter(s) (50 mL/Hr) IV Continuous <Continuous>  dextrose 50% Injectable 12.5 Gram(s) IV Push once  dextrose 50% Injectable 25 Gram(s) IV Push once  dextrose 50% Injectable 25 Gram(s) IV Push once  HYDROmorphone Infusion. 1 mG/Hr (1 mL/Hr) IV Continuous <Continuous>  insulin lispro (HumaLOG) corrective regimen sliding scale   SubCutaneous every 6 hours  levothyroxine Injectable 100 MICROGram(s) IV Push at bedtime  midazolam Infusion 0.02 mG/kG/Hr (1.7 mL/Hr) IV Continuous <Continuous>  midodrine 15 milliGRAM(s) Oral every 8 hours  pantoprazole  Injectable 40 milliGRAM(s) IV Push daily  PARoxetine 10 milliGRAM(s) Oral daily    MEDICATIONS  (PRN):  acetaminophen   Tablet .. 650 milliGRAM(s) Oral every 4 hours PRN Temp greater or equal to 38.5C (101.3F)  dextrose 40% Gel 15 Gram(s) Oral once PRN Blood Glucose LESS THAN 70 milliGRAM(s)/deciLiter  glucagon  Injectable 1 milliGRAM(s) IntraMuscular once PRN Glucose <70 milliGRAM(s)/deciLiter    PHYSICAL EXAM:    Vital Signs Last 24 Hrs  T(C): 38.8 (27 Apr 2020 08:57), Max: 39.9 (27 Apr 2020 00:22)  T(F): 101.9 (27 Apr 2020 08:57), Max: 103.8 (27 Apr 2020 00:22)  HR: 116 (27 Apr 2020 12:00) (113 - 121)  BP: --  BP(mean): --  RR: 28 (27 Apr 2020 12:00) (28 - 34)  SpO2: 94% (27 Apr 2020 12:00) (94% - 96%)    General: intubated     Karnofsky:  20 %    HEENT: ET tube    Lungs: comfortable     CV: normal      GI: OG tube     : pederson    MSK: weakness      Skin: no rash    LABS:                      8.6    11.88 )-----------( 176      ( 27 Apr 2020 05:13 )             28.7     04-27    145  |  100  |  20.0  ----------------------------<  101<H>  4.2   |  34.0<H>  |  0.53    Ca    8.2<L>      27 Apr 2020 05:13  Phos  3.4     04-27  Mg     2.2     04-27    TPro  5.7<L>  /  Alb  3.0<L>  /  TBili  0.6  /  DBili  x   /  AST  32<H>  /  ALT  77<H>  /  AlkPhos  112  04-27    I&O's Summary    26 Apr 2020 07:01  -  27 Apr 2020 07:00  --------------------------------------------------------  IN: 669.6 mL / OUT: 725 mL / NET: -55.4 mL    27 Apr 2020 07:01  -  27 Apr 2020 12:21  --------------------------------------------------------  IN: 168.8 mL / OUT: 275 mL / NET: -106.2 mL    RADIOLOGY & ADDITIONAL STUDIES:     < from: Xray Chest 1 View- PORTABLE-Urgent (04.24.20 @ 16:04) >    IMPRESSION: Significant interval increase in bilateral lung parenchymal airspace opacities.    < end of copied text >    < from: US Gallbladder (04.23.20 @ 18:09) >  Impression: Gallbladder sludge. No biliary dilatation    < end of copied text >    < from: Xray Chest 1 View- PORTABLE-Urgent (04.21.20 @ 20:24) >  Impression: Mild improvement in left-sided infiltrate.    < end of copied text >    < from: CT Angio Chest w/ IV Cont (04.16.20 @ 16:36) >  FINDINGS:    LUNGS AND AIRWAYS: ET tube with its distal tip just above the jose elias. This may be withdrawn.  There is extensive consolidation of the lower lobes and patchy groundglass attenuation with intralobular septal thickening of the upper lobes and right middle lobe.    PLEURA: No pleural effusion.    MEDIASTINUM AND KATARZYNA: Multiplesubcentimeter mediastinal lymph nodes.    VESSELS: There is no pulmonary embolism. Note however that assessment of the distal segmental and subsegmental branches is limited by motion and adjacent parenchymal disease. The thoracic aorta and main pulmonary artery are normal in caliber.    HEART: Heart size is normal. No pericardial effusion.    CHEST WALL AND LOWER NECK: The thyroid gland is small in size. There is no supraclavicular or axillary lymphadenopathy.    VISUALIZED UPPER ABDOMEN: The imaged portion of the liver and spleen are within normal limits. The adrenal glands are not imaged. There is an NG tube extending into the stomach its distal tip is not imaged.    BONES: Within normal limits.    IMPRESSION:     Pattern of GGO suggests infection including atypical pneumonia/viral infection from atypical agents including COVID-19 (C19V-1).    No pulmonary embolism. Limited for the more distal branches as detailed above.    ET tube just above the level of the jose elias and may be likely withdrawn.      < end of copied text >    < from: US Duplex Venous Lower Ext Complete, Bilateral (04.14.20 @ 09:37) >  IMPRESSION:     No evidence of deep venous thrombosis in either lower extremity.    < end of copied text >    < from: US Duplex Venous Lower Ext Complete, Bilateral (04.21.20 @ 16:54) >  New DVT involving the left calf veins.    < end of copied text >    ADVANCE DIRECTIVES: Full code

## 2020-04-27 NOTE — CONSULT NOTE ADULT - ASSESSMENT
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     #1 Hypoxic respiratory failure - due to COVID - has not made any progress still on high settings.   #2 LE DVT - on argatroban drip (HIT +)  #3 COVID 19 - supportive measures.  #4 Palliative care Encounter - will reach out to family today to offer support. 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     #1 Hypoxic respiratory failure - due to COVID - has not made any progress still on high settings.   #2 LE DVT - on argatroban drip (HIT +)  #3 COVID 19 - supportive measures.  #4 Palliative care Encounter - will reach out to family today to offer support.

## 2020-04-27 NOTE — PROGRESS NOTE ADULT - SUBJECTIVE AND OBJECTIVE BOX
Patient is a 44y old  Female who presents with a chief complaint of SOB (26 Apr 2020 11:04)      BRIEF HOSPITAL COURSE: 44y female pmhx obesity, depression, hypothyroidism, found to be COVID-19 positive, admitted with respiratory distress, complicated by  development of  acute hypoxemic respiratory failure, ARDS, secondary to severe sepsis viral COVID 19 pneumonia. Hospital course complicated by development of HIT, Started on Argatroban    Events last 24 hours:  Remains on 100% and PEEP18. Unable to titrate down , paO2 not improving on most recent ABG. Well sedated on versed and Dilaudid.      PAST MEDICAL & SURGICAL HISTORY:  Depression  Hypothyroidism        Hosp day #19d    Vent day # 13   Mode: AC/ CMV (Assist Control/ Continuous Mandatory Ventilation)  RR (machine): 34  TV (machine): 350  FiO2: 100  PEEP: 16  MAP: 24  PIP: 36        Vital signs / Reviewed and Physical Exam Performed where pertinent and urgently required    Lab / Radiology  studies / ABG / Meds -  reviewed and interpreted into the assessment and treatment plan.      Assessment/Plan/Therapeutic interventions    Impression:  1. Acute hypoxic respiratory failure  2. Severe ARDS  3. COVID-19  4. HIT   5. Hypothyroid  6. Shock, resolved     Neuro - Sedated w/ Dilaudid and versed. Intermittent NMB boluses.     CV -  Pressor support as needed to maintain MAP 65. Monitor end points of perfusion. Midodrine 10mg q8hr.            Avoiding fluid challenges           Pulm -  ARDS-NET 4-6cc/kg IBW TV as able to maintain plateau pressures <30               Prone ventilation consideration as feasible  Pa02/Fi02 < 150 on Fi02 >60% and PEEP at least 5                 Vent bundle Reviewed . High FiO2 and PEEP requirements of 100% and PEEP18. PF ratio 64.   - s/p steroids/ hydroxychlorquine  - s/p Actemra 4/14      GI -  PPI  daily. Tolerating tube feeds     Renal - Even to negative fluid balance as tolerated by hemodynamics and renal fx.  Feeds to be provided in lieu of IVF.     Heme -  + HIT, on Argatroban. SCDs. Trend plts     ID - ABX discontinuation based on discussion with ID in conjunction with clinical features, culture data, and judicious procalcitonin monitoring.      Endo -  Aggressive glycemic control to limit FS glucose to < 180mg/dl.  - Hypothyroid, elevated TSH. Increased synthroid dose on 4/24.         COVID 19 specific considerations and therapeutic  options based on the available and rapidly changing literature    Goals of care considerations:  Ongoing assessment for patient specific treatment options based on progression or decline.  I have involved the family with updates and requests in guidance for medical decision making.          45 min  Minutes of critical care tiem spent in the management of this critically ill COVID-19 patient/PUI patient with continuous assessments and interventions based on the interpretation of multiple databases.

## 2020-04-27 NOTE — PROGRESS NOTE ADULT - SUBJECTIVE AND OBJECTIVE BOX
[facility]  INFECTIOUS DISEASES AND INTERNAL MEDICINE at [place]  =======================================================  Kamran Leija MD  Diplomates American Board of Internal Medicine and Infectious Diseases  =======================================================    [id number]  BAUDILIO SHAH     Follow up: respiratory failure, covid19    On FIO2 100% with sat around 95.  Febrile with Tmax 103.8    PAST MEDICAL & SURGICAL HISTORY:  Depression  Hypothyroidism    Social Hx: unknown    FAMILY HISTORY: unknown     Allergies  No Known Allergies    Antibiotics:  piperacillin/tazobactam IVPB.. 3.375 Gram(s) IV Intermittent every 8 hours    REVIEW OF SYSTEMS:  Unable to answer     Physical Exam:  Vital Signs Last 24 Hrs  T(C): 38.9 (27 Apr 2020 12:44), Max: 39.9 (27 Apr 2020 00:22)  T(F): 102.1 (27 Apr 2020 12:44), Max: 103.8 (27 Apr 2020 00:22)  HR: 116 (27 Apr 2020 12:00) (113 - 121)  BP: --  BP(mean): --  RR: 28 (27 Apr 2020 12:00) (28 - 34)  SpO2: 94% (27 Apr 2020 12:00) (94% - 96%)  GEN: NAD  HEENT: normocephalic and atraumatic. EOMI. PERRL.    NECK: Supple.  No lymphadenopathy   LUNGS: Clear to auscultation.  HEART: Regular rate and rhythm   ABDOMEN: Soft, nontender, and nondistended.  Positive bowel sounds.    EXTREMITIES: Without edema.  NEUROLOGIC: grossly intact.  PSYCHIATRIC: Appropriate affect .  SKIN: No rash    Labs:  04-27    145  |  100  |  20.0  ----------------------------<  101<H>  4.2   |  34.0<H>  |  0.53    Ca    8.2<L>      27 Apr 2020 05:13  Phos  3.4     04-27  Mg     2.2     04-27    TPro  5.7<L>  /  Alb  3.0<L>  /  TBili  0.6  /  DBili  x   /  AST  32<H>  /  ALT  77<H>  /  AlkPhos  112  04-27                        8.6    11.88 )-----------( 176      ( 27 Apr 2020 05:13 )             28.7     LIVER FUNCTIONS - ( 27 Apr 2020 05:13 )  Alb: 3.0 g/dL / Pro: 5.7 g/dL / ALK PHOS: 112 U/L / ALT: 77 U/L / AST: 32 U/L / GGT: x           ABG - ( 26 Apr 2020 23:46 )  pH, Arterial: 7.38  pH, Blood: x     /  pCO2: 63    /  pO2: 72    / HCO3: 36    / Base Excess: 11.7  /  SaO2: 96        RECENT CULTURES:  04-23 @ 17:20 .Blood Blood-Arterial     No growth at 48 hours    04-22 @ 03:40 .Sputum Sputum     Normal Respiratory Fina present    Moderate polymorphonuclear leukocytes per low power field  No Squamous epithelial cells per low power field  No organisms seen per oil power field    04-21 @ 15:44 .Blood Blood-Peripheral     No growth at 5 days.    All imaging and other data have been reviewed.  < from: Xray Chest 1 View- PORTABLE-Urgent (04.21.20 @ 20:24) >   EXAM:  XR CHEST PORTABLE URGENT 1V                        PROCEDURE DATE:  04/21/2020    INTERPRETATION:  History: COVID pneumonia and hypoxia  Portable radiograph of the chest is performed. comparison is made to 4/14/2020  ET tube NG tube and right IJ line are again noted. Patchy mid to lower lung infiltrates are mildly improved on the left. There is no pleural effusion. A right midline is again seen.  Impression: Mild improvement in left-sided infiltrate.  DISCRETE X-RAY DATA:  Percent of LEFT lung opacification: 1-33%  Percent of RIGHT lung opacification: 1-33%  Change in lung opacification from most recent x-ray (<=3 days): No Prior  Change from prior dated 3 or more days (same admission): Decrease    Assessment and Plan:   43 y/o woman with morbid obesity, Hypothyroidism and Depression was admitted on 4/8 with SOB, cough and congestion. She has a positive COVID19 test.   She got worse with respiratory failure and intubated on 4/14.  She has extensive bilateral airspace opacities may represent multifocal pneumonia.  ID has been called for possible bacterial superinfection due to fever, leukocytosis and increased PCT.   I doubt that she has bacterial infection, CRP is going down, passed the interleukin storm.    COVID 19 + infection  Viral pneumonia  Acute hypoxic respiratory failure  Transaminitis     - Blood culture 4/7 and 4/23 NGTD  - Sputum negative 4/22  - Airborne and contact isolation   - COVID 19 PCR positive   - CXR with multifocal opacities mild clearing in left side.   - Procalcitonin 0.1-->0.27-->0.23  - CRP=8.19-->1.71-->4.89, Ferritin and LDH high   - Completed Hydroxychloroquine   - Check CBC with diff, CMP with LFT's, Inflammatory markers (D-dimer, CRP, Ferritin, LDH,  procalcitonin)  q48h.    - S/P steroid  - Trend WBC 27K-->19k-->11k high possible due to steroid   - Since cultures are negative and procal is low, will hold on antibiotics   - S/P one dose of Actemra 400mg on 4/14    Will follow.

## 2020-04-27 NOTE — CHART NOTE - NSCHARTNOTEFT_GEN_A_CORE
Pt continues with high fevers, hypothermia blanket added. Will monitor  Palliative on board, will reach out to family for [facility] conversation

## 2020-04-27 NOTE — PROGRESS NOTE ADULT - ASSESSMENT
43 y/o FEMALE admitted with acute hypoxic respiratory and ARDS secondary to COVID-19. Patient was intuabted on 4/14       NEURO: Sedated on  Versed, Dilaudid.  CVS: No suspected cardiogenic source of pulmonary infiltrates. Hemodynamically stable off of pressors. Monitor for COVID induced cardiomyopathy  PULM: on lung protective ventilatory strategy at 4-6 cc/kg predicted IBW. titrate TV/RR for pH > 7.30.  chlorhexidine wash daily.  GI: Continue tube feeds. Protonix 40 mg daily for GI prophylaxis.  RENAL: Monitor electrolytes, keep K > 4, Mg > 2. Maintain pederson placed for critical I&O.  ID: BX discontinuation based on discussion with ID in conjunction with clinical features, culture data, and judicious procalcitonin monitoring. Cont to trend inflammatory markers.  ENDO:  continue ARGENIS Q6Hrs, reassess daily.  HEME: Argatroban, Cont mechanical DVT prophylaxis  DISPO: DNR

## 2020-04-27 NOTE — PROGRESS NOTE ADULT - SUBJECTIVE AND OBJECTIVE BOX
Significant recent/past 24 hr events:  No acute overnight events. Pt remains intubated and sedated.  Palliative care consult: DNR    Subjective:    Patient is a 44y old  Female who presents with a chief complaint of SOB (27 Apr 2020 14:45)    HPI:  A 43 y/o Female with PMHx significant for Hypothyroidism, Depression, presents today c/o SOB, nasal congestion, cough with clear sputum production, muscle aches and chills. Pt reports her symptoms started 3 days ago, she tried cough medicine but symptoms failed to improved. Also endorses fatigue, SOB and chest tightness that worsened today.   Patient denies any sick contact or recent travel history.   At the ED noted to desat to low 84' on RA, improved to 95% on 5-6L NC, Temp: 102.2; CXR:  Extensive bilateral airspace opacities may represent multifocal pneumonia (08 Apr 2020 02:47)    PAST MEDICAL & SURGICAL HISTORY:  Depression  Hypothyroidism    FAMILY HISTORY:    Vitals   ICU Vital Signs Last 24 Hrs  T(C): 38.7 (27 Apr 2020 15:34), Max: 39.9 (27 Apr 2020 00:22)  T(F): 101.6 (27 Apr 2020 15:34), Max: 103.8 (27 Apr 2020 00:22)  HR: 109 (27 Apr 2020 16:00) (109 - 121)  BP: --  BP(mean): --  ABP: 105/66 (27 Apr 2020 16:00) (105/66 - 125/83)  ABP(mean): 81 (27 Apr 2020 16:00) (74 - 113)  RR: 34 (27 Apr 2020 16:00) (28 - 34)  SpO2: 95% (27 Apr 2020 16:00) (94% - 96%)      VENT SETTINGS   Mode: AC/ CMV (Assist Control/ Continuous Mandatory Ventilation)  RR (machine): 34  TV (machine): 350  FiO2: 100  PEEP: 16  MAP: 24  PIP: 38    ABG - ( 26 Apr 2020 23:46 )  pH, Arterial: 7.38  pH, Blood: x     /  pCO2: 63    /  pO2: 72    / HCO3: 36    / Base Excess: 11.7  /  SaO2: 96        I&O's Detail    26 Apr 2020 07:01  -  27 Apr 2020 07:00  --------------------------------------------------------  IN:    argatroban Infusion: 142.8 mL    Enteral Tube Flush: 60 mL    Glucerna 1.5: 270 mL    HYDROmorphone Infusion.: 54 mL    midazolam Infusion: 142.8 mL  Total IN: 669.6 mL    OUT:    Indwelling Catheter - Urethral: 725 mL  Total OUT: 725 mL    Total NET: -55.4 mL      27 Apr 2020 07:01  -  27 Apr 2020 16:24  --------------------------------------------------------  IN:    argatroban Infusion: 30.6 mL    Enteral Tube Flush: 60 mL    Glucerna 1.5: 90 mL    HYDROmorphone Infusion.: 12 mL    midazolam Infusion: 30.6 mL  Total IN: 223.2 mL    OUT:    Indwelling Catheter - Urethral: 425 mL  Total OUT: 425 mL    Total NET: -201.8 mL      LABS                        8.6    11.88 )-----------( 176      ( 27 Apr 2020 05:13 )             28.7     04-27    145  |  100  |  20.0  ----------------------------<  101<H>  4.2   |  34.0<H>  |  0.53    Ca    8.2<L>      27 Apr 2020 05:13  Phos  3.4     04-27  Mg     2.2     04-27    TPro  5.7<L>  /  Alb  3.0<L>  /  TBili  0.6  /  DBili  x   /  AST  32<H>  /  ALT  77<H>  /  AlkPhos  112  04-27    LIVER FUNCTIONS - ( 27 Apr 2020 05:13 )  Alb: 3.0 g/dL / Pro: 5.7 g/dL / ALK PHOS: 112 U/L / ALT: 77 U/L / AST: 32 U/L / GGT: x             POCT Blood Glucose.: 122 mg/dL (04-27-20 @ 11:24)  POCT Blood Glucose.: 123 mg/dL (04-27-20 @ 05:43)  POCT Blood Glucose.: 123 mg/dL (04-26-20 @ 23:08)  POCT Blood Glucose.: 119 mg/dL (04-26-20 @ 16:39)      MEDICATIONS  (STANDING):  argatroban Infusion 2 MICROgram(s)/kG/Min (10.2 mL/Hr) IV Continuous <Continuous>  artificial  tears Solution 1 Drop(s) Both EYES two times a day  chlorhexidine 0.12% Liquid 15 milliLiter(s) Oral Mucosa every 12 hours  chlorhexidine 2% Cloths 1 Application(s) Topical daily  dextrose 5%. 1000 milliLiter(s) (50 mL/Hr) IV Continuous <Continuous>  dextrose 50% Injectable 12.5 Gram(s) IV Push once  dextrose 50% Injectable 25 Gram(s) IV Push once  dextrose 50% Injectable 25 Gram(s) IV Push once  HYDROmorphone Infusion. 1 mG/Hr (1 mL/Hr) IV Continuous <Continuous>  insulin lispro (HumaLOG) corrective regimen sliding scale   SubCutaneous every 6 hours  levothyroxine Injectable 100 MICROGram(s) IV Push at bedtime  midazolam Infusion 0.02 mG/kG/Hr (1.7 mL/Hr) IV Continuous <Continuous>  midodrine 15 milliGRAM(s) Oral every 8 hours  pantoprazole  Injectable 40 milliGRAM(s) IV Push daily  PARoxetine 10 milliGRAM(s) Oral daily    MEDICATIONS  (PRN):  acetaminophen   Tablet .. 650 milliGRAM(s) Oral every 4 hours PRN Temp greater or equal to 38.5C (101.3F)  dextrose 40% Gel 15 Gram(s) Oral once PRN Blood Glucose LESS THAN 70 milliGRAM(s)/deciLiter  glucagon  Injectable 1 milliGRAM(s) IntraMuscular once PRN Glucose <70 milliGRAM(s)/deciLiter      Allergies:  No Known Allergies    Code Status: DNR      Critical care time spent: 35 minutes (reviewing chart including medication, labs and imaging results, discussions with interdisciplinary team, discussing goals of care/advanced directives, counseling patient and/or family, non-inclusive of procedures)    Case including assessment/plan of care discussed with  [facility] attending.

## 2020-04-27 NOTE — PROGRESS NOTE ADULT - SUBJECTIVE AND OBJECTIVE BOX
44 year old female with hypothyroidism who was admitted to the hospital due to covid-19 pneumonia and respiratory failure and was intubated on 4/14/2020.   She was put on lovenox 40mg bid from 4/8/20-4/14/20 but was found to covid-19+ so her DVT prophylaxis dose was increased to 80mg BID. Her platelet count was 261k on 4/15/2020. On 4/12/2020 it was 613k but reactive and increased only once and then went back to baseline.   However, he was found to have a LE DVT on 4/21/2020. She was then put on a heparin drip from 4/21/20-4/23/2020 when the screening HIT antibody screen was positive so her AC was changed to argatroban.  Her platelet count reached a peak of 273k on 4/21/2020 and to a aroldo so far of 153k on 4/24/2020.  plts today 176  no bleeding reported   pt intubated       MEDICATIONS  (STANDING):  artificial  tears Solution 1 Drop(s) Both EYES two times a day  chlorhexidine 0.12% Liquid 15 milliLiter(s) Oral Mucosa every 12 hours  chlorhexidine 2% Cloths 1 Application(s) Topical daily  dextrose 5%. 1000 milliLiter(s) (50 mL/Hr) IV Continuous <Continuous>  dextrose 50% Injectable 12.5 Gram(s) IV Push once  dextrose 50% Injectable 25 Gram(s) IV Push once  dextrose 50% Injectable 25 Gram(s) IV Push once  enoxaparin Injectable 80 milliGRAM(s) SubCutaneous every 12 hours  HYDROmorphone Infusion. 1 mG/Hr (1 mL/Hr) IV Continuous <Continuous>  insulin lispro (HumaLOG) corrective regimen sliding scale   SubCutaneous every 6 hours  levothyroxine Injectable 100 MICROGram(s) IV Push at bedtime  midazolam Infusion 0.02 mG/kG/Hr (1.7 mL/Hr) IV Continuous <Continuous>  midodrine 15 milliGRAM(s) Oral every 8 hours  pantoprazole  Injectable 40 milliGRAM(s) IV Push daily  PARoxetine 10 milliGRAM(s) Oral daily    MEDICATIONS  (PRN):  acetaminophen   Tablet .. 650 milliGRAM(s) Oral every 4 hours PRN Temp greater or equal to 38.5C (101.3F)  dextrose 40% Gel 15 Gram(s) Oral once PRN Blood Glucose LESS THAN 70 milliGRAM(s)/deciLiter  glucagon  Injectable 1 milliGRAM(s) IntraMuscular once PRN Glucose <70 milliGRAM(s)/deciLiter    Vital Signs Last 24 Hrs  T(C): 38.5 (27 Apr 2020 19:29), Max: 39 (27 Apr 2020 04:30)  T(F): 101.3 (27 Apr 2020 19:29), Max: 102.2 (27 Apr 2020 04:30)  HR: 111 (27 Apr 2020 21:09) (109 - 116)  BP: --  BP(mean): --  RR: 34 (27 Apr 2020 20:00) (28 - 34)  SpO2: 94% (27 Apr 2020 21:09) (94% - 95%)  Gen: nad,  ENT: on vent  CV: rrr  Abd: soft                                  8.6    11.88 )-----------( 176      ( 27 Apr 2020 05:13 )             28.7                   9.3    19.66 )-----------( 170      ( 25 Apr 2020 07:05 )             30.7                           8.9    20.75 )-----------( 153      ( 24 Apr 2020 03:47 )             28.8       04-24    145  |  100  |  23.0<H>  ----------------------------<  106<H>  4.4   |  33.0<H>  |  0.47<L>    Ca    8.1<L>      24 Apr 2020 03:48  Phos  2.3     04-24  Mg     2.1     04-24    TPro  5.4<L>  /  Alb  2.9<L>  /  TBili  1.0  /  DBili  x   /  AST  89<H>  /  ALT  226<H>  /  AlkPhos  146<H>  04-24

## 2020-04-27 NOTE — PROGRESS NOTE ADULT - ASSESSMENT
44 year old female admitted with covid-19+ respiratory failure on vent who developed a left calf vein DVT while on therapeutic dose lovenox.    1) DVT - screening HIT-ab positive. AMADA negative. Stop argatroban. On lovenox.    2) Covid-19+ - as per primary, ICU. On vent. Poor prognosis.    3) Leukocytosis - reactive, neutrophil predominant. Secondary to infection, covid. Monitor.    4. Normocytic anemia - hgb 8.6 today.   Monitor for bleeding. Transfuse if hgb <7.0.    Will follow.

## 2020-04-27 NOTE — CONSULT NOTE ADULT - ATTENDING COMMENTS
Thank you for the opportunity to assist with the care of this patient.   [facility] Consult [facility] [phone].

## 2020-04-28 NOTE — PROGRESS NOTE ADULT - SUBJECTIVE AND OBJECTIVE BOX
Patient is a 44y old  Female who presents with a chief complaint of SOB (2020 13:56)      BRIEF HOSPITAL COURSE:   45 yo female pmhx hypothyroidism and depression admitted  with COVID 19 respiratory failure with progression to ARDS requiring paralytic/proning therapy and distributive shock, completed plaquenil and received Actemra.     : MICU Consult worsening hypoxia, intubated, progression to ARDS.    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         Events last 24 hours: ***    PAST MEDICAL & SURGICAL HISTORY:  Depression  Hypothyroidism    Allergies  No Known Allergies      FAMILY HISTORY:  Unknown       Social History:   From home.       Review of Systems:  Unable to obtain 2/2 intubated/sedated      Medications:  meropenem  IVPB 1000 milliGRAM(s) IV Intermittent every 8 hours  vancomycin  IVPB 1000 milliGRAM(s) IV Intermittent every 12 hours  midodrine 15 milliGRAM(s) Oral every 8 hours  acetaminophen   Tablet .. 650 milliGRAM(s) Oral every 4 hours PRN  HYDROmorphone Infusion. 0.5 mG/Hr IV Continuous <Continuous>  midazolam Infusion 0.02 mG/kG/Hr IV Continuous <Continuous>  PARoxetine 10 milliGRAM(s) Oral daily  rocuronium Infusion 8 MICROgram(s)/kG/Min IV Continuous <Continuous>  enoxaparin Injectable 80 milliGRAM(s) SubCutaneous every 12 hours  pantoprazole  Injectable 40 milliGRAM(s) IV Push daily  dextrose 40% Gel 15 Gram(s) Oral once PRN  dextrose 50% Injectable 12.5 Gram(s) IV Push once  dextrose 50% Injectable 25 Gram(s) IV Push once  dextrose 50% Injectable 25 Gram(s) IV Push once  glucagon  Injectable 1 milliGRAM(s) IntraMuscular once PRN  insulin lispro (HumaLOG) corrective regimen sliding scale   SubCutaneous every 6 hours  levothyroxine Injectable 100 MICROGram(s) IV Push at bedtime  dextrose 5%. 1000 milliLiter(s) IV Continuous <Continuous>  artificial  tears Solution 1 Drop(s) Both EYES two times a day  chlorhexidine 0.12% Liquid 15 milliLiter(s) Oral Mucosa every 12 hours  chlorhexidine 2% Cloths 1 Application(s) Topical daily      Mode: AC/ CMV (Assist Control/ Continuous Mandatory Ventilation)  RR (machine): 34  TV (machine): 350  FiO2: 100  PEEP: 16  MAP: 25  PIP: 37      ICU Vital Signs Last 24 Hrs  T(C): 38.1 (2020 19:00), Max: 38.8 (2020 04:00)  T(F): 100.5 (2020 19:00), Max: 101.9 (2020 04:00)  HR: 110 (2020 19:00) (89 - 111)  BP: --  BP(mean): --  ABP: 144/76 (2020 19:00) (100/63 - 144/76)  ABP(mean): 97 (:00) (77 - 101)  RR: 34 (:00) (34 - 34)  SpO2: 96% (:00) (91% - 97%)    Vital Signs Last 24 Hrs  T(C): 38.1 (2020 19:00), Max: 38.8 (2020 04:00)  T(F): 100.5 (2020 19:00), Max: 101.9 (2020 04:00)  HR: 110 (:00) (89 - 111)  BP: --  BP(mean): --  RR: 34 (2020 19:00) (34 - 34)  SpO2: 96% (2020 19:00) (91% - 97%)    ABG - ( 2020 19:56 )  pH, Arterial: 7.21  pH, Blood: x     /  pCO2: 100   /  pO2: 118   / HCO3: 33    / Base Excess: 9.5   /  SaO2: 98          I&O's Detail    2020 07:01  -  2020 07:00  --------------------------------------------------------  IN:    argatroban Infusion: 91.8 mL    Enteral Tube Flush: 240 mL    Glucerna 1.5: 300 mL    HYDROmorphone Infusion.: 160 mL    HYDROmorphone Infusion.: 20 mL    midazolam Infusion: 51 mL    midazolam Infusion: 102 mL  Total IN: 964.8 mL    OUT:    Indwelling Catheter - Urethral: 1535 mL  Total OUT: 1535 mL  Total NET: -570.2 mL      2020 07:01  -  2020 20:45  --------------------------------------------------------  IN:    Enteral Tube Flush: 50 mL    HYDROmorphone Infusion.: 37 mL    midazolam Infusion: 91.8 mL  Total IN: 178.8 mL    OUT:    Indwelling Catheter - Urethral: 700 mL  Total OUT: 700 mL  Total NET: -521.2 mL      LABS:                        8.2    14.96 )-----------( 239      ( 2020 18:46 )             27.9         145  |  100  |  20.0  ----------------------------<  131<H>  4.7   |  34.0<H>  |  0.50    Ca    8.4<L>      2020 05:18  Phos  3.6       Mg     2.2         TPro  5.8<L>  /  Alb  3.1<L>  /  TBili  0.7  /  DBili  x   /  AST  36<H>  /  ALT  62<H>  /  AlkPhos  120        CAPILLARY BLOOD GLUCOSE  POCT Blood Glucose.: 138 mg/dL (2020 16:48)      Urinalysis Basic - ( 2020 14:54 )  Color: Yellow / Appearance: Clear / S.025 / pH: x  Gluc: x / Ketone: Trace  / Bili: Small / Urobili: 8   Blood: x / Protein: 30 mg/dL / Nitrite: Positive   Leuk Esterase: Trace / RBC: 6-10 /HPF / WBC 3-5   Sq Epi: x / Non Sq Epi: Few / Bacteria: TNTC      CULTURES:  Culture Results:   No growth at 5 days. ( @ 17:20)  Culture Results:   Normal Respiratory Fina present ( @ 03:40)        RADIOLOGY: ***      SUPPLEMENTAL O2:   LINES:  IVF:  HAMPTON:   PPx:   CONTACT: Patient is a 44y old  Female who presents with a chief complaint of SOB (2020 13:56)      BRIEF HOSPITAL COURSE:   43 yo female pmhx hypothyroidism and depression admitted  with COVID 19 respiratory failure with progression to ARDS requiring paralytic/proning therapy and distributive shock, completed plaquenil and received Actemra.     : MICU Consult worsening hypoxia, intubated, progression to ARDS.    : LE DVT, on heparin heparin therapy, thrombocytopenia     : HIT RADHA positive, transitioned to agatroban.     : HIT definitive AMADA negative for HIT.      : Transitioned from agatroban to FD Lovenox.       Events last 24 hours:  Transitioned from AC/VC to AC/PC due to patient dyschronous/triggering vent during day.  Called by RN this evening with critical AB.21/100/118/33/9.5.  Upon arrival to patient's room, high pressuring, triggering the vent.  RR increased from 33 to 36, tv increased from 330 to 350, peep decreased to 16, fio2 maintained at 100%. Patient continue to trigger despite increase in sedation. Rocuronium initiated.     PAST MEDICAL & SURGICAL HISTORY:  Depression  Hypothyroidism    Allergies  No Known Allergies      FAMILY HISTORY:  Unknown       Social History:   From home.       Review of Systems:  Unable to obtain 2/2 intubated/sedated      Medications:  meropenem  IVPB 1000 milliGRAM(s) IV Intermittent every 8 hours  vancomycin  IVPB 1000 milliGRAM(s) IV Intermittent every 12 hours  midodrine 15 milliGRAM(s) Oral every 8 hours  acetaminophen   Tablet .. 650 milliGRAM(s) Oral every 4 hours PRN  HYDROmorphone Infusion. 0.5 mG/Hr IV Continuous <Continuous>  midazolam Infusion 0.02 mG/kG/Hr IV Continuous <Continuous>  PARoxetine 10 milliGRAM(s) Oral daily  rocuronium Infusion 8 MICROgram(s)/kG/Min IV Continuous <Continuous>  enoxaparin Injectable 80 milliGRAM(s) SubCutaneous every 12 hours  pantoprazole  Injectable 40 milliGRAM(s) IV Push daily  dextrose 40% Gel 15 Gram(s) Oral once PRN  dextrose 50% Injectable 12.5 Gram(s) IV Push once  dextrose 50% Injectable 25 Gram(s) IV Push once  dextrose 50% Injectable 25 Gram(s) IV Push once  glucagon  Injectable 1 milliGRAM(s) IntraMuscular once PRN  insulin lispro (HumaLOG) corrective regimen sliding scale   SubCutaneous every 6 hours  levothyroxine Injectable 100 MICROGram(s) IV Push at bedtime  dextrose 5%. 1000 milliLiter(s) IV Continuous <Continuous>  artificial  tears Solution 1 Drop(s) Both EYES two times a day  chlorhexidine 0.12% Liquid 15 milliLiter(s) Oral Mucosa every 12 hours  chlorhexidine 2% Cloths 1 Application(s) Topical daily      Mode: AC/ CMV (Assist Control/ Continuous Mandatory Ventilation)  RR (machine): 34  TV (machine): 350  FiO2: 100  PEEP: 16  MAP: 25  PIP: 37      ICU Vital Signs Last 24 Hrs  T(C): 38.1 (2020 19:00), Max: 38.8 (2020 04:00)  T(F): 100.5 (2020 19:00), Max: 101.9 (2020 04:00)  HR: 110 (2020 19:00) (89 - 111)  BP: --  BP(mean): --  ABP: 144/76 (2020 19:00) (100/63 - 144/76)  ABP(mean): 97 (2020 19:00) (77 - 101)  RR: 34 (2020 19:00) (34 - 34)  SpO2: 96% (2020 19:00) (91% - 97%)    Vital Signs Last 24 Hrs  T(C): 38.1 (2020 19:00), Max: 38.8 (2020 04:00)  T(F): 100.5 (2020 19:00), Max: 101.9 (2020 04:00)  HR: 110 (2020 19:00) (89 - 111)  BP: --  BP(mean): --  RR: 34 (2020 19:00) (34 - 34)  SpO2: 96% (2020 19:00) (91% - 97%)    ABG - ( 2020 19:56 )  pH, Arterial: 7.21  pH, Blood: x     /  pCO2: 100   /  pO2: 118   / HCO3: 33    / Base Excess: 9.5   /  SaO2: 98          I&O's Detail    2020 07:01  -  2020 07:00  --------------------------------------------------------  IN:    argatroban Infusion: 91.8 mL    Enteral Tube Flush: 240 mL    Glucerna 1.5: 300 mL    HYDROmorphone Infusion.: 160 mL    HYDROmorphone Infusion.: 20 mL    midazolam Infusion: 51 mL    midazolam Infusion: 102 mL  Total IN: 964.8 mL    OUT:    Indwelling Catheter - Urethral: 1535 mL  Total OUT: 1535 mL  Total NET: -570.2 mL      2020 07:01  -  2020 20:45  --------------------------------------------------------  IN:    Enteral Tube Flush: 50 mL    HYDROmorphone Infusion.: 37 mL    midazolam Infusion: 91.8 mL  Total IN: 178.8 mL    OUT:    Indwelling Catheter - Urethral: 700 mL  Total OUT: 700 mL  Total NET: -521.2 mL      LABS:                        8.2    14.96 )-----------( 239      ( 2020 18:46 )             27.9         145  |  100  |  20.0  ----------------------------<  131<H>  4.7   |  34.0<H>  |  0.50    Ca    8.4<L>      2020 05:18  Phos  3.6       Mg     2.2         TPro  5.8<L>  /  Alb  3.1<L>  /  TBili  0.7  /  DBili  x   /  AST  36<H>  /  ALT  62<H>  /  AlkPhos  120        CAPILLARY BLOOD GLUCOSE  POCT Blood Glucose.: 138 mg/dL (2020 16:48)      Urinalysis Basic - ( 2020 14:54 )  Color: Yellow / Appearance: Clear / S.025 / pH: x  Gluc: x / Ketone: Trace  / Bili: Small / Urobili: 8   Blood: x / Protein: 30 mg/dL / Nitrite: Positive   Leuk Esterase: Trace / RBC: 6-10 /HPF / WBC 3-5   Sq Epi: x / Non Sq Epi: Few / Bacteria: TNTC      CULTURES:  Culture Results:   No growth at 5 days. ( @ 17:20)  Culture Results:   Normal Respiratory Fina present ( @ 03:40)        RADIOLOGY: ***      SUPPLEMENTAL O2:   LINES:  IVF:  HAMPTON:   PPx:   CONTACT:

## 2020-04-28 NOTE — PROGRESS NOTE ADULT - ASSESSMENT
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     #1 Hypoxic respiratory failure - due to COVID - has not made any progress still on high settings.   #2 LE DVT - on argatroban drip (HIT +)  #3 COVID 19 - supportive measures.  #4 Palliative care Encounter - goals addressed on 4/27, continued attempts at weaning.

## 2020-04-28 NOTE — PROGRESS NOTE ADULT - ASSESSMENT
44 year old female admitted with covid-19+ respiratory failure on vent who developed a left calf vein DVT while on therapeutic dose lovenox.    1) DVT - screening HIT-ab positive. AMADA negative.  argatroban discontinued. On lovenox with improvement in PLt count, monitor.    2) Covid-19+ - as per primary, ICU. On vent. Poor prognosis.    3) Leukocytosis - reactive, neutrophil predominant. Secondary to infection, covid. Monitor.    4. Normocytic anemia - hgb 8.8 today.   Monitor for bleeding. Transfuse if hgb <7.0.    Will follow.

## 2020-04-28 NOTE — PROGRESS NOTE ADULT - SUBJECTIVE AND OBJECTIVE BOX
[facility]  INFECTIOUS DISEASES AND INTERNAL MEDICINE at [place]  =======================================================  Kamran Leija MD  Diplomates American Board of Internal Medicine and Infectious Diseases  =======================================================    [id number]  BAUDILIO SHAH     Follow up: respiratory failure, covid19    On FIO2 100% with sat around 94.  Febrile with Tmax 101.9    PAST MEDICAL & SURGICAL HISTORY:  Depression  Hypothyroidism    Social Hx: unknown    FAMILY HISTORY: unknown     Allergies  No Known Allergies    Antibiotics:  piperacillin/tazobactam IVPB.. 3.375 Gram(s) IV Intermittent every 8 hours    REVIEW OF SYSTEMS:  Unable to answer     Physical Exam:  Vital Signs Last 24 Hrs  T(C): 37.6 (28 Apr 2020 12:00), Max: 38.8 (28 Apr 2020 04:00)  T(F): 99.6 (28 Apr 2020 12:00), Max: 101.9 (28 Apr 2020 04:00)  HR: 90 (28 Apr 2020 08:43) (89 - 111)  BP: --  BP(mean): --  RR: 34 (28 Apr 2020 08:00) (34 - 34)  SpO2: 97% (28 Apr 2020 08:43) (91% - 97%)  GEN: Intubated   HEENT: normocephalic and atraumatic. EOMI. PERRL.    NECK: Supple.  No lymphadenopathy   LUNGS: Clear to auscultation.  HEART: Regular rate and rhythm   ABDOMEN: Soft, nontender, and nondistended.  Positive bowel sounds.    EXTREMITIES: Without edema.  NEUROLOGIC: unable to perform   PSYCHIATRIC: sedated  SKIN: No rash    Labs:  04-28    145  |  100  |  20.0  ----------------------------<  131<H>  4.7   |  34.0<H>  |  0.50    Ca    8.4<L>      28 Apr 2020 05:18  Phos  3.6     04-28  Mg     2.2     04-28    TPro  5.8<L>  /  Alb  3.1<L>  /  TBili  0.7  /  DBili  x   /  AST  36<H>  /  ALT  62<H>  /  AlkPhos  120  04-28                        8.8    14.67 )-----------( 215      ( 28 Apr 2020 05:18 )             28.6     LIVER FUNCTIONS - ( 28 Apr 2020 05:18 )  Alb: 3.1 g/dL / Pro: 5.8 g/dL / ALK PHOS: 120 U/L / ALT: 62 U/L / AST: 36 U/L / GGT: x           ABG - ( 28 Apr 2020 05:01 )  pH, Arterial: 7.28  pH, Blood: x     /  pCO2: 85    /  pO2: 64    / HCO3: 35    / Base Excess: 11.5  /  SaO2: 91        RECENT CULTURES:  04-23 @ 17:20 .Blood Blood-Arterial     No growth at 48 hours    04-22 @ 03:40 .Sputum Sputum     Normal Respiratory Fina present    Moderate polymorphonuclear leukocytes per low power field  No Squamous epithelial cells per low power field  No organisms seen per oil power field    04-21 @ 15:44 .Blood Blood-Peripheral     No growth at 5 days.    All imaging and other data have been reviewed.  < from: Xray Chest 1 View- PORTABLE-Urgent (04.21.20 @ 20:24) >   EXAM:  XR CHEST PORTABLE URGENT 1V                        PROCEDURE DATE:  04/21/2020    INTERPRETATION:  History: COVID pneumonia and hypoxia  Portable radiograph of the chest is performed. comparison is made to 4/14/2020  ET tube NG tube and right IJ line are again noted. Patchy mid to lower lung infiltrates are mildly improved on the left. There is no pleural effusion. A right midline is again seen.  Impression: Mild improvement in left-sided infiltrate.  DISCRETE X-RAY DATA:  Percent of LEFT lung opacification: 1-33%  Percent of RIGHT lung opacification: 1-33%  Change in lung opacification from most recent x-ray (<=3 days): No Prior  Change from prior dated 3 or more days (same admission): Decrease    Assessment and Plan:   45 y/o woman with morbid obesity, Hypothyroidism and Depression was admitted on 4/8 with SOB, cough and congestion. She has a positive COVID19 test.   She got worse with respiratory failure and intubated on 4/14.  She has extensive bilateral airspace opacities may represent multifocal pneumonia.  ID has been called for possible bacterial superinfection due to fever, leukocytosis and increased PCT.   I doubt that she has bacterial infection, CRP is going down, passed the interleukin storm.    COVID 19 + infection  Viral pneumonia  Acute hypoxic respiratory failure  Transaminitis     - Blood culture 4/7 and 4/23 NGTD  - Sputum negative 4/22  - Airborne and contact isolation   - COVID 19 PCR positive   - CXR with multifocal opacities mild clearing in left side.   - Procalcitonin 0.1-->0.27-->0.23  - CRP=8.19-->1.71-->4.89, Ferritin and LDH high   - Completed Hydroxychloroquine   - Check CBC with diff, CMP with LFT's, Inflammatory markers (D-dimer, CRP, Ferritin, LDH,  procalcitonin)  q48h.    - S/P steroid  - Trend WBC 27K-->19k-->14k due to steroid   - Since cultures are negative and procal is low, will hold on antibiotics   - S/P one dose of Actemra 400mg on 4/14  - Supportive care and treating possible superinfections in the future is the main treatment, long time on FIO2 of 100%, not doing well.   Prognosis guarded.     Will follow PRN please call with any question.

## 2020-04-28 NOTE — CHART NOTE - NSCHARTNOTEFT_GEN_A_CORE
Source: Patient [ ]  Family [ ]   other [x ] EMR  Per Critical Care Note:   intubated and sedated  bilat air entry  reg rhythm  abd soft  trace sean  Impression and Plan  45 yo female with hypothyroidism, depression, obesity, admitted 4/8 with COVID19 viral pneumonia, intubated 4/14 with acute resp failure and ARDS.  Preserved renal function  ARDS/viral pneumonia  - Low Vt ARDSnet ventilation  Still requiring high fio2 and peep   Aware of palliative care involvement to determine GOC    Current Diet: Diet, NPO with Tube Feed:   Tube Feeding Modality: Orogastric  Glucerna 1.5 Juan  Total Volume for 24 Hours (mL): 720  Continuous  Starting Tube Feed Rate {mL per Hour}: 30  Until Goal Tube Feed Rate (mL per Hour): 30  Tube Feed Duration (in Hours): 24  Tube Feed Start Time: 07:00 (04-22-20 @ 16:56)      Enteral /Parenteral Nutrition: Glucerna 1.5 @ 30 ml/hr (x20hr) provides 600mL, 900kcal, 50g protein, 456mL free water. Additional free water per MD discretion.         Current Weight:   4/7/2020: 187.3#  No new wt recorded. 2+/3+ edema noted. Obtain daily weights to monitor trends  % Weight Change     Pertinent Medications: MEDICATIONS  (STANDING):  artificial  tears Solution 1 Drop(s) Both EYES two times a day  chlorhexidine 0.12% Liquid 15 milliLiter(s) Oral Mucosa every 12 hours  chlorhexidine 2% Cloths 1 Application(s) Topical daily  dextrose 5%. 1000 milliLiter(s) (50 mL/Hr) IV Continuous <Continuous>  dextrose 50% Injectable 12.5 Gram(s) IV Push once  dextrose 50% Injectable 25 Gram(s) IV Push once  dextrose 50% Injectable 25 Gram(s) IV Push once  enoxaparin Injectable 80 milliGRAM(s) SubCutaneous every 12 hours  HYDROmorphone Infusion. 0.5 mG/Hr (0.5 mL/Hr) IV Continuous <Continuous>  insulin lispro (HumaLOG) corrective regimen sliding scale   SubCutaneous every 6 hours  levothyroxine Injectable 100 MICROGram(s) IV Push at bedtime  midazolam Infusion 0.02 mG/kG/Hr (1.7 mL/Hr) IV Continuous <Continuous>  midodrine 15 milliGRAM(s) Oral every 8 hours  pantoprazole  Injectable 40 milliGRAM(s) IV Push daily  PARoxetine 10 milliGRAM(s) Oral daily    MEDICATIONS  (PRN):  acetaminophen   Tablet .. 650 milliGRAM(s) Oral every 4 hours PRN Temp greater or equal to 38.5C (101.3F)  dextrose 40% Gel 15 Gram(s) Oral once PRN Blood Glucose LESS THAN 70 milliGRAM(s)/deciLiter  glucagon  Injectable 1 milliGRAM(s) IntraMuscular once PRN Glucose <70 milliGRAM(s)/deciLiter    Pertinent Labs: CBC Full  -  ( 28 Apr 2020 05:18 )  WBC Count : 14.67 K/uL  RBC Count : 2.92 M/uL  Hemoglobin : 8.8 g/dL  Hematocrit : 28.6 %  Platelet Count - Automated : 215 K/uL  Mean Cell Volume : 97.9 fl  Mean Cell Hemoglobin : 30.1 pg  Mean Cell Hemoglobin Concentration : 30.8 gm/dL  Auto Neutrophil # : 13.53 K/uL  Auto Lymphocyte # : 0.38 K/uL  Auto Monocyte # : 0.51 K/uL  Auto Eosinophil # : 0.00 K/uL  Auto Basophil # : 0.00 K/uL  Auto Neutrophil % : 73.9 %  Auto Lymphocyte % : 2.6 %  Auto Monocyte % : 3.5 %  Auto Eosinophil % : 0.0 %  Auto Basophil % : 0.0 %  04-28 Na145 mmol/L Glu 131 mg/dL<H> K+ 4.7 mmol/L Cr  0.50 mg/dL BUN 20.0 mg/dL Phos 3.6 mg/dL Alb 3.1 g/dL<L> PAB n/a               Skin: Navid cheeks unstageable, left chin unstageable , L heell suspected DTI per documentation      Estimated Needs:   [ x] no change since previous assessment  [ ] recalculated:     Current Nutrition Diagnosis: Pt presents with severe acute protein calorie malnutrition in the setting of increased needs of COVID+ infection,  related to inadequate protein energy intake during NPO status with no feeding at all or feeding not at goal due to respiratory limitations (Pt is prone for 16 hrs noted), as evidenced by intake <75% est needs for >10 days, + 2 generalized edema and new development of multiple pressure injuries. aware of palliative care involvement and ongoing discussions for GOC.      Recommendations:   1) consider change to bolus regimen and increase total volume ? Bolus 250 ml glucerna q 6 hrs AS TOLERATED to provide  Glucerna 1.5  1000 ml = 1500 kcal, 83 g pro and 760 ml free water   2) add Yan BID to water flushes  3) MVI, Vit C, Thiamine , Vit D     Monitoring and Evaluation:   [ ] PO intake [x ] Tolerance to diet prescription [X] Weights  [X] Follow up per protocol [X] Labs:

## 2020-04-28 NOTE — PROGRESS NOTE ADULT - SUBJECTIVE AND OBJECTIVE BOX
44 year old female with hypothyroidism who was admitted to the hospital due to covid-19 pneumonia and respiratory failure and was intubated on 4/14/2020.   She was put on lovenox 40mg bid from 4/8/20-4/14/20 but was found to covid-19+ so her DVT prophylaxis dose was increased to 80mg BID. Her platelet count was 261k on 4/15/2020. On 4/12/2020 it was 613k but reactive and increased only once and then went back to baseline.   However, he was found to have a LE DVT on 4/21/2020. She was then put on a heparin drip from 4/21/20-4/23/2020 when the screening HIT antibody screen was positive so her AC was changed to argatroban.  Her platelet count reached a peak of 273k on 4/21/2020 and to a aroldo so far of 153k on 4/24/2020.      INTERVAL HISTORY    chart reviewed. patient intubated. no bleeding noted. plt count 215 today      MEDICATIONS  (STANDING):  artificial  tears Solution 1 Drop(s) Both EYES two times a day  chlorhexidine 0.12% Liquid 15 milliLiter(s) Oral Mucosa every 12 hours  chlorhexidine 2% Cloths 1 Application(s) Topical daily  dextrose 5%. 1000 milliLiter(s) (50 mL/Hr) IV Continuous <Continuous>  dextrose 50% Injectable 12.5 Gram(s) IV Push once  dextrose 50% Injectable 25 Gram(s) IV Push once  dextrose 50% Injectable 25 Gram(s) IV Push once  enoxaparin Injectable 80 milliGRAM(s) SubCutaneous every 12 hours  HYDROmorphone Infusion. 0.5 mG/Hr (0.5 mL/Hr) IV Continuous <Continuous>  insulin lispro (HumaLOG) corrective regimen sliding scale   SubCutaneous every 6 hours  levothyroxine Injectable 100 MICROGram(s) IV Push at bedtime  midazolam Infusion 0.02 mG/kG/Hr (1.7 mL/Hr) IV Continuous <Continuous>  midodrine 15 milliGRAM(s) Oral every 8 hours  pantoprazole  Injectable 40 milliGRAM(s) IV Push daily  PARoxetine 10 milliGRAM(s) Oral daily      Vital Signs Last 24 Hrs  T(C): 37.6 (28 Apr 2020 08:36), Max: 38.9 (27 Apr 2020 12:44)  T(F): 99.7 (28 Apr 2020 08:36), Max: 102.1 (27 Apr 2020 12:44)  HR: 89 (28 Apr 2020 08:00) (89 - 116)  BP: --  BP(mean): --  RR: 34 (28 Apr 2020 08:00) (28 - 34)  SpO2: 94% (28 Apr 2020 08:00) (91% - 95%)  exam defererd                   8.8                  145  | 34.0 | 20.0         14.67 >-----------< 215     ------------------------< 131                   28.6                 4.7  | 100  | 0.50                                         Ca 8.4   Mg 2.2   Ph 3.6

## 2020-04-28 NOTE — CHART NOTE - NSCHARTNOTEFT_GEN_A_CORE
Upon Nutritional Assessment by the Registered Dietitian your patient was determined to meet criteria / has evidence of the following diagnosis/diagnoses:          [x ] Severe Protein Calorie Malnutrition        Findings as based on:  •  Comprehensive nutrition assessment and consultation  •  Calorie counts (nutrient intake analysis)        Treatment:    The following diet has been recommended:    1)  change to bolus regimen and increase total volume ? Bolus 250 ml glucerna q 6 hrs AS TOLERATED to provide  Glucerna 1.5  1000 ml = 1500 kcal, 83 g pro and 760 ml free water - additional free water per MD discretion.  2) add Yan BID to water flushes  3) MVI, Vit C, Thiamine , zinc  PROVIDER Section:     By signing this assessment you are acknowledging and agree with the diagnosis/diagnoses assigned by the Registered Dietitian    Comments:

## 2020-04-28 NOTE — PROGRESS NOTE ADULT - ASSESSMENT
NEURO: Dialudid and versed gtt for sedated.  Rocuronium gtt added this evening.   CV: Midodrine for bp support.   RESP: Hypoxic/hypercapnic respiratory failure in setting of COVID19 viral with superimposed pna.  Transitioned to AC/PC, pressure support 22.  ABG with worsening hypercapnia, RR/TV increased.  Peep decreased from 18 to 16.  Repeat abg ordered.  Titrating fio2 and peep fro spo2 >88%, weaning as tolerated.   RENAL: Cook in place. Monitor lytes, replace as needed   GI: NPO with tf, PPI therapy   ENDO: Hypothyroidism on synthroid.   ID: Meropenm and vancomycin for pna.   HEME: FD lovenox for DVT  DISPO: DNR.     Critical Care time: 40 mins assessing presenting problems of acute illness that poses high probability of life threatening deterioration or end organ damage/dysfunction.  Medical decision making including Initiating plan of care, reviewing data, reviewing radiology, discussing with multidisciplinary team, non inclusive of procedures, discussing goals of care with patient/family

## 2020-04-28 NOTE — PROGRESS NOTE ADULT - SUBJECTIVE AND OBJECTIVE BOX
OVERNIGHT EVENTS: remains ventilator dependent     Present Symptoms:     no changes from yesterdays symptoms, refer to that note for details    MEDICATIONS  (STANDING):  artificial  tears Solution 1 Drop(s) Both EYES two times a day  chlorhexidine 0.12% Liquid 15 milliLiter(s) Oral Mucosa every 12 hours  chlorhexidine 2% Cloths 1 Application(s) Topical daily  dextrose 5%. 1000 milliLiter(s) (50 mL/Hr) IV Continuous <Continuous>  dextrose 50% Injectable 12.5 Gram(s) IV Push once  dextrose 50% Injectable 25 Gram(s) IV Push once  dextrose 50% Injectable 25 Gram(s) IV Push once  enoxaparin Injectable 80 milliGRAM(s) SubCutaneous every 12 hours  HYDROmorphone Infusion. 0.5 mG/Hr (0.5 mL/Hr) IV Continuous <Continuous>  insulin lispro (HumaLOG) corrective regimen sliding scale   SubCutaneous every 6 hours  levothyroxine Injectable 100 MICROGram(s) IV Push at bedtime  midazolam Infusion 0.02 mG/kG/Hr (1.7 mL/Hr) IV Continuous <Continuous>  midodrine 15 milliGRAM(s) Oral every 8 hours  pantoprazole  Injectable 40 milliGRAM(s) IV Push daily  PARoxetine 10 milliGRAM(s) Oral daily  rocuronium Injectable 50 milliGRAM(s) IV Push once    MEDICATIONS  (PRN):  acetaminophen   Tablet .. 650 milliGRAM(s) Oral every 4 hours PRN Temp greater or equal to 38.5C (101.3F)  dextrose 40% Gel 15 Gram(s) Oral once PRN Blood Glucose LESS THAN 70 milliGRAM(s)/deciLiter  glucagon  Injectable 1 milliGRAM(s) IntraMuscular once PRN Glucose <70 milliGRAM(s)/deciLiter    PHYSICAL EXAM:    Vital Signs Last 24 Hrs  T(C): 37.6 (28 Apr 2020 08:36), Max: 38.9 (27 Apr 2020 12:44)  T(F): 99.7 (28 Apr 2020 08:36), Max: 102.1 (27 Apr 2020 12:44)  HR: 90 (28 Apr 2020 08:43) (89 - 116)  BP: --  BP(mean): --  RR: 34 (28 Apr 2020 08:00) (28 - 34)  SpO2: 97% (28 Apr 2020 08:43) (91% - 97%)    See physical exam findings from 4/27, no changes     LABS:                          8.8    14.67 )-----------( 215      ( 28 Apr 2020 05:18 )             28.6     04-28    145  |  100  |  20.0  ----------------------------<  131<H>  4.7   |  34.0<H>  |  0.50    Ca    8.4<L>      28 Apr 2020 05:18  Phos  3.6     04-28  Mg     2.2     04-28    TPro  5.8<L>  /  Alb  3.1<L>  /  TBili  0.7  /  DBili  x   /  AST  36<H>  /  ALT  62<H>  /  AlkPhos  120  04-28    I&O's Summary    27 Apr 2020 07:01 - 28 Apr 2020 07:00  --------------------------------------------------------  IN: 964.8 mL / OUT: 1535 mL / NET: -570.2 mL    28 Apr 2020 07:01 - 28 Apr 2020 10:11  --------------------------------------------------------  IN: 0 mL / OUT: 50 mL / NET: -50 mL    RADIOLOGY & ADDITIONAL STUDIES:    ADVANCE DIRECTIVES: DNR

## 2020-04-28 NOTE — PROGRESS NOTE ADULT - SUBJECTIVE AND OBJECTIVE BOX
On Admission  04-08-20 (20d)  HPI:  A 43 y/o Female with PMHx significant for Hypothyroidism, Depression, presents today c/o SOB, nasal congestion, cough with clear sputum production, muscle aches and chills. Pt reports her symptoms started 3 days ago, she tried cough medicine but symptoms failed to improved. Also endorses fatigue, SOB and chest tightness that worsened today.   Patient denies any sick contact or recent travel history.   At the ED noted to desat to low 84' on RA, improved to 95% on 5-6L NC, Temp: 102.2; CXR:  Extensive bilateral airspace opacities may represent multifocal pneumonia (08 Apr 2020 02:47)    PAST MEDICAL & SURGICAL HISTORY:  Depression  Hypothyroidism      Antimicrobial:    Cardiovascular:  midodrine 15 milliGRAM(s) Oral every 8 hours    Pulmonary:    Hematalogic:  enoxaparin Injectable 80 milliGRAM(s) SubCutaneous every 12 hours    Other:  acetaminophen   Tablet .. 650 milliGRAM(s) Oral every 4 hours PRN  artificial  tears Solution 1 Drop(s) Both EYES two times a day  chlorhexidine 0.12% Liquid 15 milliLiter(s) Oral Mucosa every 12 hours  chlorhexidine 2% Cloths 1 Application(s) Topical daily  dextrose 40% Gel 15 Gram(s) Oral once PRN  dextrose 5%. 1000 milliLiter(s) IV Continuous <Continuous>  dextrose 50% Injectable 12.5 Gram(s) IV Push once  dextrose 50% Injectable 25 Gram(s) IV Push once  dextrose 50% Injectable 25 Gram(s) IV Push once  glucagon  Injectable 1 milliGRAM(s) IntraMuscular once PRN  HYDROmorphone Infusion. 1 mG/Hr IV Continuous <Continuous>  insulin lispro (HumaLOG) corrective regimen sliding scale   SubCutaneous every 6 hours  levothyroxine Injectable 100 MICROGram(s) IV Push at bedtime  midazolam Infusion 0.02 mG/kG/Hr IV Continuous <Continuous>  pantoprazole  Injectable 40 milliGRAM(s) IV Push daily  PARoxetine 10 milliGRAM(s) Oral daily      Drug Dosing Weight  Height (cm): 156 (07 Apr 2020 16:53)  Weight (kg): 85 (07 Apr 2020 16:53)  BMI (kg/m2): 34.9 (07 Apr 2020 16:53)  BSA (m2): 1.85 (07 Apr 2020 16:53)    T(C): 38.5 (04-27-20 @ 19:29), Max: 39 (04-27-20 @ 04:30)  HR: 101 (04-28-20 @ 00:00)  BP: --  BP(mean): --  ABP: 140/81 (04-28-20 @ 00:00)  ABP(mean): 101 (04-28-20 @ 00:00)  RR: 34 (04-28-20 @ 00:00)  SpO2: 91% (04-28-20 @ 00:00)    ABG - ( 26 Apr 2020 23:46 )  pH, Arterial: 7.38  pH, Blood: x     /  pCO2: 63    /  pO2: 72    / HCO3: 36    / Base Excess: 11.7  /  SaO2: 96                    04-26 @ 07:01  -  04-27 @ 07:00  --------------------------------------------------------  IN: 669.6 mL / OUT: 725 mL / NET: -55.4 mL        Mode: AC/ CMV (Assist Control/ Continuous Mandatory Ventilation)  RR (machine): 34  TV (machine): 350  FiO2: 100  PEEP: 16  MAP: 24  PIP: 38        LABS:  CBC Full  -  ( 27 Apr 2020 05:13 )  WBC Count : 11.88 K/uL  RBC Count : 2.97 M/uL  Hemoglobin : 8.6 g/dL  Hematocrit : 28.7 %  Platelet Count - Automated : 176 K/uL  Mean Cell Volume : 96.6 fl  Mean Cell Hemoglobin : 29.0 pg  Mean Cell Hemoglobin Concentration : 30.0 gm/dL  Auto Neutrophil # : 9.28 K/uL  Auto Lymphocyte # : 0.96 K/uL  Auto Monocyte # : 0.77 K/uL  Auto Eosinophil # : 0.43 K/uL  Auto Basophil # : 0.05 K/uL  Auto Neutrophil % : 78.1 %  Auto Lymphocyte % : 8.1 %  Auto Monocyte % : 6.5 %  Auto Eosinophil % : 3.6 %  Auto Basophil % : 0.4 %    04-27    145  |  100  |  20.0  ----------------------------<  101<H>  4.2   |  34.0<H>  |  0.53    Ca    8.2<L>      27 Apr 2020 05:13  Phos  3.4     04-27  Mg     2.2     04-27    TPro  5.7<L>  /  Alb  3.0<L>  /  TBili  0.6  /  DBili  x   /  AST  32<H>  /  ALT  77<H>  /  AlkPhos  112  04-27          ____________________________________________________________________________________________________

## 2020-04-28 NOTE — PROGRESS NOTE ADULT - ASSESSMENT
Exam:  intubated and sedated  bilat air entry  reg rhythm  abd soft  trace edema    Impression and Plan  45 yo female with hypothyroidism, depression, obesity, admitted 4/8 with COVID19 viral pneumonia, intubated 4/14 with acute resp failure and ARDS.  Preserved renal function.    ARDS/viral pneumonia  - Low Vt ARDSnet ventilation  Still requiring high fio2 and peep    Hypothyroid  - on synthroid     Glucose control: blood sugars well controlled on lispro sliding scale  DVT prophylaxis: Full dose lovenox (switched from Argatroban when serotonin release assay came back negative)  GI prophylaxis: protonix  Diet: tube feeds with glucerna  Family engagement: family to be contacted during daytime

## 2020-04-29 NOTE — CHART NOTE - NSCHARTNOTEFT_GEN_A_CORE
Minimal change in oxygenation with proning, ventilation improved  post plaquenil, steroids, actemra, Day 21  will discuss NO trial with RT  prognosis extremely guarded  remains DNR Minimal change in oxygenation with proning, ventilation improved  post plaquenil, steroids, actemra, Day 21  will discuss NO trial with RT  ID re called , has GNR bacteremia  prognosis extremely guarded  remains DNR

## 2020-04-29 NOTE — CHART NOTE - NSCHARTNOTEFT_GEN_A_CORE
Patient's brother Lawrence Lindsay called for update and general condition of his sister.  Family updated, we discussed patient's likely poor prognosis, Lawrence is very hopeful for patient's recovery.  All questions answered.

## 2020-04-29 NOTE — PROGRESS NOTE ADULT - ASSESSMENT
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  Anemia  Mildly worsening leukocytosis  UTI  Liver enzymes improving  Worsening metabolic alkalosis likely due to dehydration  Hypernatremia  Normal renal function  H/o hypothyroid on thyroid replacement therapy    Rec:    Pulm: Continue vent support; follow ABG and adjust settings as needed; consider prone ventilation if needed  ID: Follow wbc; antimicrobials per ID for UTI; follow inflammatory markers as needed  Renal: Follow renal function; follow lytes and correct as needed; add free water for hypernatremia and if no improvement could consider 1/2 NS IVF hydration  GI: Follow liver enzymes but improving  Heme: Per heme: screening HIT-ab positive. AMADA negative; argatroban discontinued. Changed from argatroban to lovenox with improvement in PLt count, but will continue to monitor; follow H/H given anemia  Endo: Follow blood sugar but has been acceptable and has not required insulin; decrease FS; on levothyroxine for hypothyroid with improved TSH  CVS: Follow BP and control as needed  Neuro: Adjust sedation as needed  FEN: TF as tolerates/adjust IVF as needed  Overall poor prognosis  Palliative care following    Reviewed on rounds with resident

## 2020-04-29 NOTE — PROGRESS NOTE ADULT - SUBJECTIVE AND OBJECTIVE BOX
[facility]  INFECTIOUS DISEASES AND INTERNAL MEDICINE at [place]  =======================================================  Kamran Leija MD  Diplomates American Board of Internal Medicine and Infectious Diseases  =======================================================    [id number]  BAUDILIO SHAH     Follow up: respiratory failure, covid19    for fever had Blood culture from , now reported GNR, Ecoli.   Still febrile.     PAST MEDICAL & SURGICAL HISTORY:  Depression  Hypothyroidism    Social Hx: unknown    FAMILY HISTORY: unknown     Allergies  No Known Allergies    Antibiotics:  Was on piperacillin/tazobactam until     REVIEW OF SYSTEMS:  Unable to answer     Physical Exam:  Vital Signs Last 24 Hrs  T(C): 38.4 (2020 12:00), Max: 39 (2020 00:00)  T(F): 101.2 (2020 12:00), Max: 102.2 (2020 00:00)  HR: 113 (2020 16:00) (95 - 129)  RR: 34 (2020 16:00) (27 - 38)  SpO2: 91% (2020 16:00) (90% - 99%)  GEN: Intubated   HEENT: normocephalic and atraumatic. EOMI. PERRL.    NECK: Supple.  No lymphadenopathy   LUNGS: Clear to auscultation.  HEART: Regular rate and rhythm   ABDOMEN: Soft, nontender, and nondistended.  Positive bowel sounds.    EXTREMITIES: Without edema.  NEUROLOGIC: unable to perform   PSYCHIATRIC: sedated  SKIN: No rash    Labs:      146<H>  |  100  |  20.0  ----------------------------<  101<H>  5.2   |  37.0<H>  |  0.54    Ca    8.4<L>      2020 05:52  Phos  3.9       Mg     2.4         TPro  6.3<L>  /  Alb  3.1<L>  /  TBili  0.4  /  DBili  x   /  AST  34<H>  /  ALT  54<H>  /  AlkPhos  130<H>                          8.4    17.27 )-----------( 278      ( 2020 05:52 )             29.5     Urinalysis Basic - ( 2020 14:54 )    Color: Yellow / Appearance: Clear / S.025 / pH: x  Gluc: x / Ketone: Trace  / Bili: Small / Urobili: 8   Blood: x / Protein: 30 mg/dL / Nitrite: Positive   Leuk Esterase: Trace / RBC: 6-10 /HPF / WBC 3-5   Sq Epi: x / Non Sq Epi: Few / Bacteria: TNTC    LIVER FUNCTIONS - ( 2020 05:52 )  Alb: 3.1 g/dL / Pro: 6.3 g/dL / ALK PHOS: 130 U/L / ALT: 54 U/L / AST: 34 U/L / GGT: x           ABG - ( 2020 10:03 )  pH, Arterial: 7.30  pH, Blood: x     /  pCO2: 58    /  pO2: 85    / HCO3: 26    / Base Excess: 2.0   /  SaO2: 98          RECENT CULTURES:   @ 14:55 .Blood Blood Blood Culture PCR    Growth in aerobic and anaerobic bottles: Gram Negative Rods  Aerobic Bottle: 11:16 Hours to positivity  Anaerobic Bottle: 11:36 Hours to positivity     @ 17:20 .Blood Blood-Arterial     No growth at 5 days.     @ 03:40 .Sputum Sputum     Normal Respiratory Fina present    Moderate polymorphonuclear leukocytes per low power field  No Squamous epithelial cells per low power field  No organisms seen per oil power field     @ 15:44 .Blood Blood-Peripheral     No growth at 5 days.      All imaging and other data have been reviewed.  < from: Xray Chest 1 View- PORTABLE-Urgent (20 @ 20:24) >   EXAM:  XR CHEST PORTABLE URGENT 1V                        PROCEDURE DATE:  2020    INTERPRETATION:  History: COVID pneumonia and hypoxia  Portable radiograph of the chest is performed. comparison is made to 2020  ET tube NG tube and right IJ line are again noted. Patchy mid to lower lung infiltrates are mildly improved on the left. There is no pleural effusion. A right midline is again seen.  Impression: Mild improvement in left-sided infiltrate.  DISCRETE X-RAY DATA:  Percent of LEFT lung opacification: 1-33%  Percent of RIGHT lung opacification: 1-33%  Change in lung opacification from most recent x-ray (<=3 days): No Prior  Change from prior dated 3 or more days (same admission): Decrease    Assessment and Plan:   43 y/o woman with morbid obesity, Hypothyroidism and Depression was admitted on  with SOB, cough and congestion. She has a positive COVID19 test.   She got worse with respiratory failure and intubated on .  She has extensive bilateral airspace opacities may represent multifocal pneumonia.  ID has been called for possible bacterial superinfection due to fever, leukocytosis and increased PCT.   I doubt that she has bacterial infection, CRP is going down, passed the interleukin storm.    COVID 19 + infection  Viral pneumonia  Acute hypoxic respiratory failure  Transaminitis     - Blood culture  and  NGTD  - Blood culture from  with GNT, Ecoli will follow sensitivity   - Sputum negative   - Airborne and contact isolation   - COVID 19 PCR positive   - CXR with multifocal opacities mild clearing in left side.   - Procalcitonin 0.1-->0.27-->0.23  - CRP=8.19-->1.71-->4.89  - Completed Hydroxychloroquine   - Check CBC with diff, CMP with LFT's, Inflammatory markers (D-dimer, CRP, Ferritin, LDH,  procalcitonin)  q48h.    - S/P steroid  - Trend WBC 27K-->19k-->14k--> 17k   - S/P one dose of Actemra 400mg on   - Will start Meropenem 1gm q8h until Sensitivity of culture is back  - Hold vancomycin     Will follow.

## 2020-04-29 NOTE — PROGRESS NOTE ADULT - ASSESSMENT
44 year old female admitted with covid-19+ respiratory failure on vent who developed a left calf vein DVT while on therapeutic dose lovenox.    1) DVT - screening HIT-ab positive. AMADA negative.  argatroban discontinued. On lovenox with improvement in PLt count and within normal range, monitor.     2) Covid-19+ - as per primary, ICU. On vent. Poor prognosis.    3) Leukocytosis - reactive, neutrophil predominant. Secondary to infection, covid. Monitor.    4. Normocytic anemia - hgb 8.4 today.  Monitor for bleeding. Transfuse if hgb <7.0.    Will follow.

## 2020-04-29 NOTE — PROGRESS NOTE ADULT - ASSESSMENT
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     #1 Hypoxic respiratory failure - due to COVID - has not made any progress still on high settings.   #2 LE DVT - on argatroban drip (HIT +)  #3 COVID 19 - supportive measures.  #4 Palliative care Encounter - continuing to follow along.

## 2020-04-29 NOTE — PROGRESS NOTE ADULT - SUBJECTIVE AND OBJECTIVE BOX
OVERNIGHT EVENTS: no acute changes, remains vent dependent     Present Symptoms:           Dyspnea: vented   Nausea/Vomiting: unable   Anxiety:  unable   Depression: unable  Fatigue: unable   Loss of appetite: unable     Pain: none             Character-            Duration-            Effect-            Factors-            Frequency-            Location-            Severity-    Review of Systems: Reviewed                  Unable to obtain due to poor mentation     MEDICATIONS  (STANDING):  artificial  tears Solution 1 Drop(s) Both EYES two times a day  chlorhexidine 0.12% Liquid 15 milliLiter(s) Oral Mucosa every 12 hours  chlorhexidine 2% Cloths 1 Application(s) Topical daily  dextrose 5%. 1000 milliLiter(s) (50 mL/Hr) IV Continuous <Continuous>  dextrose 50% Injectable 12.5 Gram(s) IV Push once  dextrose 50% Injectable 25 Gram(s) IV Push once  dextrose 50% Injectable 25 Gram(s) IV Push once  enoxaparin Injectable 80 milliGRAM(s) SubCutaneous every 12 hours  HYDROmorphone Infusion. 0.5 mG/Hr (0.5 mL/Hr) IV Continuous <Continuous>  insulin lispro (HumaLOG) corrective regimen sliding scale   SubCutaneous every 6 hours  levothyroxine Injectable 100 MICROGram(s) IV Push at bedtime  meropenem  IVPB 1000 milliGRAM(s) IV Intermittent every 8 hours  midazolam Infusion 0.02 mG/kG/Hr (1.7 mL/Hr) IV Continuous <Continuous>  midodrine 15 milliGRAM(s) Oral every 8 hours  pantoprazole  Injectable 40 milliGRAM(s) IV Push daily  PARoxetine 10 milliGRAM(s) Oral daily  rocuronium Infusion 8 MICROgram(s)/kG/Min (8.16 mL/Hr) IV Continuous <Continuous>  vancomycin  IVPB 1000 milliGRAM(s) IV Intermittent every 12 hours    MEDICATIONS  (PRN):  acetaminophen   Tablet .. 650 milliGRAM(s) Oral every 4 hours PRN Temp greater or equal to 38.5C (101.3F)  dextrose 40% Gel 15 Gram(s) Oral once PRN Blood Glucose LESS THAN 70 milliGRAM(s)/deciLiter  glucagon  Injectable 1 milliGRAM(s) IntraMuscular once PRN Glucose <70 milliGRAM(s)/deciLiter    PHYSICAL EXAM:    Vital Signs Last 24 Hrs  T(C): 37.6 (2020 07:48), Max: 39 (2020 00:00)  T(F): 99.7 (2020 07:48), Max: 102.2 (2020 00:00)  HR: 101 (2020 08:00) (98 - 129)  BP: --  BP(mean): --  RR: 38 (2020 08:00) (27 - 38)  SpO2: 99% (2020 08:00) (93% - 99%)    See physical exam finding from primary team's note    LABS:                        8.4    17.27 )-----------( 278      ( 2020 05:52 )             29.5     04-29    146<H>  |  100  |  20.0  ----------------------------<  101<H>  5.2   |  37.0<H>  |  0.54    Ca    8.4<L>      2020 05:52  Phos  3.9       Mg     2.4     29    TPro  6.3<L>  /  Alb  3.1<L>  /  TBili  0.4  /  DBili  x   /  AST  34<H>  /  ALT  54<H>  /  AlkPhos  130<H>  04-29    Urinalysis Basic - ( 2020 14:54 )    Color: Yellow / Appearance: Clear / S.025 / pH: x  Gluc: x / Ketone: Trace  / Bili: Small / Urobili: 8   Blood: x / Protein: 30 mg/dL / Nitrite: Positive   Leuk Esterase: Trace / RBC: 6-10 /HPF / WBC 3-5   Sq Epi: x / Non Sq Epi: Few / Bacteria: TNTC    I&O's Summary    2020 07:  -  2020 07:00  --------------------------------------------------------  IN: 710.4 mL / OUT: 1575 mL / NET: -864.6 mL    2020 07:01  -  2020 10:10  --------------------------------------------------------  IN: 68.4 mL / OUT: 175 mL / NET: -106.6 mL    RADIOLOGY & ADDITIONAL STUDIES:    ADVANCE DIRECTIVES: DNR

## 2020-04-29 NOTE — PROGRESS NOTE ADULT - SUBJECTIVE AND OBJECTIVE BOX
PULMONARY PROGRESS NOTE      BAUDILIO SHAH  MRN-[id number]    Patient is a 44y old  Female who presents with a chief complaint of SOB (2020 20:44)      INTERVAL HPI/OVERNIGHT EVENTS:    Pt intubated on vent    MEDICATIONS  (STANDING):  artificial  tears Solution 1 Drop(s) Both EYES two times a day  chlorhexidine 0.12% Liquid 15 milliLiter(s) Oral Mucosa every 12 hours  chlorhexidine 2% Cloths 1 Application(s) Topical daily  dextrose 5%. 1000 milliLiter(s) (50 mL/Hr) IV Continuous <Continuous>  dextrose 50% Injectable 12.5 Gram(s) IV Push once  dextrose 50% Injectable 25 Gram(s) IV Push once  dextrose 50% Injectable 25 Gram(s) IV Push once  enoxaparin Injectable 80 milliGRAM(s) SubCutaneous every 12 hours  HYDROmorphone Infusion. 0.5 mG/Hr (0.5 mL/Hr) IV Continuous <Continuous>  insulin lispro (HumaLOG) corrective regimen sliding scale   SubCutaneous every 6 hours  levothyroxine Injectable 100 MICROGram(s) IV Push at bedtime  meropenem  IVPB 1000 milliGRAM(s) IV Intermittent every 8 hours  midazolam Infusion 0.02 mG/kG/Hr (1.7 mL/Hr) IV Continuous <Continuous>  midodrine 15 milliGRAM(s) Oral every 8 hours  pantoprazole  Injectable 40 milliGRAM(s) IV Push daily  PARoxetine 10 milliGRAM(s) Oral daily  rocuronium Infusion 8 MICROgram(s)/kG/Min (8.16 mL/Hr) IV Continuous <Continuous>  vancomycin  IVPB 1000 milliGRAM(s) IV Intermittent every 12 hours      MEDICATIONS  (PRN):  acetaminophen   Tablet .. 650 milliGRAM(s) Oral every 4 hours PRN Temp greater or equal to 38.5C (101.3F)  dextrose 40% Gel 15 Gram(s) Oral once PRN Blood Glucose LESS THAN 70 milliGRAM(s)/deciLiter  glucagon  Injectable 1 milliGRAM(s) IntraMuscular once PRN Glucose <70 milliGRAM(s)/deciLiter      Allergies    No Known Allergies    Intolerances        PAST MEDICAL & SURGICAL HISTORY:  Depression  Hypothyroidism        REVIEW OF SYSTEMS: Pt intubated so unable to provide    Vital Signs Last 24 Hrs  T(C): 37.6 (2020 07:48), Max: 39 (2020 00:00)  T(F): 99.7 (2020 07:48), Max: 102.2 (2020 00:00)  HR: 106 (2020 04:00) (89 - 129)  BP: --  BP(mean): --  RR: 35 (2020 04:00) (27 - 36)  SpO2: 96% (2020 04:00) (93% - 97%)    PHYSICAL EXAMINATION: Deferred due to Covid 19 infection    GENERAL: The patient is intubated on vent.     HEENT: Head is normocephalic and atraumatic.    LUNGS: Respirations unlabored.    HEART: Regular rate and rhythm on monitor.    NEUROLOGIC: Sedated.    LABS:                        8.4    17.27 )-----------( 278      ( 2020 05:52 )             29.5         146<H>  |  100  |  20.0  ----------------------------<  101<H>  5.2   |  37.0<H>  |  0.54    Ca    8.4<L>      2020 05:52  Phos  3.9       Mg     2.4         TPro  6.3<L>  /  Alb  3.1<L>  /  TBili  0.4  /  DBili  x   /  AST  34<H>  /  ALT  54<H>  /  AlkPhos  130<H>        Urinalysis Basic - ( 2020 14:54 )    Color: Yellow / Appearance: Clear / S.025 / pH: x  Gluc: x / Ketone: Trace  / Bili: Small / Urobili: 8   Blood: x / Protein: 30 mg/dL / Nitrite: Positive   Leuk Esterase: Trace / RBC: 6-10 /HPF / WBC 3-5   Sq Epi: x / Non Sq Epi: Few / Bacteria: TNTC      ABG - ( 2020 05:02 )  pH, Arterial: 7.20  pH, Blood: x     /  pCO2: 100   /  pO2: 98    / HCO3: 34    / Base Excess: 9.8   /  SaO2: 98          Procalcitonin, Serum: 0.23 ng/mL (20 @ 05:13)      MICROBIOLOGY:    COVID-19 PCR . (20 @ 22:37)    COVID-19 PCR: Positive for 2019-nCoV NOTE: The COVID-19 assay has been cleared by the U.S. Food and Drug  Administration under the Emergency Use Authorization (EUA). [facility] is designated as a high complexity laboratory by the Clinical  Laboratory Improvement Amendments of 1988(CLIA) and is qualified to  perform  this test. ASSAY INFORMATION: Real Time RT-PCR  Performed By:  [facility]  [address] [place] [address]  Louis Khan M.D.        RADIOLOGY & ADDITIONAL STUDIES:       EXAM:  XR CHEST PORTABLE URGENT 1V                          PROCEDURE DATE:  2020          INTERPRETATION:  INDICATION: Covid pneumonia; follow-up assessment.    PRIORS: 2020 at 7:48 PM    VIEWS: Portable AP radiography of the chest performed.    FINDINGS: Heart size appears within normal limits. There is no change in position of the indwelling ETT, right IJ CVC or NGT. No superior mediastinal abnormalities are identified. Bilateral lung parenchymal airspace opacities are increased.No pleural effusion or pneumothorax is demonstrated. No mediastinal shift is noted. No acute osseous fractures identified.    IMPRESSION: Significant interval increase in bilateral lung parenchymal airspace opacities.        DISCRETE X-RAY DATA:  Percent of LEFT lung opacification: 34-66%  Percent of RIGHT lung opacification: 34-66%  Change in lung opacification from most recent x-ray (<=3 days): Increase  Change from prior dated 3 or more days (same admission): Increase        FELICIA SCHERER   This document has been electronically signed. 2020  4:08PM          ECHO:    Summary:   1. Technically difficult study. Limited information is available.   2. Patient was tachycardic with Hr in 130's during the examination.   3. Hyperdynamic global left ventricular systolic function.   4. Left ventricular ejection fraction, by visual estimation, is >75%.   5. Mild mitral annular calcification.   6. There is no evidence of pericardial effusion.    MD Lyn Electronically signed on 2020 at 4:07:45 PM

## 2020-04-29 NOTE — PROGRESS NOTE ADULT - SUBJECTIVE AND OBJECTIVE BOX
44 year old female with hypothyroidism who was admitted to the hospital due to covid-19 pneumonia and respiratory failure and was intubated on 4/14/2020.   She was put on lovenox 40mg bid from 4/8/20-4/14/20 but was found to covid-19+ so her DVT prophylaxis dose was increased to 80mg BID. Her platelet count was 261k on 4/15/2020. On 4/12/2020 it was 613k but reactive and increased only once and then went back to baseline.   However, he was found to have a LE DVT on 4/21/2020. She was then put on a heparin drip from 4/21/20-4/23/2020 when the screening HIT antibody screen was positive so her AC was changed to argatroban.  Her platelet count reached a peak of 273k on 4/21/2020 and to a aroldo so far of 153k on 4/24/2020.      INTERVAL HISTORY  chart reviewed. patient intubated. no bleeding noted. plt count 215 today. Febrile     MEDICATIONS  (STANDING):  artificial  tears Solution 1 Drop(s) Both EYES two times a day  chlorhexidine 0.12% Liquid 15 milliLiter(s) Oral Mucosa every 12 hours  chlorhexidine 2% Cloths 1 Application(s) Topical daily  dextrose 5%. 1000 milliLiter(s) (50 mL/Hr) IV Continuous <Continuous>  dextrose 50% Injectable 12.5 Gram(s) IV Push once  dextrose 50% Injectable 25 Gram(s) IV Push once  dextrose 50% Injectable 25 Gram(s) IV Push once  enoxaparin Injectable 80 milliGRAM(s) SubCutaneous every 12 hours  HYDROmorphone Infusion. 0.5 mG/Hr (0.5 mL/Hr) IV Continuous <Continuous>  insulin lispro (HumaLOG) corrective regimen sliding scale   SubCutaneous every 6 hours  levothyroxine Injectable 100 MICROGram(s) IV Push at bedtime  meropenem  IVPB 1000 milliGRAM(s) IV Intermittent every 8 hours  midazolam Infusion 0.02 mG/kG/Hr (1.7 mL/Hr) IV Continuous <Continuous>  midodrine 15 milliGRAM(s) Oral every 8 hours  pantoprazole  Injectable 40 milliGRAM(s) IV Push daily  PARoxetine 10 milliGRAM(s) Oral daily  rocuronium Infusion 8 MICROgram(s)/kG/Min (8.16 mL/Hr) IV Continuous <Continuous>    MEDICATIONS  (PRN):  acetaminophen   Tablet .. 650 milliGRAM(s) Oral every 4 hours PRN Temp greater or equal to 38.5C (101.3F)  dextrose 40% Gel 15 Gram(s) Oral once PRN Blood Glucose LESS THAN 70 milliGRAM(s)/deciLiter  glucagon  Injectable 1 milliGRAM(s) IntraMuscular once PRN Glucose <70 milliGRAM(s)/deciLiter    vitals noted  exam defererd                           8.4    17.27 )-----------( 278      ( 29 Apr 2020 05:52 )             29.5                       8.8                  145  | 34.0 | 20.0         14.67 >-----------< 215     ------------------------< 131                   28.6                 4.7  | 100  | 0.50                                         Ca 8.4   Mg 2.2   Ph 3.6

## 2020-04-30 NOTE — CHART NOTE - NSCHARTNOTEFT_GEN_A_CORE
The trial, [id number]: Expanded Access to Convalescent Plasma for the Treatment of Patients with COVID-19, was discussed with Lawrence and Tanikamaribel Lindsay patient's siblings and witnessed by JOSE M Weinstein.     During the consent process, the following was discussed:     •	The fact that the study involves research  •	The study schedule and procedures involved  •	The main risks of the study, and the fact that all risks may not be known at this time  •	New information that may affect the subject’s willingness to continue on the study will be presented as soon as it is available  •	Benefits of participating  •	Alternatives to participating  •	Confidentiality   •	Compensation for research-related injury  •	Contacts for questions about the study or their rights while on the study  •	The fact that the subject’s participation is voluntary – they can refuse or withdraw at any time without penalty or loss of benefits.    Both siblings were given ample time to ask questions. All questions were answered to their satisfaction.    Patient was intubated so asked family to meet in hospital lobby.     Informed consent was obtained prior to any study procedures being performed. A copy of the signed consent and supplemental documentation was given to the subject.    Eligibility Criteria:   Patient has laboratory confirmed diagnosis of infection with SARS-CoV-2 on _4__/__7_/__20___ and admitted to an acute care facility for treatment of COVID-19 complications.     Patient has life threatening COVID-19 with the following symptoms:    - Blood oxygen saturation less than 93%  - Partial Pressure of arterial oxygen to fraction of inspired oxygen ratio < 300   - Lung infiltrates >50% within 24 to 48 hours  - Respiratory failure   - Septic shock  - Multiple organ dysfunction or failure     Patient had the following medication administered prior to enrollment:   - Azithromycin  -Antibiotic therapy  -Steroids  -Chloroquine  - Hydroxychloroquine   - Actemra     ABO was performed on _4/30/2020.    I have confirmed all eligibility are met for this patient to participate.     Convalescent plasma order has been completed, pending plasma from blood bank.

## 2020-04-30 NOTE — PROGRESS NOTE ADULT - SUBJECTIVE AND OBJECTIVE BOX
· Subjective and Objective: 	  44 year old female with hypothyroidism who was admitted to the hospital due to covid-19 pneumonia and respiratory failure and was intubated on 4/14/2020.   She was put on lovenox 40mg bid from 4/8/20-4/14/20 but was found to covid-19+ so her DVT prophylaxis dose was increased to 80mg BID. Her platelet count was 261k on 4/15/2020. On 4/12/2020 it was 613k but reactive and increased only once and then went back to baseline.   However, he was found to have a LE DVT on 4/21/2020. She was then put on a heparin drip from 4/21/20-4/23/2020 when the screening HIT antibody screen was positive so her AC was changed to argatroban.  Her platelet count reached a peak of 273k on 4/21/2020 and to a aroldo so far of 153k on 4/24/2020.      INTERVAL HISTORY  chart reviewed. patient intubated. no bleeding noted. plt count 345 today. continues to be febrile     MEDICATIONS  (STANDING):  artificial  tears Solution 1 Drop(s) Both EYES two times a day  chlorhexidine 0.12% Liquid 15 milliLiter(s) Oral Mucosa every 12 hours  chlorhexidine 2% Cloths 1 Application(s) Topical daily  dextrose 5%. 1000 milliLiter(s) (50 mL/Hr) IV Continuous <Continuous>  dextrose 5%. 1000 milliLiter(s) (60 mL/Hr) IV Continuous <Continuous>  dextrose 50% Injectable 12.5 Gram(s) IV Push once  dextrose 50% Injectable 25 Gram(s) IV Push once  dextrose 50% Injectable 25 Gram(s) IV Push once  enoxaparin Injectable 80 milliGRAM(s) SubCutaneous every 12 hours  HYDROmorphone Infusion. 0.5 mG/Hr (0.5 mL/Hr) IV Continuous <Continuous>  insulin lispro (HumaLOG) corrective regimen sliding scale   SubCutaneous every 6 hours  levothyroxine Injectable 100 MICROGram(s) IV Push at bedtime  meropenem  IVPB 1000 milliGRAM(s) IV Intermittent every 8 hours  midazolam Infusion 0.02 mG/kG/Hr (1.7 mL/Hr) IV Continuous <Continuous>  midodrine 15 milliGRAM(s) Oral every 8 hours  pantoprazole  Injectable 40 milliGRAM(s) IV Push daily  PARoxetine 10 milliGRAM(s) Oral daily  potassium phosphate IVPB 15 milliMole(s) IV Intermittent once    MEDICATIONS  (PRN):  acetaminophen   Tablet .. 650 milliGRAM(s) Oral every 4 hours PRN Temp greater or equal to 38.5C (101.3F)  dextrose 40% Gel 15 Gram(s) Oral once PRN Blood Glucose LESS THAN 70 milliGRAM(s)/deciLiter  glucagon  Injectable 1 milliGRAM(s) IntraMuscular once PRN Glucose <70 milliGRAM(s)/deciLiter                          7.9    13.64 )-----------( 345      ( 30 Apr 2020 05:06 )             27.8   04-30    150<H>  |  103  |  20.0  ----------------------------<  84  4.6   |  37.0<H>  |  0.50    Ca    8.4<L>      30 Apr 2020 05:06  Phos  2.0     04-30  Mg     2.4     04-30    TPro  6.3<L>  /  Alb  3.1<L>  /  TBili  0.4  /  DBili  x   /  AST  34<H>  /  ALT  54<H>  /  AlkPhos  130<H>  04-29\    physical exam - deferred

## 2020-04-30 NOTE — PROGRESS NOTE ADULT - ASSESSMENT
Assessment and Plan:   · Assessment		  44 year old female admitted with covid-19+ respiratory failure on vent who developed a left calf vein DVT while on therapeutic dose lovenox.    1) DVT - screening HIT-ab positive. AMADA negative.  argatroban discontinued. On lovenox with improvement in PLt count and within normal range, monitor.     2) Covid-19+ - as per primary, ICU. On vent. Poor prognosis.    3) Leukocytosis - reactive, neutrophil predominant. Secondary to infection, covid. Monitor.    4. Normocytic anemia - hgb 7.8 today.  Monitor for bleeding. Transfuse if hgb <7.0.    Will follow.

## 2020-04-30 NOTE — PROGRESS NOTE ADULT - SUBJECTIVE AND OBJECTIVE BOX
[facility]  INFECTIOUS DISEASES AND INTERNAL MEDICINE at [place]  =======================================================  Kamran Leija MD  Diplomates American Board of Internal Medicine and Infectious Diseases  =======================================================    [id number]  BAUDILIO SHAH     Follow up: respiratory failure, covid19    Blood culture with a pansensitive Ecoli.   Still on FIO2 100% but So2 more acceptable around 96%.   Still has fever.     PAST MEDICAL & SURGICAL HISTORY:  Depression  Hypothyroidism    Social Hx: unknown    FAMILY HISTORY: unknown     Allergies  No Known Allergies    Antibiotics:  Was on piperacillin/tazobactam until     REVIEW OF SYSTEMS:  Unable to answer     Physical Exam:  Vital Signs Last 24 Hrs  T(C): 39.3 (2020 11:02), Max: 39.3 (2020 11:02)  T(F): 102.7 (2020 11:02), Max: 102.7 (2020 11:02)  HR: 115 (2020 12:00) (106 - 117)  RR: 38 (2020 12:00) (34 - 38)  SpO2: 96% (2020 12:00) (89% - 96%)  GEN: Intubated   HEENT: normocephalic and atraumatic. EOMI. PERRL.    NECK: Supple.  No lymphadenopathy   LUNGS: Clear to auscultation.  HEART: Regular rate and rhythm   ABDOMEN: Soft, nontender, and nondistended.  Positive bowel sounds.    EXTREMITIES: Without edema.  NEUROLOGIC: unable to perform   PSYCHIATRIC: sedated  SKIN: No rash    Labs:      150<H>  |  103  |  20.0  ----------------------------<  84  4.6   |  37.0<H>  |  0.50    Ca    8.4<L>      2020 05:06  Phos  2.0     -  Mg     2.4         TPro  6.3<L>  /  Alb  3.1<L>  /  TBili  0.4  /  DBili  x   /  AST  34<H>  /  ALT  54<H>  /  AlkPhos  130<H>                          7.9    13.64 )-----------( 345      ( 2020 05:06 )             27.8     Urinalysis Basic - ( 2020 14:54 )    Color: Yellow / Appearance: Clear / S.025 / pH: x  Gluc: x / Ketone: Trace  / Bili: Small / Urobili: 8   Blood: x / Protein: 30 mg/dL / Nitrite: Positive   Leuk Esterase: Trace / RBC: 6-10 /HPF / WBC 3-5   Sq Epi: x / Non Sq Epi: Few / Bacteria: TNTC    LIVER FUNCTIONS - ( 2020 05:52 )  Alb: 3.1 g/dL / Pro: 6.3 g/dL / ALK PHOS: 130 U/L / ALT: 54 U/L / AST: 34 U/L / GGT: x           ABG - ( 2020 11:23 )  pH, Arterial: 7.34  pH, Blood: x     /  pCO2: 76    /  pO2: 82    / HCO3: 38    / Base Excess: 14.0  /  SaO2: 97        RECENT CULTURES:   @ 14:55 .Blood Blood Escherichia coli    Growth in aerobic and anaerobic bottles: Escherichia coli  Aerobic Bottle: 11:16 Hours to positivity  Anaerobic Bottle: 11:36 Hours to positivity     @ 17:20 .Blood Blood-Arterial     No growth at 5 days.     @ 03:40 .Sputum Sputum     Normal Respiratory Fina present    Moderate polymorphonuclear leukocytes per low power field  No Squamous epithelial cells per low power field  No organisms seen per oil power field     @ 15:44 .Blood Blood-Peripheral     No growth at 5 days.      All imaging and other data have been reviewed.  < from: Xray Chest 1 View- PORTABLE-Urgent (20 @ 20:24) >   EXAM:  XR CHEST PORTABLE URGENT 1V                        PROCEDURE DATE:  2020    INTERPRETATION:  History: COVID pneumonia and hypoxia  Portable radiograph of the chest is performed. comparison is made to 2020  ET tube NG tube and right IJ line are again noted. Patchy mid to lower lung infiltrates are mildly improved on the left. There is no pleural effusion. A right midline is again seen.  Impression: Mild improvement in left-sided infiltrate.  DISCRETE X-RAY DATA:  Percent of LEFT lung opacification: 1-33%  Percent of RIGHT lung opacification: 1-33%  Change in lung opacification from most recent x-ray (<=3 days): No Prior  Change from prior dated 3 or more days (same admission): Decrease    Assessment and Plan:   43 y/o woman with morbid obesity, Hypothyroidism and Depression was admitted on  with SOB, cough and congestion. She has a positive COVID19 test.   She got worse with respiratory failure and intubated on .  She has extensive bilateral airspace opacities may represent multifocal pneumonia.  ID has been called for possible bacterial superinfection due to fever, leukocytosis and increased PCT.   I doubt that she has bacterial infection, CRP is going down, passed the interleukin storm.    COVID 19 + infection  Viral pneumonia  Acute hypoxic respiratory failure  Transaminitis     - Blood culture  and  NGTD  - Blood culture from  with Ecoli pansensitive   - Will repeat blood culture   - Sputum negative   - Airborne and contact isolation   - COVID 19 PCR positive   - CXR with multifocal opacities mild clearing in left side.   - Procalcitonin 0.1-->0.27-->0.23  - CRP=8.19-->1.71-->4.89  - Completed Hydroxychloroquine   - Check CBC with diff, CMP with LFT's, Inflammatory markers (D-dimer, CRP, Ferritin, LDH,  procalcitonin)  q48h.    - S/P steroid  - Trend WBC 27K-->19k-->14k--> 17k--13K  - S/P one dose of Actemra 400mg on   - Will switch meropenem to ceftriaxone 2mg daily.   - Awaiting family to sign the consent for convalescent plasma infusion.    Will follow.

## 2020-04-30 NOTE — PROGRESS NOTE ADULT - ASSESSMENT
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     #1 Hypoxic respiratory failure - due to COVID - has not made any progress still on high settings.   #2 LE DVT - on argatroban drip (HIT +)  #3 COVID 19 - supportive measures.  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     #1 Hypoxic respiratory failure - due to COVID - has not made any progress still on high settings.   #2 LE DVT - on argatroban drip (HIT +)  #3 COVID 19 - supportive measures.  #4 Palliative care Encounter - primary team has spoken to family already today regarding poor prognosis. Will reach out to them tomorrow.

## 2020-04-30 NOTE — PROGRESS NOTE ADULT - SUBJECTIVE AND OBJECTIVE BOX
OVERNIGHT EVENTS: no acute changes, remains ventilator dependent     Present Symptoms:     Dyspnea: vented   Nausea/Vomiting: unable   Anxiety:  unable   Depression: unable  Fatigue: unable   Loss of appetite: unable     Pain: none             Character-            Duration-            Effect-            Factors-            Frequency-            Location-            Severity-    Review of Systems: Reviewed                  Unable to obtain due to poor mentation     MEDICATIONS  (STANDING):  artificial  tears Solution 1 Drop(s) Both EYES two times a day  chlorhexidine 0.12% Liquid 15 milliLiter(s) Oral Mucosa every 12 hours  chlorhexidine 2% Cloths 1 Application(s) Topical daily  dextrose 5%. 1000 milliLiter(s) (50 mL/Hr) IV Continuous <Continuous>  dextrose 5%. 1000 milliLiter(s) (60 mL/Hr) IV Continuous <Continuous>  dextrose 50% Injectable 12.5 Gram(s) IV Push once  dextrose 50% Injectable 25 Gram(s) IV Push once  dextrose 50% Injectable 25 Gram(s) IV Push once  enoxaparin Injectable 80 milliGRAM(s) SubCutaneous every 12 hours  HYDROmorphone Infusion. 0.5 mG/Hr (0.5 mL/Hr) IV Continuous <Continuous>  insulin lispro (HumaLOG) corrective regimen sliding scale   SubCutaneous every 6 hours  levothyroxine Injectable 100 MICROGram(s) IV Push at bedtime  meropenem  IVPB 1000 milliGRAM(s) IV Intermittent every 8 hours  midazolam Infusion 0.02 mG/kG/Hr (1.7 mL/Hr) IV Continuous <Continuous>  midodrine 15 milliGRAM(s) Oral every 8 hours  pantoprazole  Injectable 40 milliGRAM(s) IV Push daily  PARoxetine 10 milliGRAM(s) Oral daily    MEDICATIONS  (PRN):  acetaminophen   Tablet .. 650 milliGRAM(s) Oral every 4 hours PRN Temp greater or equal to 38.5C (101.3F)  dextrose 40% Gel 15 Gram(s) Oral once PRN Blood Glucose LESS THAN 70 milliGRAM(s)/deciLiter  glucagon  Injectable 1 milliGRAM(s) IntraMuscular once PRN Glucose <70 milliGRAM(s)/deciLiter    PHYSICAL EXAM:    Vital Signs Last 24 Hrs  T(C): 38.6 (2020 07:45), Max: 39 (2020 20:00)  T(F): 101.5 (2020 07:45), Max: 102.2 (2020 20:00)  HR: 115 (2020 10:00) (106 - 117)  BP: --  BP(mean): --  RR: 38 (2020 10:00) (34 - 38)  SpO2: 95% (2020 10:00) (89% - 95%)    General: intubated     Karnofsky:  20 %    HEENT: ET tube    Lungs: comfortable     CV: normal      GI: OG tube     : pederson    MSK: weakness      Skin: no rash    LABS:                      7.9    13.64 )-----------( 345      ( 2020 05:06 )             27.8     04-30    150<H>  |  103  |  20.0  ----------------------------<  84  4.6   |  37.0<H>  |  0.50    Ca    8.4<L>      2020 05:06  Phos  2.0     04-30  Mg     2.4     04-30    TPro  6.3<L>  /  Alb  3.1<L>  /  TBili  0.4  /  DBili  x   /  AST  34<H>  /  ALT  54<H>  /  AlkPhos  130<H>  04-29    Urinalysis Basic - ( 2020 14:54 )    Color: Yellow / Appearance: Clear / S.025 / pH: x  Gluc: x / Ketone: Trace  / Bili: Small / Urobili: 8   Blood: x / Protein: 30 mg/dL / Nitrite: Positive   Leuk Esterase: Trace / RBC: 6-10 /HPF / WBC 3-5   Sq Epi: x / Non Sq Epi: Few / Bacteria: TNTC    I&O's Summary    2020 07:01  -  2020 07:00  --------------------------------------------------------  IN: 618.8 mL / OUT: 1325 mL / NET: -706.2 mL    2020 07:01  -  2020 11:45  --------------------------------------------------------  IN: 395 mL / OUT: 200 mL / NET: 195 mL    RADIOLOGY & ADDITIONAL STUDIES:    ADVANCE DIRECTIVES: DNR

## 2020-04-30 NOTE — PROGRESS NOTE ADULT - SUBJECTIVE AND OBJECTIVE BOX
Patient is a 44y old  Female who presents with a chief complaint of SOB (29 Apr 2020 17:29)      BRIEF HOSPITAL COURSE: 45 yo female pmhx hypothyroidism and depression admitted 4/8 with COVID 19 respiratory failure with progression to ARDS requiring paralytic/proning therapy and distributive shock, completed plaquenil and received Actemra. 4/14: MICU Consult worsening hypoxia, intubated, progression to ARDS. 4/22: LE DVT, on heparin heparin therapy, thrombocytopenia 4/23: HIT RADHA positive, transitioned to agatroban. 4/25: HIT definitive AMADA negative for HIT.  4/27: Transitioned from agatroban to FD Lovenox.     Transitioned from AC/VC to AC/PC due to patient dyschronous/triggering vent during day.  Paralyzed with Jay.         Events last 24 hours: On vent. A/C VC mode. 100% FIO2. Proning was not effective. On versed, dilaudid, jay. Full dose lovenox.     PAST MEDICAL & SURGICAL HISTORY:  Depression  Hypothyroidism        Medications:  meropenem  IVPB 1000 milliGRAM(s) IV Intermittent every 8 hours    midodrine 15 milliGRAM(s) Oral every 8 hours      acetaminophen   Tablet .. 650 milliGRAM(s) Oral every 4 hours PRN  HYDROmorphone Infusion. 0.5 mG/Hr IV Continuous <Continuous>  midazolam Infusion 0.02 mG/kG/Hr IV Continuous <Continuous>  PARoxetine 10 milliGRAM(s) Oral daily      enoxaparin Injectable 80 milliGRAM(s) SubCutaneous every 12 hours    pantoprazole  Injectable 40 milliGRAM(s) IV Push daily      dextrose 40% Gel 15 Gram(s) Oral once PRN  dextrose 50% Injectable 12.5 Gram(s) IV Push once  dextrose 50% Injectable 25 Gram(s) IV Push once  dextrose 50% Injectable 25 Gram(s) IV Push once  glucagon  Injectable 1 milliGRAM(s) IntraMuscular once PRN  insulin lispro (HumaLOG) corrective regimen sliding scale   SubCutaneous every 6 hours  levothyroxine Injectable 100 MICROGram(s) IV Push at bedtime    dextrose 5%. 1000 milliLiter(s) IV Continuous <Continuous>  dextrose 5%. 1000 milliLiter(s) IV Continuous <Continuous>  potassium phosphate IVPB 15 milliMole(s) IV Intermittent once      artificial  tears Solution 1 Drop(s) Both EYES two times a day  chlorhexidine 0.12% Liquid 15 milliLiter(s) Oral Mucosa every 12 hours  chlorhexidine 2% Cloths 1 Application(s) Topical daily        Mode: AC/ CMV (Assist Control/ Continuous Mandatory Ventilation)  RR (machine): 38  TV (machine): 450  FiO2: 100  PEEP: 12  MAP: 20  PIP: 41      ICU Vital Signs Last 24 Hrs  T(C): 38.6 (30 Apr 2020 07:45), Max: 39 (29 Apr 2020 20:00)  T(F): 101.5 (30 Apr 2020 07:45), Max: 102.2 (29 Apr 2020 20:00)  HR: 107 (30 Apr 2020 04:26) (95 - 117)  BP: --  BP(mean): --  ABP: 108/62 (30 Apr 2020 04:00) (104/59 - 124/69)  ABP(mean): 82 (30 Apr 2020 00:00) (73 - 87)  RR: 38 (30 Apr 2020 04:00) (34 - 38)  SpO2: 93% (30 Apr 2020 04:26) (90% - 96%)      ABG - ( 30 Apr 2020 03:50 )  pH, Arterial: 7.29  pH, Blood: x     /  pCO2: 86    /  pO2: 94    / HCO3: 37    / Base Excess: x     /  SaO2: 98                  I&O's Detail    29 Apr 2020 07:01  -  30 Apr 2020 07:00  --------------------------------------------------------  IN:    Enteral Tube Flush: 120 mL    HYDROmorphone Infusion.: 40 mL    midazolam Infusion: 222 mL    rocuronium Infusion: 136.8 mL    Solution: 100 mL  Total IN: 618.8 mL    OUT:    Indwelling Catheter - Urethral: 1325 mL  Total OUT: 1325 mL    Total NET: -706.2 mL            LABS:                        7.9    13.64 )-----------( 345      ( 30 Apr 2020 05:06 )             27.8     04-30    150<H>  |  103  |  20.0  ----------------------------<  84  4.6   |  37.0<H>  |  0.50    Ca    8.4<L>      30 Apr 2020 05:06  Phos  2.0     04-30  Mg     2.4     04-30    TPro  6.3<L>  /  Alb  3.1<L>  /  TBili  0.4  /  DBili  x   /  AST  34<H>  /  ALT  54<H>  /  AlkPhos  130<H>  04-29          CAPILLARY BLOOD GLUCOSE      POCT Blood Glucose.: 98 mg/dL (30 Apr 2020 05:33)           CULTURES:  Culture Results:   Growth in aerobic and anaerobic bottles: Escherichia coli  Aerobic Bottle: 11:16 Hours to positivity  Anaerobic Bottle: 11:36 Hours to positivity  TYPE: (C=Critical, N=Notification, A=Abnormal) C  TESTS:  _ GS  DATE/TIME CALLED: _ 04/29/2020 10:21:29  CALLED TO: Curt Smith RN  READ BACK (2 Patient Identifiers)(Y/N): _ Y  READ BACK VALUES (Y/N): _ Y  CALLED BY: Curt brewer (04-28 @ 14:55)  Culture Results:   Growth in aerobic and anaerobic bottles: Escherichia coli  Aerobic Bottle: 9:55 Hours to positivity  Anaerobic Bottle: 12:46 Hours to positivity  ***Blood Panel PCR results on this specimen are available  approximately 3 hours after the Gram stain result.***  Gram stain, PCR, and/or culture results may not always  correspond due to difference in methodologies.  ************************************************************  This PCR assay was performed using [employer].  The following targets are tested for: Enterococcus,  vancomycin resistant enterococci, Listeria monocytogenes,  coagulase negative staphylococci, S. aureus,  methicillin resistant S. aureus, Streptococcus agalactiae  (Group B), S. pneumoniae, S. pyogenes (Group A),  Acinetobacter baumannii, Enterobacter cloacae, E. coli,  Klebsiella oxytoca, K. pneumoniae, Proteus sp.,  Serratia marcescens, Haemophilus influenzae,  Neisseria meningitidis, Pseudomonas aeruginosa, Candida  albicans, C. glabrata, C krusei, C parapsilosis,  C. tropicalis and the KPC resistance gene.  TYPE: (C=Critical, N=Notification, A=Abnormal) C  TESTS:  _ GS  DATE/TIME CALLED: _ 04/29/2020 10:20:23  CALLED TO: Curt Smith RN  READ BACK (2 Patient Identifiers)(Y/N): _ Y  READ BACK VALUES (Y/N):_ Y  CALLED BY: Curt brewer (04-28 @ 14:55)  Culture Results:   No growth at 5 days. (04-23 @ 17:20)      Physical Examination:    General: on vent      HEENT: Pupils symmetric.    PULM: rales b/l, no wheeze     NECK: ETT    CVS: tachy, regular rhythm,      ABD: Soft, nondistended, nontender,     EXT: edema    SKIN: Warm and well perfused, no rashes noted.    NEURO: sedated    DEVICES:

## 2020-04-30 NOTE — PROGRESS NOTE ADULT - ASSESSMENT
-Vent dep resp failure  -Covid 19 infection  -ARDS  -Pneumonia  -Persistently febrile   -Normal platelets  HIT+ but AMADA neg per heme  Anemia  Mildly worsening leukocytosis  UTI  Liver enzymes improving  Worsening metabolic alkalosis likely due to dehydration  Hypernatremia  Normal renal function  H/o hypothyroid on thyroid replacement therapy    Rec:    Pulm: Continue vent support; follow ABG and adjust settings as needed; consider prone ventilation if needed  ID: Follow wbc; antimicrobials per ID for UTI; follow inflammatory markers as needed  Renal: Follow renal function; follow lytes and correct as needed; add free water for hypernatremia and if no improvement could consider 1/2 NS IVF hydration  GI: Follow liver enzymes but improving  Heme: Per heme: screening HIT-ab positive. AMADA negative; argatroban discontinued. Changed from argatroban to lovenox with improvement in PLt count, but will continue to monitor; follow H/H given anemia  Endo: Follow blood sugar but has been acceptable and has not required insulin; decrease FS; on levothyroxine for hypothyroid with improved TSH  CVS: Follow BP and control as needed  Neuro: Adjust sedation as needed  FEN: TF as tolerates/adjust IVF as needed  Overall poor prognosis  Palliative care following    Reviewed on rounds with resident -Vent dep resp failure  -Covid 19 infection  -ARDS; refractory hypoxia; Pl pressure 36; prone was not helpful  -Pneumonia  -Persistently febrile   -Normal platelets; HIT+ but AMADA neg per heme  -Anemia  -Mildly worsening leukocytosis  -UTI  -Liver enzymes improving  -Worsening metabolic alkalosis likely due to dehydration  -Hypernatremia  -Normal renal function  -H/o hypothyroid on thyroid replacement therapy    Prognosis very poor    RECC:  Trial of NO. Continue vent. Full dose A/C. Abx per ID. Follow lytes. Follow liver enzymes. Trial off paralytics. Continue sedation. TFs. Palliative care f/u.     PT is DNR.    Long d/w brother Lawrence. Explained poor prognosis. Low likelihood of survival based on current progress. He wants to keep trying everything. He remains hopeful though I told him to expect the worse. -Vent dep resp failure  -Covid 19 infection  -ARDS; refractory hypoxia; Pl pressure 36; prone was not helpful  -Pneumonia  -Persistently febrile   -Normal platelets; HIT+ but AMADA neg per heme  -Anemia  -Mildly worsening leukocytosis  -UTI  -Liver enzymes improving  -Worsening metabolic alkalosis likely due to dehydration  -Hypernatremia  -Normal renal function  -H/o hypothyroid on thyroid replacement therapy    Prognosis very poor    RECC:  Trial of NO. Continue vent. Full dose A/C. Abx per ID. Will d/w them convalescent plasma. Follow lytes. Follow liver enzymes. Trial off paralytics. Continue sedation. TFs. Palliative care f/u.     PT is DNR.    Long d/w brother Lawrence. Explained poor prognosis. Low likelihood of survival based on current progress. He wants to keep trying everything. He remains hopeful though I told him to expect the worse.

## 2020-05-01 NOTE — PROGRESS NOTE ADULT - ASSESSMENT
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     #1 Hypoxic respiratory failure - due to COVID - has not made any progress still on high settings.   #2 LE DVT - on argatroban drip (HIT +)  #3 COVID 19 - supportive measures.  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     #1 Hypoxic respiratory failure - due to COVID - has not made any progress still on high settings. very hypoxic today.   #2 LE DVT - on argatroban drip (HIT +)  #3 COVID 19 - supportive measures.  #4 Palliative care Encounter - very lengthy conversation with patient's 3 siblings by conference call using  Aruna with Nishant FRANCO. We discussed the poor prognosis given persistent hypoxia and ventilator dependent despite maximal settings and efforts. We discussed that patient has received all possible treatments for her COVID virus and unfortunately has not made any improvement and is unlikely to survive this hospitalization. Family is adamant about wanting their sister to receive Remdesevir treatment because the sister who lives in [facility] who is a nurse has seen it work with patients there and they heard it on the news that it works. We explained that she is not a candidate for multiple reasons - she has received plaquenil therapy and those who receive this are excluded from remdesevir; additionally she received plasma last night, and we do not currently have remdesevir available here. On top of those reasons, she has suffered significant lung damage and it is unlikely that at this stage that it will help her. They kept asking to give it to her and we explained on 4-5 separate occasions the reasons why she would not be able to receive it. We offered that they come see her or facetime in order to be able to say their goodbyes. Will arrange for facetime with RN staff. They kept stating, " please don't disconnect her from the machines." We explained that we are not discussing that at this time but we also want them to be prepared that she may die even without disconnecting her form the machines because her oxygen levels today are very low despite maximal ventilator support.

## 2020-05-01 NOTE — PROGRESS NOTE ADULT - SUBJECTIVE AND OBJECTIVE BOX
[facility]  INFECTIOUS DISEASES AND INTERNAL MEDICINE at [place]  =======================================================  Kamran Leija MD  Diplomates American Board of Internal Medicine and Infectious Diseases  =======================================================    [facility]-[id number]  BAUDILIO SHAH     Follow up: respiratory failure, covid19    Blood culture with a pansensitive Ecoli.   Still on FIO2 100% and now sat in 50s and 60s, getting worse.   Last night/early morning received the convalescent plasma.   Still has fever.   Actively deteriorating.     PAST MEDICAL & SURGICAL HISTORY:  Depression  Hypothyroidism    Social Hx: unknown    FAMILY HISTORY: unknown     Allergies  No Known Allergies    Antibiotics:  Was on piperacillin/tazobactam until 4/23    REVIEW OF SYSTEMS:  Unable to answer     Physical Exam:  Vital Signs Last 24 Hrs  T(C): 38.3 (01 May 2020 04:00), Max: 39.3 (30 Apr 2020 11:02)  T(F): 101 (01 May 2020 04:00), Max: 102.8 (30 Apr 2020 16:00)  HR: 112 (01 May 2020 09:46) (108 - 120)  RR: 34 (01 May 2020 08:00) (34 - 40)  SpO2: 80% (01 May 2020 09:46) (80% - 96%)  GEN: Intubated   HEENT: normocephalic and atraumatic. EOMI. PERRL.    NECK: Supple.  No lymphadenopathy   LUNGS: Clear to auscultation.  HEART: Regular rate and rhythm   ABDOMEN: Soft, nontender, and nondistended.  Positive bowel sounds.    EXTREMITIES: Without edema.  NEUROLOGIC: unable to perform   PSYCHIATRIC: sedated  SKIN: No rash    Labs:  05-01    146<H>  |  99  |  22.0<H>  ----------------------------<  119<H>  4.0   |  40.0<H>  |  0.52    Ca    8.0<L>      01 May 2020 04:24  Phos  2.2     05-01  Mg     2.4     05-01    TPro  6.1<L>  /  Alb  2.9<L>  /  TBili  0.5  /  DBili  x   /  AST  53<H>  /  ALT  34<H>  /  AlkPhos  98  05-01                        7.6    15.52 )-----------( 381      ( 01 May 2020 04:24 )             26.2     LIVER FUNCTIONS - ( 01 May 2020 04:24 )  Alb: 2.9 g/dL / Pro: 6.1 g/dL / ALK PHOS: 98 U/L / ALT: 34 U/L / AST: 53 U/L / GGT: x           ABG - ( 01 May 2020 04:31 )  pH, Arterial: 7.37  pH, Blood: x     /  pCO2: 76    /  pO2: 52    / HCO3: 41    / Base Excess: 17.2  /  SaO2: 88        RECENT CULTURES:  04-30 @ 03:02 .Urine Clean Catch (Midstream)     No growth    04-28 @ 14:55 .Blood Blood Escherichia coli    Growth in aerobic and anaerobic bottles: Escherichia coli  Aerobic Bottle: 11:16 Hours to positivity  Anaerobic Bottle: 11:36 Hours to positivity    04-23 @ 17:20 .Blood Blood-Arterial     No growth at 5 days.    04-22 @ 03:40 .Sputum Sputum     Normal Respiratory Fina present    Moderate polymorphonuclear leukocytes per low power field  No Squamous epithelial cells per low power field  No organisms seen per oil power field    04-21 @ 15:44 .Blood Blood-Peripheral     No growth at 5 days.      All imaging and other data have been reviewed.  < from: Xray Chest 1 View- PORTABLE-Urgent (04.21.20 @ 20:24) >   EXAM:  XR CHEST PORTABLE URGENT 1V                        PROCEDURE DATE:  04/21/2020    INTERPRETATION:  History: COVID pneumonia and hypoxia  Portable radiograph of the chest is performed. comparison is made to 4/14/2020  ET tube NG tube and right IJ line are again noted. Patchy mid to lower lung infiltrates are mildly improved on the left. There is no pleural effusion. A right midline is again seen.  Impression: Mild improvement in left-sided infiltrate.  DISCRETE X-RAY DATA:  Percent of LEFT lung opacification: 1-33%  Percent of RIGHT lung opacification: 1-33%  Change in lung opacification from most recent x-ray (<=3 days): No Prior  Change from prior dated 3 or more days (same admission): Decrease    Assessment and Plan:   43 y/o woman with morbid obesity, Hypothyroidism and Depression was admitted on 4/8 with SOB, cough and congestion. She has a positive COVID19 test.   She got worse with respiratory failure and intubated on 4/14.  She has extensive bilateral airspace opacities may represent multifocal pneumonia.  ID has been called for possible bacterial superinfection due to fever, leukocytosis and increased PCT.   I doubt that she has bacterial infection, CRP is going down, passed the interleukin storm.    COVID 19 + infection  Viral pneumonia  Acute hypoxic respiratory failure  Transaminitis     - COVID 19 PCR positive   - Blood culture 4/7 and 4/23 NGTD  - Blood culture from 4/28 with Ecoli pansensitive   - Repeat blood culture pending   - Sputum negative 4/22  - Airborne and contact isolation   - CXR with multifocal opacities mild clearing in left side.   - Procalcitonin 0.1-->0.27-->0.23  - CRP=8.19-->1.71-->4.89-->11.97  - Completed Hydroxychloroquine   - S/P steroid  - Trend WBC 27K-->19k-->14k--> 17k--14K  - S/P one dose of Actemra 400mg on 4/14  - Continue ceftriaxone 2mg daily.   - S/P convalescent plasma infusion last night.   - Very poor prognosis    Will follow.

## 2020-05-01 NOTE — PROGRESS NOTE ADULT - SUBJECTIVE AND OBJECTIVE BOX
OVERNIGHT EVENTS: hypoxic despite maximal ventilator support     Present Symptoms:     Dyspnea: vented   Nausea/Vomiting: unable   Anxiety:  unable   Depression: unable  Fatigue: unable   Loss of appetite: unable     Pain: none             Character-            Duration-            Effect-            Factors-            Frequency-            Location-            Severity-    Review of Systems: Reviewed                  Unable to obtain due to poor mentation     MEDICATIONS  (STANDING):  artificial  tears Solution 1 Drop(s) Both EYES two times a day  cefTRIAXone   IVPB 2000 milliGRAM(s) IV Intermittent every 24 hours  chlorhexidine 0.12% Liquid 15 milliLiter(s) Oral Mucosa every 12 hours  chlorhexidine 2% Cloths 1 Application(s) Topical daily  dextrose 5%. 1000 milliLiter(s) (50 mL/Hr) IV Continuous <Continuous>  dextrose 50% Injectable 12.5 Gram(s) IV Push once  dextrose 50% Injectable 25 Gram(s) IV Push once  dextrose 50% Injectable 25 Gram(s) IV Push once  enoxaparin Injectable 80 milliGRAM(s) SubCutaneous every 12 hours  HYDROmorphone Infusion. 0.5 mG/Hr (0.5 mL/Hr) IV Continuous <Continuous>  insulin lispro (HumaLOG) corrective regimen sliding scale   SubCutaneous every 6 hours  levothyroxine Injectable 100 MICROGram(s) IV Push at bedtime  midazolam Infusion 0.02 mG/kG/Hr (1.7 mL/Hr) IV Continuous <Continuous>  pantoprazole  Injectable 40 milliGRAM(s) IV Push daily  PARoxetine 10 milliGRAM(s) Oral daily    MEDICATIONS  (PRN):  acetaminophen   Tablet .. 650 milliGRAM(s) Oral every 4 hours PRN Temp greater or equal to 38.5C (101.3F)  dextrose 40% Gel 15 Gram(s) Oral once PRN Blood Glucose LESS THAN 70 milliGRAM(s)/deciLiter  glucagon  Injectable 1 milliGRAM(s) IntraMuscular once PRN Glucose <70 milliGRAM(s)/deciLiter    PHYSICAL EXAM:    Vital Signs Last 24 Hrs  T(C): 39.7 (01 May 2020 12:20), Max: 39.7 (01 May 2020 12:20)  T(F): 103.4 (01 May 2020 12:20), Max: 103.4 (01 May 2020 12:20)  HR: 147 (01 May 2020 12:00) (108 - 147)  BP: --  BP(mean): --  RR: 36 (01 May 2020 12:00) (34 - 40)  SpO2: 67% (01 May 2020 12:00) (66% - 93%)    General: intubated     Karnofsky:  20 %    HEENT: ET tube    Lungs: comfortable     CV: normal      GI: OG tube     : bg    MSK: weakness      Skin: no rash    LABS:                      7.6    15.52 )-----------( 381      ( 01 May 2020 04:24 )             26.2     05-01    146<H>  |  99  |  22.0<H>  ----------------------------<  119<H>  4.0   |  40.0<H>  |  0.52    Ca    8.0<L>      01 May 2020 04:24  Phos  2.2     05-01  Mg     2.4     05-01    TPro  6.1<L>  /  Alb  2.9<L>  /  TBili  0.5  /  DBili  x   /  AST  53<H>  /  ALT  34<H>  /  AlkPhos  98  05-01    I&O's Summary    30 Apr 2020 07:01  -  01 May 2020 07:00  --------------------------------------------------------  IN: 2588.6 mL / OUT: 1115 mL / NET: 1473.6 mL    01 May 2020 07:01  -  01 May 2020 14:19  --------------------------------------------------------  IN: 576 mL / OUT: 1800 mL / NET: -1224 mL    RADIOLOGY & ADDITIONAL STUDIES:    ADVANCE DIRECTIVES: DNR

## 2020-05-01 NOTE — GOALS OF CARE CONVERSATION - ADVANCED CARE PLANNING - CONVERSATION DETAILS
very lengthy conversation with patient's 3 siblings by conference call using  Aruna with Nishant FRANCO. We discussed the poor prognosis given persistent hypoxia and ventilator dependent despite maximal settings and efforts. We discussed that patient has received all possible treatments for her COVID virus and unfortunately has not made any improvement and is unlikely to survive this hospitalization. Family is adamant about wanting their sister to receive Remdesevir treatment because the sister who lives in [facility] who is a nurse has seen it work with patients there and they heard it on the news that it works. We explained that she is not a candidate for multiple reasons - she has received plaquenil therapy and those who receive this are excluded from remdesevir; additionally she received plasma last night, and we do not currently have remdesevir available here. On top of those reasons, she has suffered significant lung damage and it is unlikely that at this stage that it will help her. They kept asking to give it to her and we explained on 4-5 separate occasions the reasons why she would not be able to receive it. We offered that they come see her or facetime in order to be able to say their goodbyes. Will arrange for facetime with RN staff. They kept stating, " please don't disconnect her from the machines." We explained that we are not discussing that at this time but we also want them to be prepared that she may die even without disconnecting her form the machines because her oxygen levels today are very low despite maximal ventilator support.    Due to patient's health status and restrictions on visitations during this Public health emergency, advanced care planning discussion was performed via telephone with patient's 3 siblings.   36 minutes spent.
Spoke with sister Tanika using pacific interpreters. Explained the poor overall prognosis given prolonged ventilation, and very high ventilator settings. I explained that it is likely that the virus has caused all these changes in the lungs which is making it difficult to breathe on her own. I explained that if we do not start seeing some improvement in her ventilator requirements, then we may need to start havign difficult conversation about how long we maintain her on a machine that is unlikely bringing her back to a place of meaningful recovery. I also explained that she has received maximal treatment for the virus and other medical conditions ( blood clot)  while being here and despite all that, she has not improved. I discussed that given her critical illness and ventilator requirements, there is always a possibility that she could suffer a cardiac arrest and at that time, it is unlikely that CPR will be therapeutic. Sister Tanika very tearful but understands. I discussed that we are not talking about removal from the machines right now, but it is something we will discuss further if she continues to stay the same without improvement. She requested I call her brother Yvon and sister in [facility]. I then called them as well and explained the same. They had questions about plasma treatment and I expressed that we can look into it but it is unlikely that will help her right now given the length of time she has been with symptoms.  They are hoping and praying for a miracle and just want to make sure we don't remove her from the ventilator. I explained we are not doing that right now, but again if things don't improve, we may need to consider that to prevent further suffering is it not helping her. Ongoing support.     Due to patient's health status and restrictions on visitations during this Public health emergency, advanced care planning discussion was performed via telephone with patient's sister Tanika, brother Yvon and other sister in [facility]. 30 minutes spent.

## 2020-05-01 NOTE — PROGRESS NOTE ADULT - SUBJECTIVE AND OBJECTIVE BOX
On Admission  04-08-20 (23d)  HPI:  A 45 y/o Female with PMHx significant for Hypothyroidism, Depression, presents today c/o SOB, nasal congestion, cough with clear sputum production, muscle aches and chills. Pt reports her symptoms started 3 days ago, she tried cough medicine but symptoms failed to improved. Also endorses fatigue, SOB and chest tightness that worsened today.   Patient denies any sick contact or recent travel history.   At the ED noted to desat to low 84' on RA, improved to 95% on 5-6L NC, Temp: 102.2; CXR:  Extensive bilateral airspace opacities may represent multifocal pneumonia (08 Apr 2020 02:47)    PAST MEDICAL & SURGICAL HISTORY:  Depression  Hypothyroidism      Antimicrobial:  cefTRIAXone   IVPB 2000 milliGRAM(s) IV Intermittent every 24 hours    Cardiovascular:  midodrine 15 milliGRAM(s) Oral every 8 hours    Pulmonary:    Hematalogic:  enoxaparin Injectable 80 milliGRAM(s) SubCutaneous every 12 hours    Other:  acetaminophen   Tablet .. 650 milliGRAM(s) Oral every 4 hours PRN  artificial  tears Solution 1 Drop(s) Both EYES two times a day  chlorhexidine 0.12% Liquid 15 milliLiter(s) Oral Mucosa every 12 hours  chlorhexidine 2% Cloths 1 Application(s) Topical daily  dextrose 40% Gel 15 Gram(s) Oral once PRN  dextrose 5%. 1000 milliLiter(s) IV Continuous <Continuous>  dextrose 5%. 1000 milliLiter(s) IV Continuous <Continuous>  dextrose 50% Injectable 12.5 Gram(s) IV Push once  dextrose 50% Injectable 25 Gram(s) IV Push once  dextrose 50% Injectable 25 Gram(s) IV Push once  glucagon  Injectable 1 milliGRAM(s) IntraMuscular once PRN  HYDROmorphone Infusion. 0.5 mG/Hr IV Continuous <Continuous>  insulin lispro (HumaLOG) corrective regimen sliding scale   SubCutaneous every 6 hours  levothyroxine Injectable 100 MICROGram(s) IV Push at bedtime  midazolam Infusion 0.02 mG/kG/Hr IV Continuous <Continuous>  pantoprazole  Injectable 40 milliGRAM(s) IV Push daily  PARoxetine 10 milliGRAM(s) Oral daily      Drug Dosing Weight  Height (cm): 156 (07 Apr 2020 16:53)  Weight (kg): 85 (07 Apr 2020 16:53)  BMI (kg/m2): 34.9 (07 Apr 2020 16:53)  BSA (m2): 1.85 (07 Apr 2020 16:53)    T(C): 38.4 (05-01-20 @ 02:00), Max: 39.3 (04-30-20 @ 11:02)  HR: 111 (05-01-20 @ 02:00)  BP: --  BP(mean): --  ABP: 109/67 (05-01-20 @ 02:00)  ABP(mean): 81 (05-01-20 @ 02:00)  RR: 34 (05-01-20 @ 02:00)  SpO2: 84% (05-01-20 @ 02:00)    ABG - ( 30 Apr 2020 16:10 )  pH, Arterial: 7.41  pH, Blood: x     /  pCO2: 69    /  pO2: 66    / HCO3: 41    / Base Excess: 16.9  /  SaO2: 94                    04-29 @ 07:01  -  04-30 @ 07:00  --------------------------------------------------------  IN: 618.8 mL / OUT: 1325 mL / NET: -706.2 mL        Mode: AC/ CMV (Assist Control/ Continuous Mandatory Ventilation)  RR (machine): 34  TV (machine): 450  FiO2: 100  PEEP: 12  MAP: 20  PIP: 45        LABS:  CBC Full  -  ( 30 Apr 2020 05:06 )  WBC Count : 13.64 K/uL  RBC Count : 2.72 M/uL  Hemoglobin : 7.9 g/dL  Hematocrit : 27.8 %  Platelet Count - Automated : 345 K/uL  Mean Cell Volume : 102.2 fl  Mean Cell Hemoglobin : 29.0 pg  Mean Cell Hemoglobin Concentration : 28.4 gm/dL  Auto Neutrophil # : x  Auto Lymphocyte # : x  Auto Monocyte # : x  Auto Eosinophil # : x  Auto Basophil # : x  Auto Neutrophil % : x  Auto Lymphocyte % : x  Auto Monocyte % : x  Auto Eosinophil % : x  Auto Basophil % : x    04-30    150<H>  |  103  |  20.0  ----------------------------<  84  4.6   |  37.0<H>  |  0.50    Ca    8.4<L>      30 Apr 2020 05:06  Phos  2.0     04-30  Mg     2.4     04-30    TPro  6.3<L>  /  Alb  3.1<L>  /  TBili  0.4  /  DBili  x   /  AST  34<H>  /  ALT  54<H>  /  AlkPhos  130<H>  04-29        Culture Results:   No growth (04-30 @ 03:02)  Culture Results:   Growth in aerobic and anaerobic bottles: Escherichia coli  Aerobic Bottle: 11:16 Hours to positivity  Anaerobic Bottle: 11:36 Hours to positivity  TYPE: (C=Critical, N=Notification, A=Abnormal) C  TESTS:  _ GS  DATE/TIME CALLED: _ 04/29/2020 10:21:29  CALLED TO: Curt Smith RN  READ BACK (2 Patient Identifiers)(Y/N): _ Y  READ BACK VALUES (Y/N): _ Y  CALLED BY: _ [email or web address] (04-28 @ 14:55)  Culture Results:   Growth in aerobic and anaerobic bottles: Escherichia coli  Aerobic Bottle: 9:55 Hours to positivity  Anaerobic Bottle: 12:46 Hours to positivity  ***Blood Panel PCR results on this specimen are available  approximately 3 hours after the Gram stain result.***  Gram stain, PCR, and/or culture results may not always  correspond due to difference in methodologies.  ************************************************************  This PCR assay was performed using [employer].  The following targets are tested for: Enterococcus,  vancomycin resistant enterococci, Listeria monocytogenes,  coagulase negative staphylococci, S. aureus,  methicillin resistant S. aureus, Streptococcus agalactiae  (Group B), S. pneumoniae, S. pyogenes (Group A),  Acinetobacter baumannii, Enterobacter cloacae, E. coli,  Klebsiella oxytoca, K. pneumoniae, Proteus sp.,  Serratia marcescens, Haemophilus influenzae,  Neisseria meningitidis, Pseudomonas aeruginosa, Candida  albicans, C. glabrata, C krusei, C parapsilosis,  C. tropicalis and the KPC resistance gene.  TYPE: (C=Critical, N=Notification, A=Abnormal) C  TESTS:  _ GS  DATE/TIME CALLED: _ 04/29/2020 10:20:23  CALLED TO: _ Lesly Smith RN  READ BACK (2 Patient Identifiers)(Y/N): _ Y  READ BACK VALUES (Y/N):_ Y  CALLED BY: Curt brewer (04-28 @ 14:55)    ____________________________________________________________________________________________________

## 2020-05-01 NOTE — DISCHARGE NOTE FOR THE EXPIRED PATIENT - HOSPITAL COURSE
45 y/o female with pmhx of morbid obesity, hypothyroidism, depression admitted on 4/8 with hypoxic respiratory failure due to COVID-19 with hospital course complicated by prolonged ventilator dependent refractory to plaquenil, steroids, proning, paralytics, actemra, and convalescent plasma. Further complicated by LE DVT and subsequent HIT.     Today she remained hypoxic to 60% while on full vent support with high oxygenation requirements. Multiple conversations with family regarding her poor prognosis. At 16:55 patient entered in to a PEA arrest. She is DNR, and ACLS would be medically futile at this point due to her clinical scenario described above with increased risk to healthcare workers. At 17:01 patient was pronounced dead based on cardiopulmonary criteria.    Notified patients brother Yvon and her sister. They were reassured again (along with an additional phone call about an hour prior to patient's death) that the ventilator was not going to be unplugged or disconnected from the patient. She passed away while on ventilator support, as explained to them thoroughly.

## 2020-05-01 NOTE — PROGRESS NOTE ADULT - REASON FOR ADMISSION
SOB

## 2020-05-01 NOTE — PROGRESS NOTE ADULT - SUBJECTIVE AND OBJECTIVE BOX
Patient is a 44y old  Female who presents with a chief complaint of SOB (01 May 2020 03:56)      BRIEF HOSPITAL COURSE: 44y Female  ***    Events last 24 hours: ***    PAST MEDICAL & SURGICAL HISTORY:  Depression  Hypothyroidism        Hosp day #23d    Vent day #Mode: AC/ CMV (Assist Control/ Continuous Mandatory Ventilation), RR (machine): 34, TV (machine): 450, FiO2: 100, PEEP: 12, MAP: 21, PIP: 46  Mode: AC/ CMV (Assist Control/ Continuous Mandatory Ventilation)  RR (machine): 34  TV (machine): 450  FiO2: 100  PEEP: 12  MAP: 21  PIP: 46        Vital signs / Reviewed and Physical Exam Performed where pertinent and urgently required      T(C): 38.3 (05-01-20 @ 04:00), Max: 39.3 (04-30-20 @ 11:02)  HR: 112 (05-01-20 @ 09:46) (108 - 120)  BP: --  RR: 34 (05-01-20 @ 08:00) (34 - 40)  SpO2: 80% (05-01-20 @ 09:46) (80% - 96%)  04-30-20 @ 07:01  -  05-01-20 @ 07:00  --------------------------------------------------------  IN: 2538.6 mL / OUT: 1115 mL / NET: 1423.6 mL    05-01-20 @ 07:01  -  05-01-20 @ 10:43  --------------------------------------------------------  IN: 0 mL / OUT: 1450 mL / NET: -1450 mL      Lab / Radiology  studies / ABG / Meds -  reviewed and interpreted into the assessment and treatment plan.  acetaminophen   Tablet .. 650 milliGRAM(s) Oral every 4 hours PRN  artificial  tears Solution 1 Drop(s) Both EYES two times a day  cefTRIAXone   IVPB 2000 milliGRAM(s) IV Intermittent every 24 hours  chlorhexidine 0.12% Liquid 15 milliLiter(s) Oral Mucosa every 12 hours  chlorhexidine 2% Cloths 1 Application(s) Topical daily  dextrose 40% Gel 15 Gram(s) Oral once PRN  dextrose 5%. 1000 milliLiter(s) IV Continuous <Continuous>  dextrose 50% Injectable 12.5 Gram(s) IV Push once  dextrose 50% Injectable 25 Gram(s) IV Push once  dextrose 50% Injectable 25 Gram(s) IV Push once  enoxaparin Injectable 80 milliGRAM(s) SubCutaneous every 12 hours  glucagon  Injectable 1 milliGRAM(s) IntraMuscular once PRN  HYDROmorphone Infusion. 0.5 mG/Hr IV Continuous <Continuous>  insulin lispro (HumaLOG) corrective regimen sliding scale   SubCutaneous every 6 hours  levothyroxine Injectable 100 MICROGram(s) IV Push at bedtime  midazolam Infusion 0.02 mG/kG/Hr IV Continuous <Continuous>  pantoprazole  Injectable 40 milliGRAM(s) IV Push daily  PARoxetine 10 milliGRAM(s) Oral daily      Assessment/Plan/Therapeutic interventions  Problems:  1) Acute Hypoxic respiratory failure related to COVID-19 infection, Viral PNA with associated ARDS  2) Acute renal failure   3) ICU Delirium     Neuro - Sedation neuromuscular blockade to facilitate safe ventilation/ Current Sedation strategy includes***    CV -  Pressor support as needed to maintain MAP 65           Avoiding fluid challenges          QTC monitoring while on Azithromycin and Hydroxychloroquine.    Pulm -  ARDS-NET 4-6cc/kg IBW TV as able to maintain plateau pressures <30               Prone ventilation consideration as feasible  Pa02/Fi02 < 150 on Fi02 >60% and PEEP at least 5                Current FIO2/PEEP requirement is***               Steroids added for enhanced antiinflammatory effect                Vent bundle Reviewed     GI -  PPI  Enteric feeds as tolerated in tandem with NMB and prone ventilation    Renal - Even to negative fluid balance as tolerated by hemodynamics and renal fx.  Feeds to be provided in lieu of IVF.                **** Currently On/Off Diuretics    Heme -  Pharmacologic DVT PPx  in addition to SCD's, Full dose being administered given enhanced  prothrombotic nature of COVID infection    ID - ABX discontinuation based on discussion with ID in conjunction with clinical features, culture data, and judicious procalcitonin monitoring.   ***Remains on Plaquenil or Completed course of Plaquenil    Endo -  Aggressive glycemic control to limit FS glucose to < 180mg/dl. ***Currenlty requiring Insulin Drip         COVID 19 specific considerations and therapeutic  options based on the available and rapidly changing literature    Goals of care considerations:  Ongoing assessment for patient specific treatment options based on progression or decline.  I have involved the family with updates and requests in guidance for medical decision making.          42  Minutes of critical care time spent in the management of this critically ill COVID-19 patient/PUI patient with continuous assessments and interventions based on the interpretation of multiple databases. Patient is a 44y old  Female who presents with a chief complaint of SOB (01 May 2020 03:56)      BRIEF HOSPITAL COURSE: 45 yo f pmhx depression and hypothryoidism admitted 4/8 with covid19 respiratory failure. Developed profound hypoxemia, and respiratory failure, requiring intubation, paralysis and was proned.   Completed Plaquenil and has received Actemra    Events last 24 hours: She continuers to decline in spite of all efforts. Received Plasma yesterday with no improvements Remains on 100% FIO2 and +12 PEEP. O2 sats in to 70s. No further Prone/Paralysis at this point, now on day 16 on ventilator. S/P Actemra and Plaquenil      PAST MEDICAL & SURGICAL HISTORY:  Depression  Hypothyroidism        Hosp day #23d    Vent day #16 Mode: AC/ CMV (Assist Control/ Continuous Mandatory Ventilation), RR (machine): 34, TV (machine): 450, FiO2: 100, PEEP: 12, MAP: 21, PIP: 46  Mode: AC/ CMV (Assist Control/ Continuous Mandatory Ventilation)  RR (machine): 34  TV (machine): 450  FiO2: 100  PEEP: 12  MAP: 21  PIP: 46        Vital signs / Reviewed and Physical Exam Performed where pertinent and urgently required      T(C): 38.3 (05-01-20 @ 04:00), Max: 39.3 (04-30-20 @ 11:02)  HR: 112 (05-01-20 @ 09:46) (108 - 120)  BP: --  RR: 34 (05-01-20 @ 08:00) (34 - 40)  SpO2: 80% (05-01-20 @ 09:46) (80% - 96%)  04-30-20 @ 07:01  -  05-01-20 @ 07:00  --------------------------------------------------------  IN: 2538.6 mL / OUT: 1115 mL / NET: 1423.6 mL    05-01-20 @ 07:01  -  05-01-20 @ 10:43  --------------------------------------------------------  IN: 0 mL / OUT: 1450 mL / NET: -1450 mL      Lab / Radiology  studies / ABG / Meds -  reviewed and interpreted into the assessment and treatment plan.  acetaminophen   Tablet .. 650 milliGRAM(s) Oral every 4 hours PRN  artificial  tears Solution 1 Drop(s) Both EYES two times a day  cefTRIAXone   IVPB 2000 milliGRAM(s) IV Intermittent every 24 hours  chlorhexidine 0.12% Liquid 15 milliLiter(s) Oral Mucosa every 12 hours  chlorhexidine 2% Cloths 1 Application(s) Topical daily  dextrose 40% Gel 15 Gram(s) Oral once PRN  dextrose 5%. 1000 milliLiter(s) IV Continuous <Continuous>  dextrose 50% Injectable 12.5 Gram(s) IV Push once  dextrose 50% Injectable 25 Gram(s) IV Push once  dextrose 50% Injectable 25 Gram(s) IV Push once  enoxaparin Injectable 80 milliGRAM(s) SubCutaneous every 12 hours  glucagon  Injectable 1 milliGRAM(s) IntraMuscular once PRN  HYDROmorphone Infusion. 0.5 mG/Hr IV Continuous <Continuous>  insulin lispro (HumaLOG) corrective regimen sliding scale   SubCutaneous every 6 hours  levothyroxine Injectable 100 MICROGram(s) IV Push at bedtime  midazolam Infusion 0.02 mG/kG/Hr IV Continuous <Continuous>  pantoprazole  Injectable 40 milliGRAM(s) IV Push daily  Assessment/Plan/Therapeutic interventions  Problems:  1) Acute Hypoxic respiratory failure related to COVID-19 infection, Viral PNA with associated ARDS    Neuro - Sedation neuromuscular blockade to facilitate safe ventilation/ Current Sedation strategy includes Dilaudid and Versed infusions in conjunction with NMB     CV -   Currently off pressors with no escalation            Avoiding fluid challenges          Pulm -  ARDS-NET 4-6cc/kg IBW TV as able to maintain plateau pressures <30                Current FIO2/PEEP requirement is 100/+12 Pplat = 35 , No improvement in spite of Plaquenil, Steroids, Actemra, Convalescent plasma                 GI -  PPI  Enteric feeds as tolerated     Renal - Even to negative fluid balance as tolerated by hemodynamics and renal fx.                 Currently Off Diuretics    Heme -  Pharmacologic DVT PPx  in addition to SCD's, Full dose being administered given enhanced  prothrombotic nature of COVID infection    ID - ABX discontinuation based on discussion with ID in conjunction with clinical features, culture data, and judicious procalcitonin monitoring.    Completed course of Plaquenil    Endo -  Aggressive glycemic control to limit FS glucose to < 180mg/dl.         COVID 19 specific considerations and therapeutic  options based on the available and rapidly changing literature    Goals of care considerations:  Ongoing assessment for patient specific treatment options based on progression or decline.  I have involved the family with updates and requests in guidance for medical decision making.  I contacted the patients sister Tanika and brother Yvon, and another sister from [facility]. This was done with Dr Sher from palliative care all via a hospital . We collectively explained tat in psite off all medical intervention, the patient has not responded in the past 3 weeks. We further explained that she is continuing to decline and that the ventilator at thsi point is artificially keeping her alive and prolonging the dying process for her. The family remains unrealistic in their expectations They have requested Remdezivir, which we have explained she is not a candidate for.      We have further explained that, in spite of the ventilator remaining in place, she is not going to survive. We have offered facetime visiting for them to see her and to allow them to say good-bye, before she passes. We have also told them that we will be giving her medication to keep her comfortable,. The MOLST is in place  and the DNR remains in place.          60  Minutes of critical care time spent in the management of this critically ill COVID-19 patient with continuous assessments and interventions based on the interpretation of multiple databases.

## 2020-05-01 NOTE — PROGRESS NOTE ADULT - ATTENDING COMMENTS
CC time spent titrating vent settings, sedatives, adjusting anticoagulation strategy and other therapies in this critically ill patient
Thank you for the opportunity to assist with the care of this patient.   [facility] [phone].
Thank you for the opportunity to assist with the care of this patient.   [facility] [phone].
Thank you for the opportunity to assist with the care of this patient.   [facility] [phone].
Thank you for the opportunity to assist with the care of this patient.   [facility] [phone].
Patient was seen and examined at bedside with .   Feels OK. Still on NRB.  Lungs clear on exam.  Continue current treatment.  Encourage for prone position.  Wean down from NRB as tolerated.

## 2020-05-01 NOTE — PROGRESS NOTE ADULT - ASSESSMENT
Exam:  intubated and sedated  reg rhythm  bilat air entry  abd distended  bilat edema    Impression/Plan  43 yo female with hypothyroidism, depression, admitted 4/8 with covid19 viral pneumonia, acute respiratory failure, intubated 4/14, now with ARDS, E coli bacteremia, left calf DVT.  Prognosis poor.    Covid19 viral pneumonia  - low Vt ventilation, volume AC 34/450/100%/peep 12  - on trial on Lisa  Despite this support, spo2 is in mid 80s    Hypernatremia  - D5W at 100 ml/hr  - free water via NG tube    DVT  - Full dose lovenox    E coli bacteremia  - ceftriaxone per ID    Diet: tube feeds  GI prophylaxis: protonix

## 2020-05-01 NOTE — CHART NOTE - NSCHARTNOTEFT_GEN_A_CORE
Source: Patient [ ]  Family [ ]   other [x ] EMR     Enteral /Parenteral Nutrition:   Diet, NPO with Tube Feed:   Tube Feeding Modality: Orogastric  Glucerna 1.5 Juan  Total Volume for 24 Hours (mL): 720  Continuous  Starting Tube Feed Rate {mL per Hour}: 30  Until Goal Tube Feed Rate (mL per Hour): 30  Tube Feed Duration (in Hours): 24  Tube Feed Start Time: 07:00 (04-22-20 @ 16:56)    Current Weight:   4/7: 85kg    % Weight Change (N/A) No new wt to assess at this time.     Pertinent Medications: MEDICATIONS  (STANDING):  artificial  tears Solution 1 Drop(s) Both EYES two times a day  cefTRIAXone   IVPB 2000 milliGRAM(s) IV Intermittent every 24 hours  chlorhexidine 0.12% Liquid 15 milliLiter(s) Oral Mucosa every 12 hours  chlorhexidine 2% Cloths 1 Application(s) Topical daily  dextrose 5%. 1000 milliLiter(s) (50 mL/Hr) IV Continuous <Continuous>  dextrose 50% Injectable 12.5 Gram(s) IV Push once  dextrose 50% Injectable 25 Gram(s) IV Push once  dextrose 50% Injectable 25 Gram(s) IV Push once  enoxaparin Injectable 80 milliGRAM(s) SubCutaneous every 12 hours  HYDROmorphone Infusion. 0.5 mG/Hr (0.5 mL/Hr) IV Continuous <Continuous>  insulin lispro (HumaLOG) corrective regimen sliding scale   SubCutaneous every 6 hours  levothyroxine Injectable 100 MICROGram(s) IV Push at bedtime  metolazone 5 milliGRAM(s) Oral once  midazolam Infusion 0.02 mG/kG/Hr (1.7 mL/Hr) IV Continuous <Continuous>  pantoprazole  Injectable 40 milliGRAM(s) IV Push daily  PARoxetine 10 milliGRAM(s) Oral daily    MEDICATIONS  (PRN):  acetaminophen   Tablet .. 650 milliGRAM(s) Oral every 4 hours PRN Temp greater or equal to 38.5C (101.3F)  dextrose 40% Gel 15 Gram(s) Oral once PRN Blood Glucose LESS THAN 70 milliGRAM(s)/deciLiter  glucagon  Injectable 1 milliGRAM(s) IntraMuscular once PRN Glucose <70 milliGRAM(s)/deciLiter    Pertinent Labs: CBC Full  -  ( 01 May 2020 04:24 )  WBC Count : 15.52 K/uL  RBC Count : 2.62 M/uL  Hemoglobin : 7.6 g/dL  Hematocrit : 26.2 %  Platelet Count - Automated : 381 K/uL  Mean Cell Volume : 100.0 fl  Mean Cell Hemoglobin : 29.0 pg  Mean Cell Hemoglobin Concentration : 29.0 gm/dL  Auto Neutrophil # : 11.22 K/uL  Auto Lymphocyte # : 1.43 K/uL  Auto Monocyte # : 1.07 K/uL  Auto Eosinophil # : 1.01 K/uL  Auto Basophil # : 0.09 K/uL  Auto Neutrophil % : 72.3 %  Auto Lymphocyte % : 9.2 %  Auto Monocyte % : 6.9 %  Auto Eosinophil % : 6.5 %  Auto Basophil % : 0.6 %        Skin: B/L Facial cheeks unstageable, L heel suspected DTI (2+ generalized edema noted)       Estimated Needs:   [x ] no change since previous assessment  [ ] recalculated:     Current Nutrition Diagnosis:  Pt remains at high nutrition risk secondary to severe acute protein calorie malnutrition in the setting of increased needs with COVID+ infection,  related to inability to meet sufficient protein energy requirements during NPO status with no feeding at all or feeding not at goal due to respiratory limitations, and healing (skin breakdown) as evidenced by meeting less then <50% estimated energy requirements for >10 days, + 2 generalized edema and new development of multiple pressure injuries. aware of palliative care involvement. Pt receiving enteral feeds of Glucerna 1.5 @ 30ml/hr (x 20 hours) 600ml/day; 900kcal, 50gm protein; not yet meeting estimated energy requirements. Recommendations below;     Recommendations:   1. RX: MVI, Thiamine and Vit. C daily   2. Obtain new wt as feasible.   3. Consider changing enteral formula to Picot @ 50ml/hr (x20 hours) 1000ml/day; 1500kcla, 94gm protein to better meet pt's estimated energy requirements and aid in wound healing.   4. Continue with free water 350ml every 4 hours.   5. Monitor tolerance closely     Monitoring and Evaluation:   [ ] PO intake [x ] Tolerance to diet prescription [X] Weights  [X] Follow up per protocol [X] Labs:

## 2020-05-05 LAB
CULTURE RESULTS: SIGNIFICANT CHANGE UP
SPECIMEN SOURCE: SIGNIFICANT CHANGE UP
